# Patient Record
Sex: MALE | Race: WHITE
[De-identification: names, ages, dates, MRNs, and addresses within clinical notes are randomized per-mention and may not be internally consistent; named-entity substitution may affect disease eponyms.]

---

## 2020-09-02 ENCOUNTER — HOSPITAL ENCOUNTER (INPATIENT)
Dept: HOSPITAL 94 - ER | Age: 60
LOS: 19 days | Discharge: HOME | DRG: 230 | End: 2020-09-21
Attending: FAMILY MEDICINE | Admitting: FAMILY MEDICINE
Payer: MEDICAID

## 2020-09-02 VITALS — SYSTOLIC BLOOD PRESSURE: 143 MMHG | DIASTOLIC BLOOD PRESSURE: 75 MMHG

## 2020-09-02 VITALS — DIASTOLIC BLOOD PRESSURE: 62 MMHG | SYSTOLIC BLOOD PRESSURE: 97 MMHG

## 2020-09-02 VITALS — WEIGHT: 146.61 LBS | HEIGHT: 67 IN | BODY MASS INDEX: 23.01 KG/M2

## 2020-09-02 DIAGNOSIS — E87.5: ICD-10-CM

## 2020-09-02 DIAGNOSIS — E87.0: ICD-10-CM

## 2020-09-02 DIAGNOSIS — Z79.82: ICD-10-CM

## 2020-09-02 DIAGNOSIS — J44.9: ICD-10-CM

## 2020-09-02 DIAGNOSIS — Z95.5: ICD-10-CM

## 2020-09-02 DIAGNOSIS — Z80.8: ICD-10-CM

## 2020-09-02 DIAGNOSIS — K56.609: ICD-10-CM

## 2020-09-02 DIAGNOSIS — E87.2: ICD-10-CM

## 2020-09-02 DIAGNOSIS — K91.840: ICD-10-CM

## 2020-09-02 DIAGNOSIS — I25.2: ICD-10-CM

## 2020-09-02 DIAGNOSIS — K56.7: ICD-10-CM
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
## 2020-09-02 DIAGNOSIS — R57.8: ICD-10-CM

## 2020-09-02 DIAGNOSIS — N17.9: ICD-10-CM

## 2020-09-02 DIAGNOSIS — Z79.02: ICD-10-CM

## 2020-09-02 DIAGNOSIS — I82.623: ICD-10-CM

## 2020-09-02 DIAGNOSIS — I50.22: ICD-10-CM

## 2020-09-02 DIAGNOSIS — D63.8: ICD-10-CM

## 2020-09-02 DIAGNOSIS — E43: ICD-10-CM

## 2020-09-02 DIAGNOSIS — I13.0: ICD-10-CM

## 2020-09-02 DIAGNOSIS — J96.00: ICD-10-CM

## 2020-09-02 DIAGNOSIS — N18.9: ICD-10-CM

## 2020-09-02 DIAGNOSIS — K94.02: Primary | ICD-10-CM

## 2020-09-02 DIAGNOSIS — Z82.49: ICD-10-CM

## 2020-09-02 DIAGNOSIS — B37.9: ICD-10-CM

## 2020-09-02 DIAGNOSIS — E78.5: ICD-10-CM

## 2020-09-02 DIAGNOSIS — E83.52: ICD-10-CM

## 2020-09-02 DIAGNOSIS — Z79.899: ICD-10-CM

## 2020-09-02 DIAGNOSIS — E87.6: ICD-10-CM

## 2020-09-02 DIAGNOSIS — I25.10: ICD-10-CM
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
## 2020-09-02 DIAGNOSIS — E87.1: ICD-10-CM

## 2020-09-02 LAB
ALBUMIN SERPL BCP-MCNC: 4.2 G/DL (ref 3.4–5)
ALBUMIN/GLOB SERPL: 1 {RATIO} (ref 1.1–1.5)
ALP SERPL-CCNC: 121 IU/L (ref 46–116)
ALT SERPL W P-5'-P-CCNC: 40 U/L (ref 12–78)
ANION GAP SERPL CALCULATED.3IONS-SCNC: 9 MMOL/L (ref 8–16)
AST SERPL W P-5'-P-CCNC: 30 U/L (ref 10–37)
BASOPHILS # BLD AUTO: 0.1 X10'3 (ref 0–0.2)
BASOPHILS NFR BLD AUTO: 0.8 % (ref 0–1)
BILIRUB SERPL-MCNC: 0.3 MG/DL (ref 0.1–1)
BUN SERPL-MCNC: 60 MG/DL (ref 7–18)
BUN/CREAT SERPL: 30 (ref 5.4–32)
CALCIUM SERPL-MCNC: 10.8 MG/DL (ref 8.5–10.1)
CHLORIDE SERPL-SCNC: 91 MMOL/L (ref 99–107)
CO2 SERPL-SCNC: 23.1 MMOL/L (ref 24–32)
CREAT SERPL-MCNC: 2 MG/DL (ref 0.6–1.1)
EOSINOPHIL # BLD AUTO: 0.1 X10'3 (ref 0–0.9)
EOSINOPHIL NFR BLD AUTO: 1.5 % (ref 0–6)
ERYTHROCYTE [DISTWIDTH] IN BLOOD BY AUTOMATED COUNT: 17.1 % (ref 11.5–14.5)
GFR SERPL CREATININE-BSD FRML MDRD: 35 ML/MIN
GLUCOSE SERPL-MCNC: 113 MG/DL (ref 70–104)
HBA1C MFR BLD: 6.1 % (ref 4.5–6.2)
HCT VFR BLD AUTO: 33 % (ref 42–52)
HGB BLD-MCNC: 10.9 G/DL (ref 14–17.9)
LYMPHOCYTES # BLD AUTO: 2.1 X10'3 (ref 1.1–4.8)
LYMPHOCYTES NFR BLD AUTO: 22.4 % (ref 21–51)
MCH RBC QN AUTO: 29.2 PG (ref 27–31)
MCHC RBC AUTO-ENTMCNC: 33.1 G/DL (ref 33–36.5)
MCV RBC AUTO: 88.3 FL (ref 78–98)
MONOCYTES # BLD AUTO: 0.8 X10'3 (ref 0–0.9)
MONOCYTES NFR BLD AUTO: 8.5 % (ref 2–12)
NEUTROPHILS # BLD AUTO: 6.3 X10'3 (ref 1.8–7.7)
NEUTROPHILS NFR BLD AUTO: 66.8 % (ref 42–75)
PLATELET # BLD AUTO: 512 X10'3 (ref 140–440)
PMV BLD AUTO: 7 FL (ref 7.4–10.4)
POTASSIUM SERPL-SCNC: 5.2 MMOL/L (ref 3.5–5.1)
PROT SERPL-MCNC: 8.4 G/DL (ref 6.4–8.2)
RBC # BLD AUTO: 3.74 X10'6 (ref 4.7–6.1)
SODIUM SERPL-SCNC: 123 MMOL/L (ref 135–145)
WBC # BLD AUTO: 9.4 X10'3 (ref 4.5–11)

## 2020-09-02 PROCEDURE — 97110 THERAPEUTIC EXERCISES: CPT

## 2020-09-02 PROCEDURE — 87081 CULTURE SCREEN ONLY: CPT

## 2020-09-02 PROCEDURE — 94667 MNPJ CHEST WALL 1ST: CPT

## 2020-09-02 PROCEDURE — 97161 PT EVAL LOW COMPLEX 20 MIN: CPT

## 2020-09-02 PROCEDURE — 86900 BLOOD TYPING SEROLOGIC ABO: CPT

## 2020-09-02 PROCEDURE — 83036 HEMOGLOBIN GLYCOSYLATED A1C: CPT

## 2020-09-02 PROCEDURE — 97164 PT RE-EVAL EST PLAN CARE: CPT

## 2020-09-02 PROCEDURE — 36600 WITHDRAWAL OF ARTERIAL BLOOD: CPT

## 2020-09-02 PROCEDURE — 97116 GAIT TRAINING THERAPY: CPT

## 2020-09-02 PROCEDURE — 80061 LIPID PANEL: CPT

## 2020-09-02 PROCEDURE — 93971 EXTREMITY STUDY: CPT

## 2020-09-02 PROCEDURE — 86901 BLOOD TYPING SEROLOGIC RH(D): CPT

## 2020-09-02 PROCEDURE — 84100 ASSAY OF PHOSPHORUS: CPT

## 2020-09-02 PROCEDURE — 74270 X-RAY XM COLON 1CNTRST STD: CPT

## 2020-09-02 PROCEDURE — 86920 COMPATIBILITY TEST SPIN: CPT

## 2020-09-02 PROCEDURE — 36573 INSJ PICC RS&I 5 YR+: CPT

## 2020-09-02 PROCEDURE — 82948 REAGENT STRIP/BLOOD GLUCOSE: CPT

## 2020-09-02 PROCEDURE — 84478 ASSAY OF TRIGLYCERIDES: CPT

## 2020-09-02 PROCEDURE — 97530 THERAPEUTIC ACTIVITIES: CPT

## 2020-09-02 PROCEDURE — 85008 BL SMEAR W/O DIFF WBC COUNT: CPT

## 2020-09-02 PROCEDURE — 85018 HEMOGLOBIN: CPT

## 2020-09-02 PROCEDURE — 85027 COMPLETE CBC AUTOMATED: CPT

## 2020-09-02 PROCEDURE — 94002 VENT MGMT INPAT INIT DAY: CPT

## 2020-09-02 PROCEDURE — 86885 COOMBS TEST INDIRECT QUAL: CPT

## 2020-09-02 PROCEDURE — 82803 BLOOD GASES ANY COMBINATION: CPT

## 2020-09-02 PROCEDURE — 94003 VENT MGMT INPAT SUBQ DAY: CPT

## 2020-09-02 PROCEDURE — 76937 US GUIDE VASCULAR ACCESS: CPT

## 2020-09-02 PROCEDURE — 87040 BLOOD CULTURE FOR BACTERIA: CPT

## 2020-09-02 PROCEDURE — 94640 AIRWAY INHALATION TREATMENT: CPT

## 2020-09-02 PROCEDURE — 74248 X-RAY SM INT F-THRU STD: CPT

## 2020-09-02 PROCEDURE — 83735 ASSAY OF MAGNESIUM: CPT

## 2020-09-02 PROCEDURE — 80202 ASSAY OF VANCOMYCIN: CPT

## 2020-09-02 PROCEDURE — 84132 ASSAY OF SERUM POTASSIUM: CPT

## 2020-09-02 PROCEDURE — 85025 COMPLETE CBC W/AUTO DIFF WBC: CPT

## 2020-09-02 PROCEDURE — 99285 EMERGENCY DEPT VISIT HI MDM: CPT

## 2020-09-02 PROCEDURE — 36415 COLL VENOUS BLD VENIPUNCTURE: CPT

## 2020-09-02 PROCEDURE — 85610 PROTHROMBIN TIME: CPT

## 2020-09-02 PROCEDURE — 74176 CT ABD & PELVIS W/O CONTRAST: CPT

## 2020-09-02 PROCEDURE — 83605 ASSAY OF LACTIC ACID: CPT

## 2020-09-02 PROCEDURE — 84134 ASSAY OF PREALBUMIN: CPT

## 2020-09-02 PROCEDURE — 36430 TRANSFUSION BLD/BLD COMPNT: CPT

## 2020-09-02 PROCEDURE — 94760 N-INVAS EAR/PLS OXIMETRY 1: CPT

## 2020-09-02 PROCEDURE — 71045 X-RAY EXAM CHEST 1 VIEW: CPT

## 2020-09-02 PROCEDURE — 85730 THROMBOPLASTIN TIME PARTIAL: CPT

## 2020-09-02 PROCEDURE — 87070 CULTURE OTHR SPECIMN AEROBIC: CPT

## 2020-09-02 PROCEDURE — 74018 RADEX ABDOMEN 1 VIEW: CPT

## 2020-09-02 PROCEDURE — 97535 SELF CARE MNGMENT TRAINING: CPT

## 2020-09-02 PROCEDURE — 94668 MNPJ CHEST WALL SBSQ: CPT

## 2020-09-02 PROCEDURE — 80053 COMPREHEN METABOLIC PANEL: CPT

## 2020-09-02 PROCEDURE — 93005 ELECTROCARDIOGRAM TRACING: CPT

## 2020-09-02 RX ADMIN — SODIUM CHLORIDE SCH MLS/HR: 9 INJECTION INTRAMUSCULAR; INTRAVENOUS; SUBCUTANEOUS at 11:47

## 2020-09-02 RX ADMIN — TAZOBACTAM SODIUM AND PIPERACILLIN SODIUM SCH MLS/HR: 375; 3 INJECTION, SOLUTION INTRAVENOUS at 16:23

## 2020-09-02 RX ADMIN — NYSTATIN AND TRIAMCINOLONE ACETONIDE SCH APPLIC: 100000; 1 CREAM TOPICAL at 21:00

## 2020-09-02 RX ADMIN — NYSTATIN AND TRIAMCINOLONE ACETONIDE SCH APPLIC: 100000; 1 CREAM TOPICAL at 13:00

## 2020-09-02 RX ADMIN — TAZOBACTAM SODIUM AND PIPERACILLIN SODIUM SCH MLS/HR: 375; 3 INJECTION, SOLUTION INTRAVENOUS at 11:47

## 2020-09-02 RX ADMIN — VANCOMYCIN HYDROCHLORIDE SCH MLS/HR: 750 INJECTION, POWDER, LYOPHILIZED, FOR SOLUTION INTRAVENOUS at 16:23

## 2020-09-02 RX ADMIN — HEPARIN SODIUM SCH UNIT: 5000 INJECTION, SOLUTION INTRAVENOUS; SUBCUTANEOUS at 20:38

## 2020-09-02 RX ADMIN — NYSTATIN AND TRIAMCINOLONE ACETONIDE SCH APPLIC: 100000; 1 CREAM TOPICAL at 10:10

## 2020-09-02 RX ADMIN — SODIUM CHLORIDE SCH MLS/HR: 9 INJECTION INTRAMUSCULAR; INTRAVENOUS; SUBCUTANEOUS at 20:47

## 2020-09-02 NOTE — NUR
tried to call report to surgical, rn stated that room wasnt cleaned and they 
would call back, they said they could not take report until room was cleaned

## 2020-09-02 NOTE — NUR
PAGER ID:  [phone] 

MESSAGE:  Surgical Flr Ann RN ext [phone]. RE: Emmanuel Selby. Patient requesting Dilaudid 
IV for pain as Morphine don't work well for him. Wound care nurse at bedside asking if we 
can get Lidocaine topical for wound care

## 2020-09-02 NOTE — NUR
Patient in room HOOD 347. I have received report from  SUKI MUJICA  and had the opportunity to 
ask questions and assume patient care.

## 2020-09-03 VITALS — SYSTOLIC BLOOD PRESSURE: 105 MMHG | DIASTOLIC BLOOD PRESSURE: 65 MMHG

## 2020-09-03 VITALS — SYSTOLIC BLOOD PRESSURE: 111 MMHG | DIASTOLIC BLOOD PRESSURE: 72 MMHG

## 2020-09-03 VITALS — SYSTOLIC BLOOD PRESSURE: 118 MMHG | DIASTOLIC BLOOD PRESSURE: 65 MMHG

## 2020-09-03 VITALS — SYSTOLIC BLOOD PRESSURE: 110 MMHG | DIASTOLIC BLOOD PRESSURE: 74 MMHG

## 2020-09-03 VITALS — DIASTOLIC BLOOD PRESSURE: 62 MMHG | SYSTOLIC BLOOD PRESSURE: 105 MMHG

## 2020-09-03 LAB
ALBUMIN SERPL BCP-MCNC: 3.4 G/DL (ref 3.4–5)
ALBUMIN/GLOB SERPL: 0.9 {RATIO} (ref 1.1–1.5)
ALP SERPL-CCNC: 109 IU/L (ref 46–116)
ALT SERPL W P-5'-P-CCNC: 42 U/L (ref 12–78)
ANION GAP SERPL CALCULATED.3IONS-SCNC: 11 MMOL/L (ref 8–16)
ANISOCYTOSIS BLD QL SMEAR: (no result)
AST SERPL W P-5'-P-CCNC: 33 U/L (ref 10–37)
BASOPHILS # BLD AUTO: 0.1 X10'3 (ref 0–0.2)
BASOPHILS NFR BLD AUTO: 1 % (ref 0–1)
BILIRUB SERPL-MCNC: 0.4 MG/DL (ref 0.1–1)
BUN SERPL-MCNC: 46 MG/DL (ref 7–18)
BUN/CREAT SERPL: 28 (ref 5.4–32)
BURR CELLS BLD QL SMEAR: (no result)
CALCIUM SERPL-MCNC: 9.9 MG/DL (ref 8.5–10.1)
CHLORIDE SERPL-SCNC: 97 MMOL/L (ref 99–107)
CHOLEST SERPL-MCNC: 88 MG/DL (ref 0–200)
CHOLEST/HDLC SERPL: 2.5 {RATIO} (ref 0–4.99)
CO2 SERPL-SCNC: 21.1 MMOL/L (ref 24–32)
CREAT SERPL-MCNC: 1.64 MG/DL (ref 0.6–1.1)
EOSINOPHIL # BLD AUTO: 0.3 X10'3 (ref 0–0.9)
EOSINOPHIL NFR BLD AUTO: 4.4 % (ref 0–6)
ERYTHROCYTE [DISTWIDTH] IN BLOOD BY AUTOMATED COUNT: 17.7 % (ref 11.5–14.5)
GFR SERPL CREATININE-BSD FRML MDRD: 44 ML/MIN
GLUCOSE SERPL-MCNC: 92 MG/DL (ref 70–104)
HCT VFR BLD AUTO: 32.5 % (ref 42–52)
HDLC SERPL-MCNC: 35 MG/DL (ref 35–60)
HGB BLD-MCNC: 10.8 G/DL (ref 14–17.9)
LDLC SERPL DIRECT ASSAY-MCNC: 37 MG/DL (ref 50–100)
LYMPHOCYTES # BLD AUTO: 2.6 X10'3 (ref 1.1–4.8)
LYMPHOCYTES NFR BLD AUTO: 33.8 % (ref 21–51)
MAGNESIUM SERPL-MCNC: 1.6 MG/DL (ref 1.5–2.4)
MCH RBC QN AUTO: 30.2 PG (ref 27–31)
MCHC RBC AUTO-ENTMCNC: 33.4 G/DL (ref 33–36.5)
MCV RBC AUTO: 90.2 FL (ref 78–98)
MONOCYTES # BLD AUTO: 0.9 X10'3 (ref 0–0.9)
MONOCYTES NFR BLD AUTO: 11.4 % (ref 2–12)
NEUTROPHILS # BLD AUTO: 3.7 X10'3 (ref 1.8–7.7)
NEUTROPHILS NFR BLD AUTO: 49.4 % (ref 42–75)
OVALOCYTES BLD QL SMEAR: (no result)
PHOSPHATE SERPL-MCNC: 4.4 MG/DL (ref 2.3–4.5)
PLATELET # BLD AUTO: 420 X10'3 (ref 140–440)
PLATELET BLD QL SMEAR: NORMAL
PMV BLD AUTO: 7.1 FL (ref 7.4–10.4)
POIKILOCYTOSIS BLD QL SMEAR: (no result)
POTASSIUM SERPL-SCNC: 5 MMOL/L (ref 3.5–5.1)
PROT SERPL-MCNC: 7.2 G/DL (ref 6.4–8.2)
RBC # BLD AUTO: 3.6 X10'6 (ref 4.7–6.1)
RBC MORPH BLD: (no result)
SCHISTOCYTES BLD QL SMEAR: (no result)
SODIUM SERPL-SCNC: 129 MMOL/L (ref 135–145)
TRIGL SERPL-MCNC: 129 MG/DL (ref 20–135)
WBC # BLD AUTO: 7.6 X10'3 (ref 4.5–11)

## 2020-09-03 RX ADMIN — Medication SCH MMU: at 20:16

## 2020-09-03 RX ADMIN — SODIUM CHLORIDE SCH MLS/HR: 9 INJECTION INTRAMUSCULAR; INTRAVENOUS; SUBCUTANEOUS at 04:01

## 2020-09-03 RX ADMIN — Medication SCH CAN: at 18:00

## 2020-09-03 RX ADMIN — VANCOMYCIN HYDROCHLORIDE SCH MLS/HR: 750 INJECTION, POWDER, LYOPHILIZED, FOR SOLUTION INTRAVENOUS at 12:51

## 2020-09-03 RX ADMIN — CARVEDILOL SCH MG: 6.25 TABLET, FILM COATED ORAL at 20:00

## 2020-09-03 RX ADMIN — MIDODRINE HYDROCHLORIDE SCH MG: 5 TABLET ORAL at 16:43

## 2020-09-03 RX ADMIN — SODIUM CHLORIDE SCH MLS/HR: 9 INJECTION INTRAMUSCULAR; INTRAVENOUS; SUBCUTANEOUS at 16:38

## 2020-09-03 RX ADMIN — HEPARIN SODIUM SCH UNIT: 5000 INJECTION, SOLUTION INTRAVENOUS; SUBCUTANEOUS at 08:54

## 2020-09-03 RX ADMIN — TAZOBACTAM SODIUM AND PIPERACILLIN SODIUM SCH MLS/HR: 375; 3 INJECTION, SOLUTION INTRAVENOUS at 07:59

## 2020-09-03 RX ADMIN — TAZOBACTAM SODIUM AND PIPERACILLIN SODIUM SCH MLS/HR: 375; 3 INJECTION, SOLUTION INTRAVENOUS at 16:38

## 2020-09-03 RX ADMIN — TAZOBACTAM SODIUM AND PIPERACILLIN SODIUM SCH MLS/HR: 375; 3 INJECTION, SOLUTION INTRAVENOUS at 00:36

## 2020-09-03 RX ADMIN — NYSTATIN AND TRIAMCINOLONE ACETONIDE SCH APPLIC: 100000; 1 CREAM TOPICAL at 12:27

## 2020-09-03 RX ADMIN — NYSTATIN AND TRIAMCINOLONE ACETONIDE SCH APPLIC: 100000; 1 CREAM TOPICAL at 21:00

## 2020-09-03 RX ADMIN — HEPARIN SODIUM SCH UNIT: 5000 INJECTION, SOLUTION INTRAVENOUS; SUBCUTANEOUS at 20:13

## 2020-09-03 RX ADMIN — NYSTATIN AND TRIAMCINOLONE ACETONIDE SCH APPLIC: 100000; 1 CREAM TOPICAL at 08:00

## 2020-09-03 NOTE — NUR
I called Dr. Henry to ask if patient can be fed today. Dr. Henry ordered clear liquid 
diet and Gastrograffin enema to be done at Radiology department. Radiology staff notified 
about this, the xray tech and radiologist not be able to do it right away. Radiology staff 
also advised me to go ahead give patient clear liquid diet as the procedure will be delayed 
for a while until the tech and radiologist are able to do it. I told patient that the 
Gastrograffin will not get done immediately as we have to wait for the Xray tech and 
radiology doctor to be available to do it, I offered clear liquid diet for breakfast but 
patient declined it. Patient states "I would rather wait for the test to get done. Patient 
expressed frustration over being NPO after midnight and now only allowed to have clear 
liquid diet instead of solid food.

## 2020-09-03 NOTE — NUR
Problems reprioritized. Patient report given, questions answered & plan of care reviewed 
with SUKI MUJICA.

## 2020-09-03 NOTE — NUR
Patient in room HOOD 347. I have received report from   Vannessa MUJICA  and had the opportunity to 
ask questions and assume patient care.

## 2020-09-03 NOTE — NUR
Malnutrition consult: Pt seen at bedside reports  lbs with wt loss occurring once 
ostomy was placed in the beginning of June of this year d/t decreased appetite resulting in 
poor PO intake secondary to increased pain. Pt states he didn't have sufficient pain control 
post-op as his pain medications weren't being absorbed. Current documented scaled weight is 
113 lbs. This is severe wt loss of 13% in three months. Pt with mild visible bilat temporal 
wasting. Pt currently meets criteria for malnutrition. Pt provided with malnutrition 
education with ONS coupons. Pt states PTA he would consume yogurt, Ensure, and Boost. Pt 
admit for infection surrounding ostomy, pending possible reversal. Pt inquired about diet 
following possible reversal. RD provided verbal low fiber nutrition therapy education with a 
written list of fiber content in food. All of patient's questions were answered at this 
time. Pt requesting Ensure with meals, d/w RN. Pt expressed anger about meal trays received 
and having a heart healthy diet order. RD validated patient's concerns and informed pt that 
diet order is per physician. Patient's concerns were also d/w patient's RN. Pt provided with 
RD contact information. Will remain available.

Recommendations:

1) Continue regular diet

2) Diet change to low fiber post-op if to get ostomy reversal

3) Strawberry Ensure Enlive TID, dislikes chocolate

4) Honor food preferences: dislikes cream of wheat

5) Bowel care PRN

6) Scaled weights per rx

-------------------------------------------------------------------------------

Addendum: 09/03/20 at 1647 by Mecca Pavon RD

-------------------------------------------------------------------------------

Amended: Links added.

## 2020-09-03 NOTE — NUR
I have received report from GUANAKO Juarez  and had the opportunity to ask questions and assume 
patient care.

## 2020-09-03 NOTE — NUR
Problems reprioritized. Patient report given, questions answered & plan of care reviewed 
with Vick MUJICA.

## 2020-09-04 VITALS — DIASTOLIC BLOOD PRESSURE: 76 MMHG | SYSTOLIC BLOOD PRESSURE: 135 MMHG

## 2020-09-04 VITALS — DIASTOLIC BLOOD PRESSURE: 62 MMHG | SYSTOLIC BLOOD PRESSURE: 111 MMHG

## 2020-09-04 VITALS — SYSTOLIC BLOOD PRESSURE: 134 MMHG | DIASTOLIC BLOOD PRESSURE: 78 MMHG

## 2020-09-04 VITALS — DIASTOLIC BLOOD PRESSURE: 80 MMHG | SYSTOLIC BLOOD PRESSURE: 135 MMHG

## 2020-09-04 VITALS — DIASTOLIC BLOOD PRESSURE: 78 MMHG | SYSTOLIC BLOOD PRESSURE: 151 MMHG

## 2020-09-04 VITALS — SYSTOLIC BLOOD PRESSURE: 146 MMHG | DIASTOLIC BLOOD PRESSURE: 77 MMHG

## 2020-09-04 VITALS — SYSTOLIC BLOOD PRESSURE: 102 MMHG | DIASTOLIC BLOOD PRESSURE: 57 MMHG

## 2020-09-04 VITALS — SYSTOLIC BLOOD PRESSURE: 80 MMHG | DIASTOLIC BLOOD PRESSURE: 52 MMHG

## 2020-09-04 VITALS — DIASTOLIC BLOOD PRESSURE: 50 MMHG | SYSTOLIC BLOOD PRESSURE: 96 MMHG

## 2020-09-04 VITALS — DIASTOLIC BLOOD PRESSURE: 69 MMHG | SYSTOLIC BLOOD PRESSURE: 101 MMHG

## 2020-09-04 VITALS — DIASTOLIC BLOOD PRESSURE: 69 MMHG | SYSTOLIC BLOOD PRESSURE: 127 MMHG

## 2020-09-04 VITALS — DIASTOLIC BLOOD PRESSURE: 84 MMHG | SYSTOLIC BLOOD PRESSURE: 185 MMHG

## 2020-09-04 VITALS — SYSTOLIC BLOOD PRESSURE: 101 MMHG | DIASTOLIC BLOOD PRESSURE: 65 MMHG

## 2020-09-04 VITALS — DIASTOLIC BLOOD PRESSURE: 55 MMHG | SYSTOLIC BLOOD PRESSURE: 93 MMHG

## 2020-09-04 VITALS — SYSTOLIC BLOOD PRESSURE: 148 MMHG | DIASTOLIC BLOOD PRESSURE: 80 MMHG

## 2020-09-04 LAB
ALBUMIN SERPL BCP-MCNC: 2.3 G/DL (ref 3.4–5)
ALBUMIN SERPL BCP-MCNC: 3 G/DL (ref 3.4–5)
ALBUMIN/GLOB SERPL: 0.9 {RATIO} (ref 1.1–1.5)
ALBUMIN/GLOB SERPL: 1 {RATIO} (ref 1.1–1.5)
ALP SERPL-CCNC: 101 IU/L (ref 46–116)
ALP SERPL-CCNC: 58 IU/L (ref 46–116)
ALT SERPL W P-5'-P-CCNC: 26 U/L (ref 12–78)
ALT SERPL W P-5'-P-CCNC: 35 U/L (ref 12–78)
ANION GAP SERPL CALCULATED.3IONS-SCNC: 10 MMOL/L (ref 8–16)
ANION GAP SERPL CALCULATED.3IONS-SCNC: 9 MMOL/L (ref 8–16)
APTT PPP: 29 SECONDS (ref 22–32)
ARTERIAL PATENCY WRIST A: POSITIVE
AST SERPL W P-5'-P-CCNC: 26 U/L (ref 10–37)
AST SERPL W P-5'-P-CCNC: 31 U/L (ref 10–37)
BASE EXCESS BLDA CALC-SCNC: -10.2 MMOL/L (ref -2–2)
BASE EXCESS BLDA CALC-SCNC: -9.7 MMOL/L (ref -2–2)
BASOPHILS # BLD AUTO: 0 X10'3 (ref 0–0.2)
BASOPHILS # BLD AUTO: 0.1 X10'3 (ref 0–0.2)
BASOPHILS NFR BLD AUTO: 0.1 % (ref 0–1)
BASOPHILS NFR BLD AUTO: 1 % (ref 0–1)
BILIRUB SERPL-MCNC: 0.2 MG/DL (ref 0.1–1)
BILIRUB SERPL-MCNC: 0.5 MG/DL (ref 0.1–1)
BODY TEMPERATURE: 34.6
BODY TEMPERATURE: 37
BUN SERPL-MCNC: 19 MG/DL (ref 7–18)
BUN SERPL-MCNC: 27 MG/DL (ref 7–18)
BUN/CREAT SERPL: 16.2 (ref 5.4–32)
BUN/CREAT SERPL: 16.5 (ref 5.4–32)
CALCIUM SERPL-MCNC: 7.5 MG/DL (ref 8.5–10.1)
CALCIUM SERPL-MCNC: 9.3 MG/DL (ref 8.5–10.1)
CHLORIDE SERPL-SCNC: 104 MMOL/L (ref 99–107)
CHLORIDE SERPL-SCNC: 110 MMOL/L (ref 99–107)
CO2 SERPL-SCNC: 18.3 MMOL/L (ref 24–32)
CO2 SERPL-SCNC: 20.4 MMOL/L (ref 24–32)
COHGB MFR BLDA: 0.3 % (ref 0–3.9)
COHGB MFR BLDA: 0.3 % (ref 0–3.9)
CREAT SERPL-MCNC: 1.17 MG/DL (ref 0.6–1.1)
CREAT SERPL-MCNC: 1.64 MG/DL (ref 0.6–1.1)
EOSINOPHIL # BLD AUTO: 0 X10'3 (ref 0–0.9)
EOSINOPHIL # BLD AUTO: 0.3 X10'3 (ref 0–0.9)
EOSINOPHIL NFR BLD AUTO: 0.1 % (ref 0–6)
EOSINOPHIL NFR BLD AUTO: 4.8 % (ref 0–6)
ERYTHROCYTE [DISTWIDTH] IN BLOOD BY AUTOMATED COUNT: 16.3 % (ref 11.5–14.5)
ERYTHROCYTE [DISTWIDTH] IN BLOOD BY AUTOMATED COUNT: 16.8 % (ref 11.5–14.5)
ERYTHROCYTE [DISTWIDTH] IN BLOOD BY AUTOMATED COUNT: 17 % (ref 11.5–14.5)
GFR SERPL CREATININE-BSD FRML MDRD: 44 ML/MIN
GFR SERPL CREATININE-BSD FRML MDRD: 64 ML/MIN
GLUCOSE SERPL-MCNC: 121 MG/DL (ref 70–104)
GLUCOSE SERPL-MCNC: 198 MG/DL (ref 70–104)
HCO3 BLDA-SCNC: 15.9 MMOL/L (ref 22–26)
HCO3 BLDA-SCNC: 16.1 MMOL/L (ref 22–26)
HCT VFR BLD AUTO: 19.6 % (ref 42–52)
HCT VFR BLD AUTO: 28.5 % (ref 42–52)
HCT VFR BLD AUTO: 30.9 % (ref 42–52)
HGB BLD-MCNC: 10.3 G/DL (ref 14–17.9)
HGB BLD-MCNC: 6.2 G/DL (ref 14–17.9)
HGB BLD-MCNC: 9.5 G/DL (ref 14–17.9)
HGB BLDA-MCNC: 11.4 G/DL (ref 14–18)
HGB BLDA-MCNC: 6.1 G/DL (ref 14–18)
INHALED O2 FLOW RATE: 4 L/MIN
LYMPHOCYTES # BLD AUTO: 0.8 X10'3 (ref 1.1–4.8)
LYMPHOCYTES # BLD AUTO: 2.7 X10'3 (ref 1.1–4.8)
LYMPHOCYTES NFR BLD AUTO: 38.6 % (ref 21–51)
LYMPHOCYTES NFR BLD AUTO: 5.5 % (ref 21–51)
MAGNESIUM SERPL-MCNC: 0.9 MG/DL (ref 1.5–2.4)
MAGNESIUM SERPL-MCNC: 1.4 MG/DL (ref 1.5–2.4)
MCH RBC QN AUTO: 29.1 PG (ref 27–31)
MCH RBC QN AUTO: 30.1 PG (ref 27–31)
MCH RBC QN AUTO: 30.7 PG (ref 27–31)
MCHC RBC AUTO-ENTMCNC: 31.9 G/DL (ref 33–36.5)
MCHC RBC AUTO-ENTMCNC: 33.2 G/DL (ref 33–36.5)
MCHC RBC AUTO-ENTMCNC: 33.2 G/DL (ref 33–36.5)
MCV RBC AUTO: 90.6 FL (ref 78–98)
MCV RBC AUTO: 91.2 FL (ref 78–98)
MCV RBC AUTO: 92.4 FL (ref 78–98)
METHGB MFR BLDA: 0.1 % (ref 0–1.5)
METHGB MFR BLDA: 0.2 % (ref 0–1.5)
MONOCYTES # BLD AUTO: 0.9 X10'3 (ref 0–0.9)
MONOCYTES # BLD AUTO: 1.2 X10'3 (ref 0–0.9)
MONOCYTES NFR BLD AUTO: 13.2 % (ref 2–12)
MONOCYTES NFR BLD AUTO: 8.7 % (ref 2–12)
NEUTROPHILS # BLD AUTO: 11.9 X10'3 (ref 1.8–7.7)
NEUTROPHILS # BLD AUTO: 2.9 X10'3 (ref 1.8–7.7)
NEUTROPHILS NFR BLD AUTO: 42.4 % (ref 42–75)
NEUTROPHILS NFR BLD AUTO: 85.6 % (ref 42–75)
O2/TOTAL GAS SETTING VFR VENT: 100 MMHG/%
O2/TOTAL GAS SETTING VFR VENT: 36 MMHG/%
OXYHGB MFR BLDA: 98.6 % (ref 94–97)
OXYHGB MFR BLDA: 98.8 % (ref 94–97)
PCO2 TEMP ADJ BLDA: 32.2 MMHG (ref 35–48)
PCO2 TEMP ADJ BLDA: 34.7 MMHG (ref 35–48)
PEEP RESPIRATORY: 5 CM H2O
PHOSPHATE SERPL-MCNC: 3.3 MG/DL (ref 2.3–4.5)
PHOSPHATE SERPL-MCNC: 3.4 MG/DL (ref 2.3–4.5)
PLATELET # BLD AUTO: 238 X10'3 (ref 140–440)
PLATELET # BLD AUTO: 350 X10'3 (ref 140–440)
PLATELET # BLD AUTO: 397 X10'3 (ref 140–440)
PMV BLD AUTO: 6.8 FL (ref 7.4–10.4)
PMV BLD AUTO: 6.9 FL (ref 7.4–10.4)
PMV BLD AUTO: 7.2 FL (ref 7.4–10.4)
PO2 TEMP ADJ BLD: 165.7 MMHG (ref 75–100)
PO2 TEMP ADJ BLD: 361.6 MMHG (ref 75–100)
POTASSIUM SERPL-SCNC: 4.1 MMOL/L (ref 3.5–5.1)
POTASSIUM SERPL-SCNC: 4.4 MMOL/L (ref 3.5–5.1)
PROT SERPL-MCNC: 4.6 G/DL (ref 6.4–8.2)
PROT SERPL-MCNC: 6.5 G/DL (ref 6.4–8.2)
RBC # BLD AUTO: 2.14 X10'6 (ref 4.7–6.1)
RBC # BLD AUTO: 3.15 X10'6 (ref 4.7–6.1)
RBC # BLD AUTO: 3.34 X10'6 (ref 4.7–6.1)
RESP RATE 1H: 12 B/MIN
SAO2 % BLDA: 99 % (ref 94–97)
SAO2 % BLDA: 99.3 % (ref 94–97)
SODIUM SERPL-SCNC: 134 MMOL/L (ref 135–145)
SODIUM SERPL-SCNC: 137 MMOL/L (ref 135–145)
SPONT VT COMPRES GAS VOL SET UNCOR VENT: 500 ML
WBC # BLD AUTO: 13.8 X10'3 (ref 4.5–11)
WBC # BLD AUTO: 17.7 X10'3 (ref 4.5–11)
WBC # BLD AUTO: 6.9 X10'3 (ref 4.5–11)

## 2020-09-04 PROCEDURE — 30233K1 TRANSFUSION OF NONAUTOLOGOUS FROZEN PLASMA INTO PERIPHERAL VEIN, PERCUTANEOUS APPROACH: ICD-10-PCS | Performed by: SURGERY

## 2020-09-04 PROCEDURE — 30233R1 TRANSFUSION OF NONAUTOLOGOUS PLATELETS INTO PERIPHERAL VEIN, PERCUTANEOUS APPROACH: ICD-10-PCS | Performed by: SURGERY

## 2020-09-04 PROCEDURE — 0DBB0ZZ EXCISION OF ILEUM, OPEN APPROACH: ICD-10-PCS | Performed by: SURGERY

## 2020-09-04 PROCEDURE — 0W3G0ZZ CONTROL BLEEDING IN PERITONEAL CAVITY, OPEN APPROACH: ICD-10-PCS | Performed by: SURGERY

## 2020-09-04 PROCEDURE — 30233N1 TRANSFUSION OF NONAUTOLOGOUS RED BLOOD CELLS INTO PERIPHERAL VEIN, PERCUTANEOUS APPROACH: ICD-10-PCS | Performed by: SURGERY

## 2020-09-04 RX ADMIN — TAZOBACTAM SODIUM AND PIPERACILLIN SODIUM SCH MLS/HR: 375; 3 INJECTION, SOLUTION INTRAVENOUS at 11:14

## 2020-09-04 RX ADMIN — HYDROMORPHONE HYDROCHLORIDE SCH MLS/HR: 10 INJECTION, SOLUTION INTRAMUSCULAR; INTRAVENOUS; SUBCUTANEOUS at 17:00

## 2020-09-04 RX ADMIN — HYDROMORPHONE HYDROCHLORIDE SCH MLS/HR: 10 INJECTION, SOLUTION INTRAMUSCULAR; INTRAVENOUS; SUBCUTANEOUS at 15:17

## 2020-09-04 RX ADMIN — Medication SCH MMU: at 09:40

## 2020-09-04 RX ADMIN — NYSTATIN AND TRIAMCINOLONE ACETONIDE SCH APPLIC: 100000; 1 CREAM TOPICAL at 09:51

## 2020-09-04 RX ADMIN — Medication SCH MG: at 08:00

## 2020-09-04 RX ADMIN — SODIUM CHLORIDE SCH MLS/HR: 9 INJECTION INTRAMUSCULAR; INTRAVENOUS; SUBCUTANEOUS at 19:33

## 2020-09-04 RX ADMIN — TAZOBACTAM SODIUM AND PIPERACILLIN SODIUM SCH MLS/HR: 375; 3 INJECTION, SOLUTION INTRAVENOUS at 00:19

## 2020-09-04 RX ADMIN — Medication SCH CAN: at 09:58

## 2020-09-04 RX ADMIN — CARVEDILOL SCH MG: 6.25 TABLET, FILM COATED ORAL at 21:41

## 2020-09-04 RX ADMIN — NYSTATIN AND TRIAMCINOLONE ACETONIDE SCH APPLIC: 100000; 1 CREAM TOPICAL at 21:00

## 2020-09-04 RX ADMIN — HYDROMORPHONE HYDROCHLORIDE SCH MLS/HR: 10 INJECTION, SOLUTION INTRAMUSCULAR; INTRAVENOUS; SUBCUTANEOUS at 19:00

## 2020-09-04 RX ADMIN — CARVEDILOL SCH MG: 6.25 TABLET, FILM COATED ORAL at 08:00

## 2020-09-04 RX ADMIN — Medication SCH TAB: at 09:40

## 2020-09-04 RX ADMIN — TAZOBACTAM SODIUM AND PIPERACILLIN SODIUM SCH MLS/HR: 375; 3 INJECTION, SOLUTION INTRAVENOUS at 16:39

## 2020-09-04 RX ADMIN — MIDODRINE HYDROCHLORIDE SCH MG: 5 TABLET ORAL at 00:16

## 2020-09-04 RX ADMIN — HEPARIN SODIUM SCH UNIT: 5000 INJECTION, SOLUTION INTRAVENOUS; SUBCUTANEOUS at 08:00

## 2020-09-04 RX ADMIN — HYDROMORPHONE HYDROCHLORIDE SCH MLS/HR: 10 INJECTION, SOLUTION INTRAMUSCULAR; INTRAVENOUS; SUBCUTANEOUS at 23:00

## 2020-09-04 RX ADMIN — SODIUM CHLORIDE, SODIUM LACTATE, POTASSIUM CHLORIDE, AND CALCIUM CHLORIDE SCH MLS/HR: .6; .31; .03; .02 INJECTION, SOLUTION INTRAVENOUS at 16:44

## 2020-09-04 RX ADMIN — MIDODRINE HYDROCHLORIDE SCH MG: 5 TABLET ORAL at 16:00

## 2020-09-04 RX ADMIN — SODIUM CHLORIDE, SODIUM LACTATE, POTASSIUM CHLORIDE, AND CALCIUM CHLORIDE SCH MLS/HR: .6; .31; .03; .02 INJECTION, SOLUTION INTRAVENOUS at 12:34

## 2020-09-04 RX ADMIN — Medication SCH MMU: at 21:41

## 2020-09-04 RX ADMIN — Medication SCH CAN: at 19:45

## 2020-09-04 RX ADMIN — MEPERIDINE HYDROCHLORIDE PRN MG: 25 INJECTION, SOLUTION INTRAMUSCULAR; INTRAVENOUS; SUBCUTANEOUS at 15:04

## 2020-09-04 RX ADMIN — SODIUM CHLORIDE SCH MLS/HR: 9 INJECTION INTRAMUSCULAR; INTRAVENOUS; SUBCUTANEOUS at 02:25

## 2020-09-04 RX ADMIN — NYSTATIN AND TRIAMCINOLONE ACETONIDE SCH APPLIC: 100000; 1 CREAM TOPICAL at 13:00

## 2020-09-04 RX ADMIN — MEPERIDINE HYDROCHLORIDE PRN MG: 25 INJECTION, SOLUTION INTRAMUSCULAR; INTRAVENOUS; SUBCUTANEOUS at 14:45

## 2020-09-04 RX ADMIN — Medication SCH CAN: at 08:21

## 2020-09-04 RX ADMIN — MIDODRINE HYDROCHLORIDE SCH MG: 5 TABLET ORAL at 09:41

## 2020-09-04 RX ADMIN — Medication PRN MLS/HR: at 19:36

## 2020-09-04 RX ADMIN — HYDROMORPHONE HYDROCHLORIDE SCH MLS/HR: 10 INJECTION, SOLUTION INTRAMUSCULAR; INTRAVENOUS; SUBCUTANEOUS at 21:00

## 2020-09-04 RX ADMIN — HEPARIN SODIUM SCH UNIT: 5000 INJECTION, SOLUTION INTRAVENOUS; SUBCUTANEOUS at 21:42

## 2020-09-04 RX ADMIN — VANCOMYCIN HYDROCHLORIDE SCH MLS/HR: 750 INJECTION, POWDER, LYOPHILIZED, FOR SOLUTION INTRAVENOUS at 12:00

## 2020-09-04 RX ADMIN — MIDAZOLAM HYDROCHLORIDE SCH MLS/HR: 5 INJECTION, SOLUTION INTRAMUSCULAR; INTRAVENOUS at 17:36

## 2020-09-04 RX ADMIN — SODIUM CHLORIDE SCH MLS/HR: 9 INJECTION INTRAMUSCULAR; INTRAVENOUS; SUBCUTANEOUS at 12:30

## 2020-09-04 NOTE — NUR
Patient in room ICU 2038. I have received report from CHIDI MUJICA  and had the opportunity to 
ask questions and assume patient care.

## 2020-09-04 NOTE — NUR
Received pt from OR to room 2008. report received from Rahul SIMMONS and was able to ask 
questions.  Pt placed on CICU monitors, art line zeroed with good waveform, meds infusing 
through introducer line and PIV. Dressing to abdomen clean, dry, and intact.

## 2020-09-04 NOTE — NUR
Patient in room HOOD 344. I have received report from Vick MUJICA/Juan BARRIOS   and had the 
opportunity to ask questions and assume patient care.

## 2020-09-04 NOTE — NUR
Problems reprioritized. Patient report given, questions answered & plan of care reviewed 
with GUANAKO Payton.

## 2020-09-04 NOTE — NUR
Received from OR via SURGICAL BED , accompanied by Anesthesiologist LAYLA and report 
given by Anesthesiolgist. PATIENT WITH LARGE ABDOMINAL DRESSING THAT IS CDI. MEDICATED FOR 
PAIN UPON ARRIVAL. VSS. WILDER CATHETER PRESENT AND IS WITH CLEAR YELLOW URINE. SCDS DONNED. 
10L MASK ON WITH 100% SATURATIONS.


-------------------------------------------------------------------------------

Addendum: 09/04/20 at 1438 by Maik Kraft RN, RN

-------------------------------------------------------------------------------

Amended: Links added.

## 2020-09-04 NOTE — NUR
PATIENT RETURNED FROM FROM RECOVERY PATIENT HAD LOW BP BUT NOT OUT OF HIS NORM, BUT PATIENT 
WAS SLIGHTLY TACHY AN 'S.  PATIENT WAS PALE BUT STABLE.  AT ABOUT 1655 PHYSICIAN WAS 
NOTIFIED THAT PATIENT BP DROPPED TO 70'2/40'S AND TACHY 'S PATIENT WAS BOLUSED AND 
RAPID WAS CALLED.  PATIENT TRANSFER TO [facility] 2038 UNDER DR. SANDOVAL.  HOSPITALIST NOTIFIED, 
AND SURGEON WAS IN ICU UPON ARRIVAL.

## 2020-09-04 NOTE — NUR
ALL CRITERIA FOR TRANSFER TO THE FLOOR HAS BEEN ACHIEVED. REPORT GIVEN AND ALL QUESTIONS 
ANSWERED, VSS. BED LOW 2 RAILS UP, CALL LIGHT PRESENT AND PATIENT HOOKED UP TO ALL LINES AND 
VSS. PATIENTS RN PRESENT TO ACCEPT CARE. ABDOMINAL DRESSING IS CDI. RN JESICA PRESENT TO 
ACCEPT CARE. LABELED DENTURE CUP WITH SINGLE DENTURE PLACED AT BEDSIDE. PATIENT NGT PLACED 
ON LOW CONTINOUS SUCTION. BP IN 80'S SYSTOLICALLY. NOTIFIED MD RICH THAT PATIENT 
DRESSING IS CDI. NO S/S OF HEMORRHAGE OR LEAKAGE FROM DRESSING . MD AWARE THAT PATIENT HAS 
OCCASIONAL HYPOTENSIVE EPISODES AND IS AWARE OF CURRENT BP AND APPROVES TRANSFER TO THE 
SURGICAL FLOOR AT THIS TIME. RN JESICA TO ADMINISTER MEDICATION DUE AT 1600 FOR HYPOTENSION.


-------------------------------------------------------------------------------

Addendum: 09/04/20 at 1551 by Maik Keane - GUANAKO RN

-------------------------------------------------------------------------------

Amended: Links added.

## 2020-09-04 NOTE — NUR
I agree with Dilia Martinez [facility] HEENA student documentation, and assessment.

## 2020-09-05 VITALS — SYSTOLIC BLOOD PRESSURE: 133 MMHG | DIASTOLIC BLOOD PRESSURE: 75 MMHG

## 2020-09-05 VITALS — DIASTOLIC BLOOD PRESSURE: 78 MMHG | SYSTOLIC BLOOD PRESSURE: 140 MMHG

## 2020-09-05 VITALS — SYSTOLIC BLOOD PRESSURE: 131 MMHG | DIASTOLIC BLOOD PRESSURE: 69 MMHG

## 2020-09-05 VITALS — DIASTOLIC BLOOD PRESSURE: 53 MMHG | SYSTOLIC BLOOD PRESSURE: 104 MMHG

## 2020-09-05 VITALS — SYSTOLIC BLOOD PRESSURE: 119 MMHG | DIASTOLIC BLOOD PRESSURE: 74 MMHG

## 2020-09-05 VITALS — DIASTOLIC BLOOD PRESSURE: 76 MMHG | SYSTOLIC BLOOD PRESSURE: 119 MMHG

## 2020-09-05 VITALS — DIASTOLIC BLOOD PRESSURE: 65 MMHG | SYSTOLIC BLOOD PRESSURE: 143 MMHG

## 2020-09-05 VITALS — DIASTOLIC BLOOD PRESSURE: 59 MMHG | SYSTOLIC BLOOD PRESSURE: 107 MMHG

## 2020-09-05 VITALS — SYSTOLIC BLOOD PRESSURE: 142 MMHG | DIASTOLIC BLOOD PRESSURE: 78 MMHG

## 2020-09-05 VITALS — SYSTOLIC BLOOD PRESSURE: 132 MMHG | DIASTOLIC BLOOD PRESSURE: 77 MMHG

## 2020-09-05 VITALS — DIASTOLIC BLOOD PRESSURE: 68 MMHG | SYSTOLIC BLOOD PRESSURE: 105 MMHG

## 2020-09-05 VITALS — SYSTOLIC BLOOD PRESSURE: 92 MMHG | DIASTOLIC BLOOD PRESSURE: 65 MMHG

## 2020-09-05 VITALS — DIASTOLIC BLOOD PRESSURE: 42 MMHG | SYSTOLIC BLOOD PRESSURE: 125 MMHG

## 2020-09-05 VITALS — SYSTOLIC BLOOD PRESSURE: 104 MMHG | DIASTOLIC BLOOD PRESSURE: 66 MMHG

## 2020-09-05 VITALS — DIASTOLIC BLOOD PRESSURE: 72 MMHG | SYSTOLIC BLOOD PRESSURE: 180 MMHG

## 2020-09-05 VITALS — SYSTOLIC BLOOD PRESSURE: 122 MMHG | DIASTOLIC BLOOD PRESSURE: 60 MMHG

## 2020-09-05 VITALS — SYSTOLIC BLOOD PRESSURE: 109 MMHG | DIASTOLIC BLOOD PRESSURE: 52 MMHG

## 2020-09-05 VITALS — SYSTOLIC BLOOD PRESSURE: 170 MMHG | DIASTOLIC BLOOD PRESSURE: 68 MMHG

## 2020-09-05 VITALS — DIASTOLIC BLOOD PRESSURE: 45 MMHG | SYSTOLIC BLOOD PRESSURE: 131 MMHG

## 2020-09-05 VITALS — DIASTOLIC BLOOD PRESSURE: 53 MMHG | SYSTOLIC BLOOD PRESSURE: 96 MMHG

## 2020-09-05 VITALS — SYSTOLIC BLOOD PRESSURE: 96 MMHG | DIASTOLIC BLOOD PRESSURE: 64 MMHG

## 2020-09-05 VITALS — SYSTOLIC BLOOD PRESSURE: 135 MMHG | DIASTOLIC BLOOD PRESSURE: 78 MMHG

## 2020-09-05 VITALS — SYSTOLIC BLOOD PRESSURE: 140 MMHG | DIASTOLIC BLOOD PRESSURE: 75 MMHG

## 2020-09-05 VITALS — DIASTOLIC BLOOD PRESSURE: 77 MMHG | SYSTOLIC BLOOD PRESSURE: 115 MMHG

## 2020-09-05 LAB
ALBUMIN SERPL BCP-MCNC: 2.4 G/DL (ref 3.4–5)
ALBUMIN/GLOB SERPL: 1 {RATIO} (ref 1.1–1.5)
ALP SERPL-CCNC: 53 IU/L (ref 46–116)
ALT SERPL W P-5'-P-CCNC: 24 U/L (ref 12–78)
ANION GAP SERPL CALCULATED.3IONS-SCNC: 11 MMOL/L (ref 8–16)
AST SERPL W P-5'-P-CCNC: 24 U/L (ref 10–37)
BASE EXCESS BLDA CALC-SCNC: -8.6 MMOL/L (ref -2–2)
BASOPHILS # BLD AUTO: 0 X10'3 (ref 0–0.2)
BASOPHILS NFR BLD AUTO: 0.1 % (ref 0–1)
BILIRUB SERPL-MCNC: 1.9 MG/DL (ref 0.1–1)
BODY TEMPERATURE: 37.5
BUN SERPL-MCNC: 20 MG/DL (ref 7–18)
BUN/CREAT SERPL: 16.9 (ref 5.4–32)
CALCIUM SERPL-MCNC: 7.9 MG/DL (ref 8.5–10.1)
CHLORIDE SERPL-SCNC: 111 MMOL/L (ref 99–107)
CO2 SERPL-SCNC: 17.6 MMOL/L (ref 24–32)
COHGB MFR BLDA: 0.7 % (ref 0–3.9)
CREAT SERPL-MCNC: 1.18 MG/DL (ref 0.6–1.1)
EOSINOPHIL # BLD AUTO: 0 X10'3 (ref 0–0.9)
EOSINOPHIL NFR BLD AUTO: 0.1 % (ref 0–6)
ERYTHROCYTE [DISTWIDTH] IN BLOOD BY AUTOMATED COUNT: 16 % (ref 11.5–14.5)
GFR SERPL CREATININE-BSD FRML MDRD: 64 ML/MIN
GLUCOSE SERPL-MCNC: 125 MG/DL (ref 70–104)
HCO3 BLDA-SCNC: 16.3 MMOL/L (ref 22–26)
HCT VFR BLD AUTO: 29 % (ref 42–52)
HGB BLD-MCNC: 9.8 G/DL (ref 14–17.9)
HGB BLDA-MCNC: 10.1 G/DL (ref 14–18)
LYMPHOCYTES # BLD AUTO: 1.2 X10'3 (ref 1.1–4.8)
LYMPHOCYTES NFR BLD AUTO: 9.8 % (ref 21–51)
MAGNESIUM SERPL-MCNC: 3.1 MG/DL (ref 1.5–2.4)
MCH RBC QN AUTO: 30.3 PG (ref 27–31)
MCHC RBC AUTO-ENTMCNC: 33.9 G/DL (ref 33–36.5)
MCV RBC AUTO: 89.4 FL (ref 78–98)
METHGB MFR BLDA: 0.3 % (ref 0–1.5)
MONOCYTES # BLD AUTO: 1.5 X10'3 (ref 0–0.9)
MONOCYTES NFR BLD AUTO: 12.7 % (ref 2–12)
NEUTROPHILS # BLD AUTO: 9.3 X10'3 (ref 1.8–7.7)
NEUTROPHILS NFR BLD AUTO: 77.3 % (ref 42–75)
O2/TOTAL GAS SETTING VFR VENT: 35 MMHG/%
OXYHGB MFR BLDA: 95.1 % (ref 94–97)
PCO2 TEMP ADJ BLDA: 32.2 MMHG (ref 35–48)
PEEP RESPIRATORY: 5 CM H2O
PHOSPHATE SERPL-MCNC: 2.9 MG/DL (ref 2.3–4.5)
PLATELET # BLD AUTO: 239 X10'3 (ref 140–440)
PMV BLD AUTO: 7.6 FL (ref 7.4–10.4)
PO2 TEMP ADJ BLD: 90.5 MMHG (ref 75–100)
POTASSIUM SERPL-SCNC: 3.8 MMOL/L (ref 3.5–5.1)
PROT SERPL-MCNC: 4.9 G/DL (ref 6.4–8.2)
RBC # BLD AUTO: 3.24 X10'6 (ref 4.7–6.1)
RESP RATE 1H: 14 B/MIN
SAO2 % BLDA: 96.1 % (ref 94–97)
SODIUM SERPL-SCNC: 140 MMOL/L (ref 135–145)
SPONT VT COMPRES GAS VOL SET UNCOR VENT: 500 ML
WBC # BLD AUTO: 12 X10'3 (ref 4.5–11)

## 2020-09-05 RX ADMIN — Medication SCH MG: at 07:30

## 2020-09-05 RX ADMIN — TAZOBACTAM SODIUM AND PIPERACILLIN SODIUM SCH MLS/HR: 375; 3 INJECTION, SOLUTION INTRAVENOUS at 16:44

## 2020-09-05 RX ADMIN — HYDROMORPHONE HYDROCHLORIDE SCH MLS/HR: 10 INJECTION, SOLUTION INTRAMUSCULAR; INTRAVENOUS; SUBCUTANEOUS at 23:00

## 2020-09-05 RX ADMIN — IPRATROPIUM BROMIDE AND ALBUTEROL SULFATE SCH ML: .5; 3 SOLUTION RESPIRATORY (INHALATION) at 20:55

## 2020-09-05 RX ADMIN — CARVEDILOL SCH MG: 6.25 TABLET, FILM COATED ORAL at 07:29

## 2020-09-05 RX ADMIN — IPRATROPIUM BROMIDE AND ALBUTEROL SULFATE SCH ML: .5; 3 SOLUTION RESPIRATORY (INHALATION) at 15:22

## 2020-09-05 RX ADMIN — HEPARIN SODIUM SCH UNIT: 5000 INJECTION, SOLUTION INTRAVENOUS; SUBCUTANEOUS at 10:35

## 2020-09-05 RX ADMIN — HYDROMORPHONE HYDROCHLORIDE SCH MLS/HR: 10 INJECTION, SOLUTION INTRAMUSCULAR; INTRAVENOUS; SUBCUTANEOUS at 05:00

## 2020-09-05 RX ADMIN — Medication SCH CAN: at 18:00

## 2020-09-05 RX ADMIN — HYDROMORPHONE HYDROCHLORIDE SCH MLS/HR: 10 INJECTION, SOLUTION INTRAMUSCULAR; INTRAVENOUS; SUBCUTANEOUS at 21:00

## 2020-09-05 RX ADMIN — HYDROMORPHONE HYDROCHLORIDE SCH MLS/HR: 10 INJECTION, SOLUTION INTRAMUSCULAR; INTRAVENOUS; SUBCUTANEOUS at 13:28

## 2020-09-05 RX ADMIN — NYSTATIN AND TRIAMCINOLONE ACETONIDE SCH APPLIC: 100000; 1 CREAM TOPICAL at 21:00

## 2020-09-05 RX ADMIN — Medication SCH CAN: at 07:30

## 2020-09-05 RX ADMIN — Medication PRN MLS/HR: at 06:26

## 2020-09-05 RX ADMIN — TAZOBACTAM SODIUM AND PIPERACILLIN SODIUM SCH MLS/HR: 375; 3 INJECTION, SOLUTION INTRAVENOUS at 00:18

## 2020-09-05 RX ADMIN — HYDROMORPHONE HYDROCHLORIDE SCH MLS/HR: 10 INJECTION, SOLUTION INTRAMUSCULAR; INTRAVENOUS; SUBCUTANEOUS at 03:00

## 2020-09-05 RX ADMIN — Medication SCH CAN: at 13:00

## 2020-09-05 RX ADMIN — SODIUM CHLORIDE SCH MLS/HR: 9 INJECTION INTRAMUSCULAR; INTRAVENOUS; SUBCUTANEOUS at 13:47

## 2020-09-05 RX ADMIN — MIDODRINE HYDROCHLORIDE SCH MG: 5 TABLET ORAL at 16:00

## 2020-09-05 RX ADMIN — NYSTATIN AND TRIAMCINOLONE ACETONIDE SCH APPLIC: 100000; 1 CREAM TOPICAL at 07:31

## 2020-09-05 RX ADMIN — HYDROMORPHONE HYDROCHLORIDE SCH MLS/HR: 10 INJECTION, SOLUTION INTRAMUSCULAR; INTRAVENOUS; SUBCUTANEOUS at 07:00

## 2020-09-05 RX ADMIN — MIDODRINE HYDROCHLORIDE SCH MG: 5 TABLET ORAL at 07:29

## 2020-09-05 RX ADMIN — HYDROMORPHONE HYDROCHLORIDE SCH MLS/HR: 10 INJECTION, SOLUTION INTRAMUSCULAR; INTRAVENOUS; SUBCUTANEOUS at 01:00

## 2020-09-05 RX ADMIN — MIDODRINE HYDROCHLORIDE SCH MG: 5 TABLET ORAL at 00:17

## 2020-09-05 RX ADMIN — CARVEDILOL SCH MG: 6.25 TABLET, FILM COATED ORAL at 19:20

## 2020-09-05 RX ADMIN — Medication SCH TAB: at 07:30

## 2020-09-05 RX ADMIN — VANCOMYCIN HYDROCHLORIDE SCH MLS/HR: 750 INJECTION, POWDER, LYOPHILIZED, FOR SOLUTION INTRAVENOUS at 13:58

## 2020-09-05 RX ADMIN — HYDROMORPHONE HYDROCHLORIDE SCH MLS/HR: 10 INJECTION, SOLUTION INTRAMUSCULAR; INTRAVENOUS; SUBCUTANEOUS at 19:00

## 2020-09-05 RX ADMIN — NYSTATIN AND TRIAMCINOLONE ACETONIDE SCH APPLIC: 100000; 1 CREAM TOPICAL at 13:00

## 2020-09-05 RX ADMIN — HYDROMORPHONE HYDROCHLORIDE SCH MLS/HR: 10 INJECTION, SOLUTION INTRAMUSCULAR; INTRAVENOUS; SUBCUTANEOUS at 17:00

## 2020-09-05 RX ADMIN — Medication SCH MMU: at 07:29

## 2020-09-05 RX ADMIN — TAZOBACTAM SODIUM AND PIPERACILLIN SODIUM SCH MLS/HR: 375; 3 INJECTION, SOLUTION INTRAVENOUS at 07:30

## 2020-09-05 RX ADMIN — HEPARIN SODIUM SCH UNIT: 5000 INJECTION, SOLUTION INTRAVENOUS; SUBCUTANEOUS at 19:20

## 2020-09-05 RX ADMIN — Medication SCH MMU: at 19:20

## 2020-09-05 RX ADMIN — HYDROMORPHONE HYDROCHLORIDE SCH MLS/HR: 10 INJECTION, SOLUTION INTRAMUSCULAR; INTRAVENOUS; SUBCUTANEOUS at 15:00

## 2020-09-05 NOTE — NUR
RT AT BEDSIDE TO ASK RT INITIAL EVALUATION QUESTIONS, PT REFUSED TO ANSWER QUESTIONS AND 
DIRECTED ME TO ASK EVERYONE ELSE THAT HAS ASKED HIM ABOUT HIS SMOKING HISTORY. UNABLE TO 
COMPLETE RT INITIAL EVAL.

-------------------------------------------------------------------------------

Addendum: 09/05/20 at 1534 by Michelle Ordonez RT

-------------------------------------------------------------------------------

Amended: Links added.

## 2020-09-05 NOTE — NUR
Problems reprioritized. Patient report given, questions answered & plan of care reviewed 
with Gerald MUJICA.

## 2020-09-06 VITALS — SYSTOLIC BLOOD PRESSURE: 130 MMHG | DIASTOLIC BLOOD PRESSURE: 73 MMHG

## 2020-09-06 VITALS — SYSTOLIC BLOOD PRESSURE: 140 MMHG | DIASTOLIC BLOOD PRESSURE: 75 MMHG

## 2020-09-06 VITALS — SYSTOLIC BLOOD PRESSURE: 134 MMHG | DIASTOLIC BLOOD PRESSURE: 74 MMHG

## 2020-09-06 VITALS — DIASTOLIC BLOOD PRESSURE: 75 MMHG | SYSTOLIC BLOOD PRESSURE: 140 MMHG

## 2020-09-06 VITALS — DIASTOLIC BLOOD PRESSURE: 64 MMHG | SYSTOLIC BLOOD PRESSURE: 115 MMHG

## 2020-09-06 VITALS — DIASTOLIC BLOOD PRESSURE: 76 MMHG | SYSTOLIC BLOOD PRESSURE: 144 MMHG

## 2020-09-06 VITALS — DIASTOLIC BLOOD PRESSURE: 62 MMHG | SYSTOLIC BLOOD PRESSURE: 118 MMHG

## 2020-09-06 VITALS — DIASTOLIC BLOOD PRESSURE: 66 MMHG | SYSTOLIC BLOOD PRESSURE: 121 MMHG

## 2020-09-06 VITALS — DIASTOLIC BLOOD PRESSURE: 72 MMHG | SYSTOLIC BLOOD PRESSURE: 129 MMHG

## 2020-09-06 VITALS — SYSTOLIC BLOOD PRESSURE: 134 MMHG | DIASTOLIC BLOOD PRESSURE: 66 MMHG

## 2020-09-06 VITALS — SYSTOLIC BLOOD PRESSURE: 135 MMHG | DIASTOLIC BLOOD PRESSURE: 75 MMHG

## 2020-09-06 VITALS — DIASTOLIC BLOOD PRESSURE: 71 MMHG | SYSTOLIC BLOOD PRESSURE: 129 MMHG

## 2020-09-06 VITALS — SYSTOLIC BLOOD PRESSURE: 123 MMHG | DIASTOLIC BLOOD PRESSURE: 70 MMHG

## 2020-09-06 VITALS — SYSTOLIC BLOOD PRESSURE: 135 MMHG | DIASTOLIC BLOOD PRESSURE: 81 MMHG

## 2020-09-06 VITALS — DIASTOLIC BLOOD PRESSURE: 69 MMHG | SYSTOLIC BLOOD PRESSURE: 129 MMHG

## 2020-09-06 VITALS — DIASTOLIC BLOOD PRESSURE: 66 MMHG | SYSTOLIC BLOOD PRESSURE: 134 MMHG

## 2020-09-06 VITALS — SYSTOLIC BLOOD PRESSURE: 128 MMHG | DIASTOLIC BLOOD PRESSURE: 76 MMHG

## 2020-09-06 VITALS — DIASTOLIC BLOOD PRESSURE: 60 MMHG | SYSTOLIC BLOOD PRESSURE: 117 MMHG

## 2020-09-06 VITALS — SYSTOLIC BLOOD PRESSURE: 132 MMHG | DIASTOLIC BLOOD PRESSURE: 66 MMHG

## 2020-09-06 VITALS — DIASTOLIC BLOOD PRESSURE: 87 MMHG | SYSTOLIC BLOOD PRESSURE: 123 MMHG

## 2020-09-06 VITALS — SYSTOLIC BLOOD PRESSURE: 129 MMHG | DIASTOLIC BLOOD PRESSURE: 69 MMHG

## 2020-09-06 VITALS — DIASTOLIC BLOOD PRESSURE: 65 MMHG | SYSTOLIC BLOOD PRESSURE: 119 MMHG

## 2020-09-06 VITALS — DIASTOLIC BLOOD PRESSURE: 73 MMHG | SYSTOLIC BLOOD PRESSURE: 142 MMHG

## 2020-09-06 VITALS — SYSTOLIC BLOOD PRESSURE: 137 MMHG | DIASTOLIC BLOOD PRESSURE: 74 MMHG

## 2020-09-06 VITALS — DIASTOLIC BLOOD PRESSURE: 71 MMHG | SYSTOLIC BLOOD PRESSURE: 121 MMHG

## 2020-09-06 VITALS — DIASTOLIC BLOOD PRESSURE: 70 MMHG | SYSTOLIC BLOOD PRESSURE: 128 MMHG

## 2020-09-06 VITALS — SYSTOLIC BLOOD PRESSURE: 134 MMHG | DIASTOLIC BLOOD PRESSURE: 77 MMHG

## 2020-09-06 VITALS — DIASTOLIC BLOOD PRESSURE: 77 MMHG | SYSTOLIC BLOOD PRESSURE: 140 MMHG

## 2020-09-06 LAB
ALBUMIN SERPL BCP-MCNC: 2 G/DL (ref 3.4–5)
ALBUMIN/GLOB SERPL: 0.7 {RATIO} (ref 1.1–1.5)
ALP SERPL-CCNC: 48 IU/L (ref 46–116)
ALT SERPL W P-5'-P-CCNC: 17 U/L (ref 12–78)
ANION GAP SERPL CALCULATED.3IONS-SCNC: 9 MMOL/L (ref 8–16)
AST SERPL W P-5'-P-CCNC: 20 U/L (ref 10–37)
BASOPHILS # BLD AUTO: 0 X10'3 (ref 0–0.2)
BASOPHILS # BLD AUTO: 0 X10'3 (ref 0–0.2)
BASOPHILS NFR BLD AUTO: 0.2 % (ref 0–1)
BASOPHILS NFR BLD AUTO: 0.3 % (ref 0–1)
BILIRUB SERPL-MCNC: 0.7 MG/DL (ref 0.1–1)
BUN SERPL-MCNC: 14 MG/DL (ref 7–18)
BUN/CREAT SERPL: 11.3 (ref 5.4–32)
CALCIUM SERPL-MCNC: 8.1 MG/DL (ref 8.5–10.1)
CHLORIDE SERPL-SCNC: 116 MMOL/L (ref 99–107)
CO2 SERPL-SCNC: 18.8 MMOL/L (ref 24–32)
CREAT SERPL-MCNC: 1.24 MG/DL (ref 0.6–1.1)
EOSINOPHIL # BLD AUTO: 0.1 X10'3 (ref 0–0.9)
EOSINOPHIL # BLD AUTO: 0.1 X10'3 (ref 0–0.9)
EOSINOPHIL NFR BLD AUTO: 0.5 % (ref 0–6)
EOSINOPHIL NFR BLD AUTO: 0.6 % (ref 0–6)
ERYTHROCYTE [DISTWIDTH] IN BLOOD BY AUTOMATED COUNT: 16.3 % (ref 11.5–14.5)
ERYTHROCYTE [DISTWIDTH] IN BLOOD BY AUTOMATED COUNT: 17.2 % (ref 11.5–14.5)
ERYTHROCYTE [DISTWIDTH] IN BLOOD BY AUTOMATED COUNT: 17.3 % (ref 11.5–14.5)
ERYTHROCYTE [DISTWIDTH] IN BLOOD BY AUTOMATED COUNT: 17.4 % (ref 11.5–14.5)
GFR SERPL CREATININE-BSD FRML MDRD: 60 ML/MIN
GLUCOSE SERPL-MCNC: 108 MG/DL (ref 70–104)
HCT VFR BLD AUTO: 21 % (ref 42–52)
HCT VFR BLD AUTO: 21.8 % (ref 42–52)
HCT VFR BLD AUTO: 22.2 % (ref 42–52)
HCT VFR BLD AUTO: 27.8 % (ref 42–52)
HGB BLD-MCNC: 7 G/DL (ref 14–17.9)
HGB BLD-MCNC: 7.3 G/DL (ref 14–17.9)
HGB BLD-MCNC: 7.5 G/DL (ref 14–17.9)
HGB BLD-MCNC: 9.5 G/DL (ref 14–17.9)
LYMPHOCYTES # BLD AUTO: 1.4 X10'3 (ref 1.1–4.8)
LYMPHOCYTES # BLD AUTO: 1.6 X10'3 (ref 1.1–4.8)
LYMPHOCYTES NFR BLD AUTO: 12.2 % (ref 21–51)
LYMPHOCYTES NFR BLD AUTO: 12.9 % (ref 21–51)
MAGNESIUM SERPL-MCNC: 1.9 MG/DL (ref 1.5–2.4)
MCH RBC QN AUTO: 30 PG (ref 27–31)
MCH RBC QN AUTO: 30 PG (ref 27–31)
MCH RBC QN AUTO: 30.1 PG (ref 27–31)
MCH RBC QN AUTO: 30.7 PG (ref 27–31)
MCHC RBC AUTO-ENTMCNC: 33.4 G/DL (ref 33–36.5)
MCHC RBC AUTO-ENTMCNC: 33.7 G/DL (ref 33–36.5)
MCHC RBC AUTO-ENTMCNC: 33.7 G/DL (ref 33–36.5)
MCHC RBC AUTO-ENTMCNC: 34.2 G/DL (ref 33–36.5)
MCV RBC AUTO: 89.1 FL (ref 78–98)
MCV RBC AUTO: 89.4 FL (ref 78–98)
MCV RBC AUTO: 89.7 FL (ref 78–98)
MCV RBC AUTO: 89.9 FL (ref 78–98)
MONOCYTES # BLD AUTO: 1.9 X10'3 (ref 0–0.9)
MONOCYTES # BLD AUTO: 1.9 X10'3 (ref 0–0.9)
MONOCYTES NFR BLD AUTO: 15.5 % (ref 2–12)
MONOCYTES NFR BLD AUTO: 17.3 % (ref 2–12)
NEUTROPHILS # BLD AUTO: 7.8 X10'3 (ref 1.8–7.7)
NEUTROPHILS # BLD AUTO: 8.6 X10'3 (ref 1.8–7.7)
NEUTROPHILS NFR BLD AUTO: 69.7 % (ref 42–75)
NEUTROPHILS NFR BLD AUTO: 70.8 % (ref 42–75)
PHOSPHATE SERPL-MCNC: 3 MG/DL (ref 2.3–4.5)
PLATELET # BLD AUTO: 180 X10'3 (ref 140–440)
PLATELET # BLD AUTO: 201 X10'3 (ref 140–440)
PLATELET # BLD AUTO: 205 X10'3 (ref 140–440)
PLATELET # BLD AUTO: 211 X10'3 (ref 140–440)
PMV BLD AUTO: 7.3 FL (ref 7.4–10.4)
PMV BLD AUTO: 7.4 FL (ref 7.4–10.4)
PMV BLD AUTO: 7.4 FL (ref 7.4–10.4)
PMV BLD AUTO: 7.5 FL (ref 7.4–10.4)
POTASSIUM SERPL-SCNC: 3.4 MMOL/L (ref 3.5–5.1)
PROT SERPL-MCNC: 4.8 G/DL (ref 6.4–8.2)
RBC # BLD AUTO: 2.34 X10'6 (ref 4.7–6.1)
RBC # BLD AUTO: 2.45 X10'6 (ref 4.7–6.1)
RBC # BLD AUTO: 2.48 X10'6 (ref 4.7–6.1)
RBC # BLD AUTO: 3.09 X10'6 (ref 4.7–6.1)
SODIUM SERPL-SCNC: 144 MMOL/L (ref 135–145)
WBC # BLD AUTO: 11.2 X10'3 (ref 4.5–11)
WBC # BLD AUTO: 11.4 X10'3 (ref 4.5–11)
WBC # BLD AUTO: 12.1 X10'3 (ref 4.5–11)
WBC # BLD AUTO: 12.6 X10'3 (ref 4.5–11)

## 2020-09-06 RX ADMIN — MINERAL OIL, PETROLATUM SCH INCH: 425; 568 OINTMENT OPHTHALMIC at 14:00

## 2020-09-06 RX ADMIN — SODIUM CHLORIDE SCH MLS/HR: 9 INJECTION INTRAMUSCULAR; INTRAVENOUS; SUBCUTANEOUS at 03:00

## 2020-09-06 RX ADMIN — NYSTATIN AND TRIAMCINOLONE ACETONIDE SCH APPLIC: 100000; 1 CREAM TOPICAL at 21:00

## 2020-09-06 RX ADMIN — TAZOBACTAM SODIUM AND PIPERACILLIN SODIUM SCH MLS/HR: 375; 3 INJECTION, SOLUTION INTRAVENOUS at 16:52

## 2020-09-06 RX ADMIN — MIDODRINE HYDROCHLORIDE SCH MG: 5 TABLET ORAL at 00:00

## 2020-09-06 RX ADMIN — HYDROMORPHONE HYDROCHLORIDE SCH MLS/HR: 10 INJECTION, SOLUTION INTRAMUSCULAR; INTRAVENOUS; SUBCUTANEOUS at 11:00

## 2020-09-06 RX ADMIN — MIDODRINE HYDROCHLORIDE SCH MG: 5 TABLET ORAL at 08:38

## 2020-09-06 RX ADMIN — TAZOBACTAM SODIUM AND PIPERACILLIN SODIUM SCH MLS/HR: 375; 3 INJECTION, SOLUTION INTRAVENOUS at 08:38

## 2020-09-06 RX ADMIN — HEPARIN SODIUM SCH UNIT: 5000 INJECTION, SOLUTION INTRAVENOUS; SUBCUTANEOUS at 20:00

## 2020-09-06 RX ADMIN — HEPARIN SODIUM SCH UNIT: 5000 INJECTION, SOLUTION INTRAVENOUS; SUBCUTANEOUS at 08:40

## 2020-09-06 RX ADMIN — Medication SCH CAN: at 13:00

## 2020-09-06 RX ADMIN — HYDROMORPHONE HYDROCHLORIDE SCH MLS/HR: 10 INJECTION, SOLUTION INTRAMUSCULAR; INTRAVENOUS; SUBCUTANEOUS at 01:00

## 2020-09-06 RX ADMIN — SODIUM CHLORIDE SCH MLS/HR: 9 INJECTION INTRAMUSCULAR; INTRAVENOUS; SUBCUTANEOUS at 14:18

## 2020-09-06 RX ADMIN — IPRATROPIUM BROMIDE AND ALBUTEROL SULFATE SCH ML: .5; 3 SOLUTION RESPIRATORY (INHALATION) at 03:05

## 2020-09-06 RX ADMIN — MIDAZOLAM HYDROCHLORIDE SCH MLS/HR: 5 INJECTION, SOLUTION INTRAMUSCULAR; INTRAVENOUS at 17:20

## 2020-09-06 RX ADMIN — CARVEDILOL SCH MG: 6.25 TABLET, FILM COATED ORAL at 20:00

## 2020-09-06 RX ADMIN — Medication SCH MMU: at 20:00

## 2020-09-06 RX ADMIN — NYSTATIN AND TRIAMCINOLONE ACETONIDE SCH APPLIC: 100000; 1 CREAM TOPICAL at 13:00

## 2020-09-06 RX ADMIN — MINERAL OIL, PETROLATUM SCH INCH: 425; 568 OINTMENT OPHTHALMIC at 01:52

## 2020-09-06 RX ADMIN — HYDROMORPHONE HYDROCHLORIDE SCH MLS/HR: 10 INJECTION, SOLUTION INTRAMUSCULAR; INTRAVENOUS; SUBCUTANEOUS at 23:00

## 2020-09-06 RX ADMIN — IPRATROPIUM BROMIDE AND ALBUTEROL SULFATE SCH ML: .5; 3 SOLUTION RESPIRATORY (INHALATION) at 07:49

## 2020-09-06 RX ADMIN — HYDROMORPHONE HYDROCHLORIDE SCH MLS/HR: 10 INJECTION, SOLUTION INTRAMUSCULAR; INTRAVENOUS; SUBCUTANEOUS at 09:00

## 2020-09-06 RX ADMIN — IPRATROPIUM BROMIDE AND ALBUTEROL SULFATE SCH ML: .5; 3 SOLUTION RESPIRATORY (INHALATION) at 20:15

## 2020-09-06 RX ADMIN — HYDROMORPHONE HYDROCHLORIDE SCH MLS/HR: 10 INJECTION, SOLUTION INTRAMUSCULAR; INTRAVENOUS; SUBCUTANEOUS at 19:00

## 2020-09-06 RX ADMIN — HYDROMORPHONE HYDROCHLORIDE SCH MLS/HR: 10 INJECTION, SOLUTION INTRAMUSCULAR; INTRAVENOUS; SUBCUTANEOUS at 13:00

## 2020-09-06 RX ADMIN — Medication SCH CAN: at 08:00

## 2020-09-06 RX ADMIN — HYDROMORPHONE HYDROCHLORIDE SCH MLS/HR: 10 INJECTION, SOLUTION INTRAMUSCULAR; INTRAVENOUS; SUBCUTANEOUS at 12:42

## 2020-09-06 RX ADMIN — MIDODRINE HYDROCHLORIDE SCH MG: 5 TABLET ORAL at 15:46

## 2020-09-06 RX ADMIN — SODIUM CHLORIDE SCH MLS/HR: 9 INJECTION INTRAMUSCULAR; INTRAVENOUS; SUBCUTANEOUS at 05:11

## 2020-09-06 RX ADMIN — Medication SCH MG: at 08:39

## 2020-09-06 RX ADMIN — Medication SCH TAB: at 08:38

## 2020-09-06 RX ADMIN — HYDROMORPHONE HYDROCHLORIDE SCH MLS/HR: 10 INJECTION, SOLUTION INTRAMUSCULAR; INTRAVENOUS; SUBCUTANEOUS at 03:00

## 2020-09-06 RX ADMIN — HYDROMORPHONE HYDROCHLORIDE SCH MLS/HR: 10 INJECTION, SOLUTION INTRAMUSCULAR; INTRAVENOUS; SUBCUTANEOUS at 07:00

## 2020-09-06 RX ADMIN — HYDROMORPHONE HYDROCHLORIDE SCH MLS/HR: 10 INJECTION, SOLUTION INTRAMUSCULAR; INTRAVENOUS; SUBCUTANEOUS at 05:00

## 2020-09-06 RX ADMIN — MINERAL OIL, PETROLATUM SCH INCH: 425; 568 OINTMENT OPHTHALMIC at 08:39

## 2020-09-06 RX ADMIN — HYDROMORPHONE HYDROCHLORIDE SCH MLS/HR: 10 INJECTION, SOLUTION INTRAMUSCULAR; INTRAVENOUS; SUBCUTANEOUS at 21:00

## 2020-09-06 RX ADMIN — Medication SCH MMU: at 08:41

## 2020-09-06 RX ADMIN — VANCOMYCIN HYDROCHLORIDE SCH MLS/HR: 750 INJECTION, POWDER, LYOPHILIZED, FOR SOLUTION INTRAVENOUS at 15:46

## 2020-09-06 RX ADMIN — TAZOBACTAM SODIUM AND PIPERACILLIN SODIUM SCH MLS/HR: 375; 3 INJECTION, SOLUTION INTRAVENOUS at 00:34

## 2020-09-06 RX ADMIN — CARVEDILOL SCH MG: 6.25 TABLET, FILM COATED ORAL at 08:39

## 2020-09-06 RX ADMIN — HYDROMORPHONE HYDROCHLORIDE SCH MLS/HR: 10 INJECTION, SOLUTION INTRAMUSCULAR; INTRAVENOUS; SUBCUTANEOUS at 17:00

## 2020-09-06 RX ADMIN — NYSTATIN AND TRIAMCINOLONE ACETONIDE SCH APPLIC: 100000; 1 CREAM TOPICAL at 08:00

## 2020-09-06 RX ADMIN — Medication SCH CAN: at 17:21

## 2020-09-06 RX ADMIN — HYDROMORPHONE HYDROCHLORIDE SCH MLS/HR: 10 INJECTION, SOLUTION INTRAMUSCULAR; INTRAVENOUS; SUBCUTANEOUS at 15:00

## 2020-09-06 RX ADMIN — IPRATROPIUM BROMIDE AND ALBUTEROL SULFATE SCH ML: .5; 3 SOLUTION RESPIRATORY (INHALATION) at 14:39

## 2020-09-06 RX ADMIN — VANCOMYCIN HYDROCHLORIDE SCH MLS/HR: 750 INJECTION, POWDER, LYOPHILIZED, FOR SOLUTION INTRAVENOUS at 04:26

## 2020-09-06 NOTE — NUR
Contacted JOSE MANUEL Crane regarding decreased H & H. Patient condition stable VVS. patients abd 
dressing dry and intact. Will repeat labs in AM per PA.

## 2020-09-06 NOTE — NUR
Allen Consult: Allen 11 w/ abdomen surgical wound. Pt s/p ileostomy 

reversal and return to OR for post-op hemorrhage per MD. Remains NPO at 

this time w/ R NG to suction 150ml output noted. 350ml ostomy output 9/4 

pre-op. Hx heavy etoh at least a quart daily causing surgical 

complications on June admit per MS note; receiving B/C vitamin complex 

post-op. Will monitor for PO diet advancement and additional protein needs 

post-op.

Recommendations:

1) advance diet as medically indicated to low-residue post-op

2) once PO diet advancement; Strawberry Ensure Enlive TID, dislikes 

chocolate

3) Honor food preferences once PO: dislikes cream of wheat

4) Bowel care PRN

5) weekly wts

-------------------------------------------------------------------------------

Addendum: 09/06/20 at 1204 by Yemi Rowley RD

-------------------------------------------------------------------------------

Amended: Links added.

## 2020-09-06 NOTE — NUR
Critical Hgb 7.0 and Hct 21.0.  New orders from Dr. Henry to infuse 2 units of blood.  
Will continue to monitor.

## 2020-09-06 NOTE — NUR
Patient in room CICU 2008. I have received report from GUANAKO Dumas   and had the opportunity to 
ask questions and assume patient care.

## 2020-09-07 VITALS — DIASTOLIC BLOOD PRESSURE: 89 MMHG | SYSTOLIC BLOOD PRESSURE: 147 MMHG

## 2020-09-07 VITALS — SYSTOLIC BLOOD PRESSURE: 146 MMHG | DIASTOLIC BLOOD PRESSURE: 81 MMHG

## 2020-09-07 VITALS — DIASTOLIC BLOOD PRESSURE: 62 MMHG | SYSTOLIC BLOOD PRESSURE: 97 MMHG

## 2020-09-07 VITALS — SYSTOLIC BLOOD PRESSURE: 149 MMHG | DIASTOLIC BLOOD PRESSURE: 80 MMHG

## 2020-09-07 VITALS — SYSTOLIC BLOOD PRESSURE: 139 MMHG | DIASTOLIC BLOOD PRESSURE: 86 MMHG

## 2020-09-07 VITALS — DIASTOLIC BLOOD PRESSURE: 79 MMHG | SYSTOLIC BLOOD PRESSURE: 133 MMHG

## 2020-09-07 VITALS — SYSTOLIC BLOOD PRESSURE: 143 MMHG | DIASTOLIC BLOOD PRESSURE: 79 MMHG

## 2020-09-07 VITALS — SYSTOLIC BLOOD PRESSURE: 147 MMHG | DIASTOLIC BLOOD PRESSURE: 88 MMHG

## 2020-09-07 VITALS — SYSTOLIC BLOOD PRESSURE: 113 MMHG | DIASTOLIC BLOOD PRESSURE: 69 MMHG

## 2020-09-07 VITALS — SYSTOLIC BLOOD PRESSURE: 143 MMHG | DIASTOLIC BLOOD PRESSURE: 78 MMHG

## 2020-09-07 VITALS — SYSTOLIC BLOOD PRESSURE: 134 MMHG | DIASTOLIC BLOOD PRESSURE: 70 MMHG

## 2020-09-07 VITALS — DIASTOLIC BLOOD PRESSURE: 79 MMHG | SYSTOLIC BLOOD PRESSURE: 138 MMHG

## 2020-09-07 VITALS — SYSTOLIC BLOOD PRESSURE: 141 MMHG | DIASTOLIC BLOOD PRESSURE: 78 MMHG

## 2020-09-07 VITALS — DIASTOLIC BLOOD PRESSURE: 80 MMHG | SYSTOLIC BLOOD PRESSURE: 142 MMHG

## 2020-09-07 VITALS — DIASTOLIC BLOOD PRESSURE: 81 MMHG | SYSTOLIC BLOOD PRESSURE: 142 MMHG

## 2020-09-07 VITALS — SYSTOLIC BLOOD PRESSURE: 133 MMHG | DIASTOLIC BLOOD PRESSURE: 75 MMHG

## 2020-09-07 VITALS — SYSTOLIC BLOOD PRESSURE: 138 MMHG | DIASTOLIC BLOOD PRESSURE: 78 MMHG

## 2020-09-07 VITALS — SYSTOLIC BLOOD PRESSURE: 140 MMHG | DIASTOLIC BLOOD PRESSURE: 82 MMHG

## 2020-09-07 VITALS — SYSTOLIC BLOOD PRESSURE: 140 MMHG | DIASTOLIC BLOOD PRESSURE: 80 MMHG

## 2020-09-07 VITALS — DIASTOLIC BLOOD PRESSURE: 82 MMHG | SYSTOLIC BLOOD PRESSURE: 135 MMHG

## 2020-09-07 VITALS — DIASTOLIC BLOOD PRESSURE: 73 MMHG | SYSTOLIC BLOOD PRESSURE: 128 MMHG

## 2020-09-07 VITALS — DIASTOLIC BLOOD PRESSURE: 84 MMHG | SYSTOLIC BLOOD PRESSURE: 143 MMHG

## 2020-09-07 LAB
ALBUMIN SERPL BCP-MCNC: 2.1 G/DL (ref 3.4–5)
ALBUMIN/GLOB SERPL: 0.7 {RATIO} (ref 1.1–1.5)
ALP SERPL-CCNC: 72 IU/L (ref 46–116)
ALT SERPL W P-5'-P-CCNC: 18 U/L (ref 12–78)
ANION GAP SERPL CALCULATED.3IONS-SCNC: 13 MMOL/L (ref 8–16)
AST SERPL W P-5'-P-CCNC: 25 U/L (ref 10–37)
BASOPHILS # BLD AUTO: 0 X10'3 (ref 0–0.2)
BASOPHILS NFR BLD AUTO: 0.2 % (ref 0–1)
BILIRUB SERPL-MCNC: 1.3 MG/DL (ref 0.1–1)
BUN SERPL-MCNC: 12 MG/DL (ref 7–18)
BUN/CREAT SERPL: 10 (ref 5.4–32)
CALCIUM SERPL-MCNC: 9 MG/DL (ref 8.5–10.1)
CHLORIDE SERPL-SCNC: 116 MMOL/L (ref 99–107)
CO2 SERPL-SCNC: 18.6 MMOL/L (ref 24–32)
CREAT SERPL-MCNC: 1.2 MG/DL (ref 0.6–1.1)
EOSINOPHIL # BLD AUTO: 0 X10'3 (ref 0–0.9)
EOSINOPHIL NFR BLD AUTO: 0.1 % (ref 0–6)
ERYTHROCYTE [DISTWIDTH] IN BLOOD BY AUTOMATED COUNT: 16.5 % (ref 11.5–14.5)
GFR SERPL CREATININE-BSD FRML MDRD: 62 ML/MIN
GLUCOSE SERPL-MCNC: 93 MG/DL (ref 70–104)
HCT VFR BLD AUTO: 27.2 % (ref 42–52)
HGB BLD-MCNC: 9.3 G/DL (ref 14–17.9)
LYMPHOCYTES # BLD AUTO: 1.4 X10'3 (ref 1.1–4.8)
LYMPHOCYTES NFR BLD AUTO: 12.9 % (ref 21–51)
MAGNESIUM SERPL-MCNC: 1.7 MG/DL (ref 1.5–2.4)
MCH RBC QN AUTO: 30.5 PG (ref 27–31)
MCHC RBC AUTO-ENTMCNC: 34.1 G/DL (ref 33–36.5)
MCV RBC AUTO: 89.5 FL (ref 78–98)
MONOCYTES # BLD AUTO: 1.4 X10'3 (ref 0–0.9)
MONOCYTES NFR BLD AUTO: 12.9 % (ref 2–12)
NEUTROPHILS # BLD AUTO: 8.2 X10'3 (ref 1.8–7.7)
NEUTROPHILS NFR BLD AUTO: 73.9 % (ref 42–75)
PHOSPHATE SERPL-MCNC: 3 MG/DL (ref 2.3–4.5)
PLATELET # BLD AUTO: 185 X10'3 (ref 140–440)
PMV BLD AUTO: 7.8 FL (ref 7.4–10.4)
POTASSIUM SERPL-SCNC: 3.1 MMOL/L (ref 3.5–5.1)
PROT SERPL-MCNC: 5.3 G/DL (ref 6.4–8.2)
RBC # BLD AUTO: 3.04 X10'6 (ref 4.7–6.1)
SODIUM SERPL-SCNC: 148 MMOL/L (ref 135–145)
WBC # BLD AUTO: 11.1 X10'3 (ref 4.5–11)

## 2020-09-07 RX ADMIN — HYDROMORPHONE HYDROCHLORIDE SCH MLS/HR: 10 INJECTION, SOLUTION INTRAMUSCULAR; INTRAVENOUS; SUBCUTANEOUS at 15:00

## 2020-09-07 RX ADMIN — HYDROMORPHONE HYDROCHLORIDE SCH MLS/HR: 10 INJECTION, SOLUTION INTRAMUSCULAR; INTRAVENOUS; SUBCUTANEOUS at 07:00

## 2020-09-07 RX ADMIN — Medication SCH CAN: at 08:00

## 2020-09-07 RX ADMIN — HYDROMORPHONE HYDROCHLORIDE SCH MLS/HR: 10 INJECTION, SOLUTION INTRAMUSCULAR; INTRAVENOUS; SUBCUTANEOUS at 21:00

## 2020-09-07 RX ADMIN — DIATRIZOATE MEGLUMINE AND DIATRIZOATE SODIUM SCH ML: 660; 100 LIQUID ORAL; RECTAL at 21:57

## 2020-09-07 RX ADMIN — CARVEDILOL SCH MG: 6.25 TABLET, FILM COATED ORAL at 08:00

## 2020-09-07 RX ADMIN — Medication SCH TAB: at 08:00

## 2020-09-07 RX ADMIN — TAZOBACTAM SODIUM AND PIPERACILLIN SODIUM SCH MLS/HR: 375; 3 INJECTION, SOLUTION INTRAVENOUS at 00:03

## 2020-09-07 RX ADMIN — MIDODRINE HYDROCHLORIDE SCH MG: 5 TABLET ORAL at 08:00

## 2020-09-07 RX ADMIN — NYSTATIN AND TRIAMCINOLONE ACETONIDE SCH APPLIC: 100000; 1 CREAM TOPICAL at 08:00

## 2020-09-07 RX ADMIN — NYSTATIN AND TRIAMCINOLONE ACETONIDE SCH APPLIC: 100000; 1 CREAM TOPICAL at 13:43

## 2020-09-07 RX ADMIN — HEPARIN SODIUM SCH UNIT: 5000 INJECTION, SOLUTION INTRAVENOUS; SUBCUTANEOUS at 19:53

## 2020-09-07 RX ADMIN — HYDROMORPHONE HYDROCHLORIDE SCH MLS/HR: 10 INJECTION, SOLUTION INTRAMUSCULAR; INTRAVENOUS; SUBCUTANEOUS at 23:00

## 2020-09-07 RX ADMIN — HYDROMORPHONE HYDROCHLORIDE SCH MLS/HR: 10 INJECTION, SOLUTION INTRAMUSCULAR; INTRAVENOUS; SUBCUTANEOUS at 01:00

## 2020-09-07 RX ADMIN — VANCOMYCIN HYDROCHLORIDE SCH MLS/HR: 750 INJECTION, POWDER, LYOPHILIZED, FOR SOLUTION INTRAVENOUS at 16:25

## 2020-09-07 RX ADMIN — VANCOMYCIN HYDROCHLORIDE SCH MLS/HR: 750 INJECTION, POWDER, LYOPHILIZED, FOR SOLUTION INTRAVENOUS at 04:00

## 2020-09-07 RX ADMIN — Medication SCH MG: at 08:00

## 2020-09-07 RX ADMIN — IPRATROPIUM BROMIDE AND ALBUTEROL SULFATE SCH ML: .5; 3 SOLUTION RESPIRATORY (INHALATION) at 03:00

## 2020-09-07 RX ADMIN — IPRATROPIUM BROMIDE AND ALBUTEROL SULFATE SCH ML: .5; 3 SOLUTION RESPIRATORY (INHALATION) at 20:16

## 2020-09-07 RX ADMIN — IPRATROPIUM BROMIDE AND ALBUTEROL SULFATE SCH ML: .5; 3 SOLUTION RESPIRATORY (INHALATION) at 08:33

## 2020-09-07 RX ADMIN — NYSTATIN AND TRIAMCINOLONE ACETONIDE SCH APPLIC: 100000; 1 CREAM TOPICAL at 21:57

## 2020-09-07 RX ADMIN — HYDROMORPHONE HYDROCHLORIDE SCH MLS/HR: 10 INJECTION, SOLUTION INTRAMUSCULAR; INTRAVENOUS; SUBCUTANEOUS at 05:00

## 2020-09-07 RX ADMIN — HYDROMORPHONE HYDROCHLORIDE SCH MLS/HR: 10 INJECTION, SOLUTION INTRAMUSCULAR; INTRAVENOUS; SUBCUTANEOUS at 17:00

## 2020-09-07 RX ADMIN — SODIUM CHLORIDE SCH MLS/HR: 9 INJECTION INTRAMUSCULAR; INTRAVENOUS; SUBCUTANEOUS at 09:30

## 2020-09-07 RX ADMIN — SODIUM CHLORIDE SCH MLS/HR: 9 INJECTION INTRAMUSCULAR; INTRAVENOUS; SUBCUTANEOUS at 14:36

## 2020-09-07 RX ADMIN — SODIUM CHLORIDE SCH MLS/HR: 9 INJECTION INTRAMUSCULAR; INTRAVENOUS; SUBCUTANEOUS at 01:11

## 2020-09-07 RX ADMIN — TAZOBACTAM SODIUM AND PIPERACILLIN SODIUM SCH MLS/HR: 375; 3 INJECTION, SOLUTION INTRAVENOUS at 08:03

## 2020-09-07 RX ADMIN — HYDROMORPHONE HYDROCHLORIDE SCH MLS/HR: 10 INJECTION, SOLUTION INTRAMUSCULAR; INTRAVENOUS; SUBCUTANEOUS at 09:00

## 2020-09-07 RX ADMIN — IPRATROPIUM BROMIDE AND ALBUTEROL SULFATE SCH ML: .5; 3 SOLUTION RESPIRATORY (INHALATION) at 15:45

## 2020-09-07 RX ADMIN — HYDROMORPHONE HYDROCHLORIDE SCH MLS/HR: 10 INJECTION, SOLUTION INTRAMUSCULAR; INTRAVENOUS; SUBCUTANEOUS at 11:00

## 2020-09-07 RX ADMIN — CARVEDILOL SCH MG: 6.25 TABLET, FILM COATED ORAL at 19:54

## 2020-09-07 RX ADMIN — MIDODRINE HYDROCHLORIDE SCH MG: 5 TABLET ORAL at 00:03

## 2020-09-07 RX ADMIN — POTASSIUM CHLORIDE PRN MLS/HR: 14.9 INJECTION, SOLUTION INTRAVENOUS at 09:48

## 2020-09-07 RX ADMIN — HYDROMORPHONE HYDROCHLORIDE SCH MLS/HR: 10 INJECTION, SOLUTION INTRAMUSCULAR; INTRAVENOUS; SUBCUTANEOUS at 19:00

## 2020-09-07 RX ADMIN — Medication SCH MMU: at 08:00

## 2020-09-07 RX ADMIN — POTASSIUM CHLORIDE PRN MLS/HR: 14.9 INJECTION, SOLUTION INTRAVENOUS at 07:21

## 2020-09-07 RX ADMIN — HEPARIN SODIUM SCH UNIT: 5000 INJECTION, SOLUTION INTRAVENOUS; SUBCUTANEOUS at 08:01

## 2020-09-07 RX ADMIN — TAZOBACTAM SODIUM AND PIPERACILLIN SODIUM SCH MLS/HR: 375; 3 INJECTION, SOLUTION INTRAVENOUS at 16:25

## 2020-09-07 RX ADMIN — Medication SCH MMU: at 19:54

## 2020-09-07 RX ADMIN — HYDROMORPHONE HYDROCHLORIDE SCH MLS/HR: 10 INJECTION, SOLUTION INTRAMUSCULAR; INTRAVENOUS; SUBCUTANEOUS at 13:00

## 2020-09-07 RX ADMIN — HYDROMORPHONE HYDROCHLORIDE SCH MLS/HR: 10 INJECTION, SOLUTION INTRAMUSCULAR; INTRAVENOUS; SUBCUTANEOUS at 03:00

## 2020-09-08 VITALS — SYSTOLIC BLOOD PRESSURE: 145 MMHG | DIASTOLIC BLOOD PRESSURE: 89 MMHG

## 2020-09-08 VITALS — DIASTOLIC BLOOD PRESSURE: 73 MMHG | SYSTOLIC BLOOD PRESSURE: 135 MMHG

## 2020-09-08 VITALS — DIASTOLIC BLOOD PRESSURE: 86 MMHG | SYSTOLIC BLOOD PRESSURE: 133 MMHG

## 2020-09-08 VITALS — DIASTOLIC BLOOD PRESSURE: 79 MMHG | SYSTOLIC BLOOD PRESSURE: 133 MMHG

## 2020-09-08 VITALS — SYSTOLIC BLOOD PRESSURE: 134 MMHG | DIASTOLIC BLOOD PRESSURE: 96 MMHG

## 2020-09-08 VITALS — SYSTOLIC BLOOD PRESSURE: 119 MMHG | DIASTOLIC BLOOD PRESSURE: 69 MMHG

## 2020-09-08 VITALS — SYSTOLIC BLOOD PRESSURE: 136 MMHG | DIASTOLIC BLOOD PRESSURE: 84 MMHG

## 2020-09-08 VITALS — SYSTOLIC BLOOD PRESSURE: 136 MMHG | DIASTOLIC BLOOD PRESSURE: 80 MMHG

## 2020-09-08 VITALS — DIASTOLIC BLOOD PRESSURE: 81 MMHG | SYSTOLIC BLOOD PRESSURE: 145 MMHG

## 2020-09-08 VITALS — DIASTOLIC BLOOD PRESSURE: 72 MMHG | SYSTOLIC BLOOD PRESSURE: 124 MMHG

## 2020-09-08 VITALS — DIASTOLIC BLOOD PRESSURE: 82 MMHG | SYSTOLIC BLOOD PRESSURE: 133 MMHG

## 2020-09-08 VITALS — DIASTOLIC BLOOD PRESSURE: 82 MMHG | SYSTOLIC BLOOD PRESSURE: 132 MMHG

## 2020-09-08 VITALS — DIASTOLIC BLOOD PRESSURE: 84 MMHG | SYSTOLIC BLOOD PRESSURE: 145 MMHG

## 2020-09-08 VITALS — SYSTOLIC BLOOD PRESSURE: 144 MMHG | DIASTOLIC BLOOD PRESSURE: 82 MMHG

## 2020-09-08 VITALS — SYSTOLIC BLOOD PRESSURE: 148 MMHG | DIASTOLIC BLOOD PRESSURE: 85 MMHG

## 2020-09-08 VITALS — DIASTOLIC BLOOD PRESSURE: 78 MMHG | SYSTOLIC BLOOD PRESSURE: 130 MMHG

## 2020-09-08 VITALS — SYSTOLIC BLOOD PRESSURE: 148 MMHG | DIASTOLIC BLOOD PRESSURE: 82 MMHG

## 2020-09-08 VITALS — DIASTOLIC BLOOD PRESSURE: 82 MMHG | SYSTOLIC BLOOD PRESSURE: 142 MMHG

## 2020-09-08 VITALS — SYSTOLIC BLOOD PRESSURE: 135 MMHG | DIASTOLIC BLOOD PRESSURE: 82 MMHG

## 2020-09-08 VITALS — SYSTOLIC BLOOD PRESSURE: 144 MMHG | DIASTOLIC BLOOD PRESSURE: 80 MMHG

## 2020-09-08 VITALS — DIASTOLIC BLOOD PRESSURE: 80 MMHG | SYSTOLIC BLOOD PRESSURE: 135 MMHG

## 2020-09-08 VITALS — DIASTOLIC BLOOD PRESSURE: 80 MMHG | SYSTOLIC BLOOD PRESSURE: 144 MMHG

## 2020-09-08 VITALS — DIASTOLIC BLOOD PRESSURE: 89 MMHG | SYSTOLIC BLOOD PRESSURE: 149 MMHG

## 2020-09-08 LAB
ALBUMIN SERPL BCP-MCNC: 1.8 G/DL (ref 3.4–5)
ALBUMIN SERPL BCP-MCNC: 2 G/DL (ref 3.4–5)
ALBUMIN/GLOB SERPL: 0.6 {RATIO} (ref 1.1–1.5)
ALBUMIN/GLOB SERPL: 0.6 {RATIO} (ref 1.1–1.5)
ALP SERPL-CCNC: 79 IU/L (ref 46–116)
ALP SERPL-CCNC: 80 IU/L (ref 46–116)
ALT SERPL W P-5'-P-CCNC: 13 U/L (ref 12–78)
ALT SERPL W P-5'-P-CCNC: 15 U/L (ref 12–78)
ANION GAP SERPL CALCULATED.3IONS-SCNC: 11 MMOL/L (ref 8–16)
ANION GAP SERPL CALCULATED.3IONS-SCNC: 13 MMOL/L (ref 8–16)
AST SERPL W P-5'-P-CCNC: 14 U/L (ref 10–37)
AST SERPL W P-5'-P-CCNC: 18 U/L (ref 10–37)
BASOPHILS # BLD AUTO: 0 X10'3 (ref 0–0.2)
BASOPHILS NFR BLD AUTO: 0.2 % (ref 0–1)
BILIRUB SERPL-MCNC: 0.8 MG/DL (ref 0.1–1)
BILIRUB SERPL-MCNC: 1 MG/DL (ref 0.1–1)
BUN SERPL-MCNC: 13 MG/DL (ref 7–18)
BUN SERPL-MCNC: 14 MG/DL (ref 7–18)
BUN/CREAT SERPL: 11 (ref 5.4–32)
BUN/CREAT SERPL: 9.8 (ref 5.4–32)
CALCIUM SERPL-MCNC: 8.1 MG/DL (ref 8.5–10.1)
CALCIUM SERPL-MCNC: 8.8 MG/DL (ref 8.5–10.1)
CHLORIDE SERPL-SCNC: 111 MMOL/L (ref 99–107)
CHLORIDE SERPL-SCNC: 119 MMOL/L (ref 99–107)
CO2 SERPL-SCNC: 18.9 MMOL/L (ref 24–32)
CO2 SERPL-SCNC: 21.3 MMOL/L (ref 24–32)
CREAT SERPL-MCNC: 1.27 MG/DL (ref 0.6–1.1)
CREAT SERPL-MCNC: 1.33 MG/DL (ref 0.6–1.1)
EOSINOPHIL # BLD AUTO: 0 X10'3 (ref 0–0.9)
EOSINOPHIL NFR BLD AUTO: 0.2 % (ref 0–6)
ERYTHROCYTE [DISTWIDTH] IN BLOOD BY AUTOMATED COUNT: 17.1 % (ref 11.5–14.5)
GFR SERPL CREATININE-BSD FRML MDRD: 55 ML/MIN
GFR SERPL CREATININE-BSD FRML MDRD: 58 ML/MIN
GLUCOSE SERPL-MCNC: 416 MG/DL (ref 70–104)
GLUCOSE SERPL-MCNC: 93 MG/DL (ref 70–104)
HCT VFR BLD AUTO: 27.4 % (ref 42–52)
HGB BLD-MCNC: 9.2 G/DL (ref 14–17.9)
LYMPHOCYTES # BLD AUTO: 1.2 X10'3 (ref 1.1–4.8)
LYMPHOCYTES NFR BLD AUTO: 10.1 % (ref 21–51)
MAGNESIUM SERPL-MCNC: 1.4 MG/DL (ref 1.5–2.4)
MAGNESIUM SERPL-MCNC: 1.6 MG/DL (ref 1.5–2.4)
MCH RBC QN AUTO: 30.3 PG (ref 27–31)
MCHC RBC AUTO-ENTMCNC: 33.5 G/DL (ref 33–36.5)
MCV RBC AUTO: 90.6 FL (ref 78–98)
MONOCYTES # BLD AUTO: 1.3 X10'3 (ref 0–0.9)
MONOCYTES NFR BLD AUTO: 11.3 % (ref 2–12)
NEUTROPHILS # BLD AUTO: 9.2 X10'3 (ref 1.8–7.7)
NEUTROPHILS NFR BLD AUTO: 78.2 % (ref 42–75)
PHOSPHATE SERPL-MCNC: 2.1 MG/DL (ref 2.3–4.5)
PLATELET # BLD AUTO: 209 X10'3 (ref 140–440)
PMV BLD AUTO: 7.8 FL (ref 7.4–10.4)
POTASSIUM SERPL-SCNC: 3.2 MMOL/L (ref 3.5–5.1)
POTASSIUM SERPL-SCNC: 3.2 MMOL/L (ref 3.5–5.1)
PREALB SERPL-MCNC: 13.1 MG/DL (ref 19–36)
PROT SERPL-MCNC: 4.9 G/DL (ref 6.4–8.2)
PROT SERPL-MCNC: 5.4 G/DL (ref 6.4–8.2)
RBC # BLD AUTO: 3.03 X10'6 (ref 4.7–6.1)
SODIUM SERPL-SCNC: 143 MMOL/L (ref 135–145)
SODIUM SERPL-SCNC: 151 MMOL/L (ref 135–145)
TRIGL SERPL-MCNC: 85 MG/DL (ref 20–135)
WBC # BLD AUTO: 11.7 X10'3 (ref 4.5–11)

## 2020-09-08 PROCEDURE — B548ZZA ULTRASONOGRAPHY OF SUPERIOR VENA CAVA, GUIDANCE: ICD-10-PCS | Performed by: SURGERY

## 2020-09-08 PROCEDURE — 02HV33Z INSERTION OF INFUSION DEVICE INTO SUPERIOR VENA CAVA, PERCUTANEOUS APPROACH: ICD-10-PCS | Performed by: SURGERY

## 2020-09-08 RX ADMIN — DEXTROSE SCH MLS/HR: 5 SOLUTION INTRAVENOUS at 19:51

## 2020-09-08 RX ADMIN — NYSTATIN AND TRIAMCINOLONE ACETONIDE SCH APPLIC: 100000; 1 CREAM TOPICAL at 08:42

## 2020-09-08 RX ADMIN — POTASSIUM CHLORIDE PRN MLS/HR: 14.9 INJECTION, SOLUTION INTRAVENOUS at 21:20

## 2020-09-08 RX ADMIN — DEXTROSE SCH MLS/HR: 5 SOLUTION INTRAVENOUS at 09:15

## 2020-09-08 RX ADMIN — MAGNESIUM SULFATE IN WATER PRN MLS/HR: 40 INJECTION, SOLUTION INTRAVENOUS at 19:56

## 2020-09-08 RX ADMIN — CARVEDILOL SCH MG: 6.25 TABLET, FILM COATED ORAL at 19:53

## 2020-09-08 RX ADMIN — POTASSIUM CHLORIDE PRN MLS/HR: 14.9 INJECTION, SOLUTION INTRAVENOUS at 12:06

## 2020-09-08 RX ADMIN — CARVEDILOL SCH MG: 6.25 TABLET, FILM COATED ORAL at 08:00

## 2020-09-08 RX ADMIN — HYDROMORPHONE HYDROCHLORIDE SCH MLS/HR: 10 INJECTION, SOLUTION INTRAMUSCULAR; INTRAVENOUS; SUBCUTANEOUS at 03:00

## 2020-09-08 RX ADMIN — HYDROMORPHONE HYDROCHLORIDE SCH MLS/HR: 10 INJECTION, SOLUTION INTRAMUSCULAR; INTRAVENOUS; SUBCUTANEOUS at 07:00

## 2020-09-08 RX ADMIN — HYDROMORPHONE HYDROCHLORIDE SCH MLS/HR: 10 INJECTION, SOLUTION INTRAMUSCULAR; INTRAVENOUS; SUBCUTANEOUS at 09:00

## 2020-09-08 RX ADMIN — TAZOBACTAM SODIUM AND PIPERACILLIN SODIUM SCH MLS/HR: 375; 3 INJECTION, SOLUTION INTRAVENOUS at 08:44

## 2020-09-08 RX ADMIN — HYDROMORPHONE HYDROCHLORIDE SCH MLS/HR: 10 INJECTION, SOLUTION INTRAMUSCULAR; INTRAVENOUS; SUBCUTANEOUS at 15:00

## 2020-09-08 RX ADMIN — POTASSIUM CHLORIDE PRN MLS/HR: 14.9 INJECTION, SOLUTION INTRAVENOUS at 10:10

## 2020-09-08 RX ADMIN — VANCOMYCIN HYDROCHLORIDE SCH MLS/HR: 500 INJECTION, POWDER, LYOPHILIZED, FOR SOLUTION INTRAVENOUS at 16:29

## 2020-09-08 RX ADMIN — Medication SCH TAB: at 08:00

## 2020-09-08 RX ADMIN — IPRATROPIUM BROMIDE AND ALBUTEROL SULFATE SCH ML: .5; 3 SOLUTION RESPIRATORY (INHALATION) at 09:29

## 2020-09-08 RX ADMIN — HYDROMORPHONE HYDROCHLORIDE SCH MLS/HR: 10 INJECTION, SOLUTION INTRAMUSCULAR; INTRAVENOUS; SUBCUTANEOUS at 01:00

## 2020-09-08 RX ADMIN — Medication SCH MG: at 08:00

## 2020-09-08 RX ADMIN — Medication SCH MMU: at 08:00

## 2020-09-08 RX ADMIN — IPRATROPIUM BROMIDE AND ALBUTEROL SULFATE SCH ML: .5; 3 SOLUTION RESPIRATORY (INHALATION) at 14:45

## 2020-09-08 RX ADMIN — DIATRIZOATE MEGLUMINE AND DIATRIZOATE SODIUM SCH ML: 660; 100 LIQUID ORAL; RECTAL at 12:06

## 2020-09-08 RX ADMIN — TAZOBACTAM SODIUM AND PIPERACILLIN SODIUM SCH MLS/HR: 375; 3 INJECTION, SOLUTION INTRAVENOUS at 16:29

## 2020-09-08 RX ADMIN — HYDROMORPHONE HYDROCHLORIDE SCH MLS/HR: 10 INJECTION, SOLUTION INTRAMUSCULAR; INTRAVENOUS; SUBCUTANEOUS at 21:00

## 2020-09-08 RX ADMIN — DIATRIZOATE MEGLUMINE AND DIATRIZOATE SODIUM SCH ML: 660; 100 LIQUID ORAL; RECTAL at 08:42

## 2020-09-08 RX ADMIN — VANCOMYCIN HYDROCHLORIDE SCH MLS/HR: 500 INJECTION, POWDER, LYOPHILIZED, FOR SOLUTION INTRAVENOUS at 03:58

## 2020-09-08 RX ADMIN — IPRATROPIUM BROMIDE AND ALBUTEROL SULFATE SCH ML: .5; 3 SOLUTION RESPIRATORY (INHALATION) at 21:04

## 2020-09-08 RX ADMIN — TAZOBACTAM SODIUM AND PIPERACILLIN SODIUM SCH MLS/HR: 375; 3 INJECTION, SOLUTION INTRAVENOUS at 00:25

## 2020-09-08 RX ADMIN — NYSTATIN AND TRIAMCINOLONE ACETONIDE SCH APPLIC: 100000; 1 CREAM TOPICAL at 13:00

## 2020-09-08 RX ADMIN — Medication SCH MMU: at 19:53

## 2020-09-08 RX ADMIN — HEPARIN SODIUM SCH UNIT: 5000 INJECTION, SOLUTION INTRAVENOUS; SUBCUTANEOUS at 19:53

## 2020-09-08 RX ADMIN — TAZOBACTAM SODIUM AND PIPERACILLIN SODIUM SCH MLS/HR: 375; 3 INJECTION, SOLUTION INTRAVENOUS at 23:53

## 2020-09-08 RX ADMIN — HEPARIN SODIUM SCH UNIT: 5000 INJECTION, SOLUTION INTRAVENOUS; SUBCUTANEOUS at 08:43

## 2020-09-08 RX ADMIN — IPRATROPIUM BROMIDE AND ALBUTEROL SULFATE SCH ML: .5; 3 SOLUTION RESPIRATORY (INHALATION) at 03:00

## 2020-09-08 RX ADMIN — HYDROMORPHONE HYDROCHLORIDE SCH MLS/HR: 10 INJECTION, SOLUTION INTRAMUSCULAR; INTRAVENOUS; SUBCUTANEOUS at 19:00

## 2020-09-08 RX ADMIN — HYDROMORPHONE HYDROCHLORIDE SCH MLS/HR: 10 INJECTION, SOLUTION INTRAMUSCULAR; INTRAVENOUS; SUBCUTANEOUS at 23:00

## 2020-09-08 RX ADMIN — HYDROMORPHONE HYDROCHLORIDE SCH MLS/HR: 10 INJECTION, SOLUTION INTRAMUSCULAR; INTRAVENOUS; SUBCUTANEOUS at 17:00

## 2020-09-08 RX ADMIN — HYDROMORPHONE HYDROCHLORIDE SCH MLS/HR: 10 INJECTION, SOLUTION INTRAMUSCULAR; INTRAVENOUS; SUBCUTANEOUS at 05:00

## 2020-09-08 RX ADMIN — NYSTATIN AND TRIAMCINOLONE ACETONIDE SCH APPLIC: 100000; 1 CREAM TOPICAL at 19:53

## 2020-09-08 RX ADMIN — HYDROMORPHONE HYDROCHLORIDE SCH MLS/HR: 10 INJECTION, SOLUTION INTRAMUSCULAR; INTRAVENOUS; SUBCUTANEOUS at 11:00

## 2020-09-08 RX ADMIN — POTASSIUM CHLORIDE PRN MLS/HR: 14.9 INJECTION, SOLUTION INTRAVENOUS at 19:56

## 2020-09-08 NOTE — NUR
Received call from radiologist, stating that she needed to speak with MD regarding critical 
CT results.  This nurse gave Dr. Henry' and Dr. Gallagher's phone numbers.

## 2020-09-08 NOTE — NUR
TPN consult: Pt pending PICC placement at this time. TPN recommendations 

below have been d/w clinical pharmacist. Pt with ileus, pt pending f/u CT 

today per physician notes. Pt documented with 2L out from NG tube 9/7 per 

I&O. D/w MD recommendation for routine Thiamine, Folic acid, and MVI given 

recent EtOH hx however MD declines at this time. Will continue to follow 

closely.

Recommendations:

1) Continuous 3:1 Clinimix-E 5/20 2L bag with 100 mL 20% intralipids with 

goal rate of 75 mL/hr to provide: 1800 mL total volume/day, 1679 kcal, 86 

g AA, 343 g dextrose (dext load 4.64 mg/kg/min), and 17 g lipids (10% of 

kcal)

2) Prealbumin and TG q Monday/Thursday

3) Daily weights

4) Advance diet as medically indicated to low-residue

5) Once PO diet advancement; Strawberry Ensure Enlive TID, dislikes

chocolate

6) Honor food preferences once PO: dislikes cream of wheat

7) Bowel care PRN

-------------------------------------------------------------------------------

Addendum: 09/08/20 at 1240 by Mecca Pavon RD

-------------------------------------------------------------------------------

Amended: Links added.

## 2020-09-08 NOTE — NUR
Patient in room CICU 2008. I have received report from Petrona MUJICA and had the opportunity to 
ask questions and assume patient care.

## 2020-09-09 VITALS — SYSTOLIC BLOOD PRESSURE: 128 MMHG | DIASTOLIC BLOOD PRESSURE: 73 MMHG

## 2020-09-09 VITALS — DIASTOLIC BLOOD PRESSURE: 82 MMHG | SYSTOLIC BLOOD PRESSURE: 132 MMHG

## 2020-09-09 VITALS — DIASTOLIC BLOOD PRESSURE: 78 MMHG | SYSTOLIC BLOOD PRESSURE: 133 MMHG

## 2020-09-09 VITALS — SYSTOLIC BLOOD PRESSURE: 123 MMHG | DIASTOLIC BLOOD PRESSURE: 74 MMHG

## 2020-09-09 VITALS — DIASTOLIC BLOOD PRESSURE: 70 MMHG | SYSTOLIC BLOOD PRESSURE: 123 MMHG

## 2020-09-09 VITALS — SYSTOLIC BLOOD PRESSURE: 127 MMHG | DIASTOLIC BLOOD PRESSURE: 78 MMHG

## 2020-09-09 VITALS — SYSTOLIC BLOOD PRESSURE: 129 MMHG | DIASTOLIC BLOOD PRESSURE: 78 MMHG

## 2020-09-09 VITALS — SYSTOLIC BLOOD PRESSURE: 131 MMHG | DIASTOLIC BLOOD PRESSURE: 73 MMHG

## 2020-09-09 VITALS — DIASTOLIC BLOOD PRESSURE: 73 MMHG | SYSTOLIC BLOOD PRESSURE: 130 MMHG

## 2020-09-09 VITALS — DIASTOLIC BLOOD PRESSURE: 82 MMHG | SYSTOLIC BLOOD PRESSURE: 135 MMHG

## 2020-09-09 VITALS — SYSTOLIC BLOOD PRESSURE: 144 MMHG | DIASTOLIC BLOOD PRESSURE: 73 MMHG

## 2020-09-09 VITALS — SYSTOLIC BLOOD PRESSURE: 129 MMHG | DIASTOLIC BLOOD PRESSURE: 76 MMHG

## 2020-09-09 VITALS — DIASTOLIC BLOOD PRESSURE: 83 MMHG | SYSTOLIC BLOOD PRESSURE: 137 MMHG

## 2020-09-09 VITALS — SYSTOLIC BLOOD PRESSURE: 124 MMHG | DIASTOLIC BLOOD PRESSURE: 71 MMHG

## 2020-09-09 VITALS — SYSTOLIC BLOOD PRESSURE: 128 MMHG | DIASTOLIC BLOOD PRESSURE: 78 MMHG

## 2020-09-09 VITALS — SYSTOLIC BLOOD PRESSURE: 122 MMHG | DIASTOLIC BLOOD PRESSURE: 68 MMHG

## 2020-09-09 VITALS — DIASTOLIC BLOOD PRESSURE: 73 MMHG | SYSTOLIC BLOOD PRESSURE: 126 MMHG

## 2020-09-09 LAB
ALBUMIN SERPL BCP-MCNC: 1.9 G/DL (ref 3.4–5)
ALBUMIN/GLOB SERPL: 0.6 {RATIO} (ref 1.1–1.5)
ALP SERPL-CCNC: 73 IU/L (ref 46–116)
ALT SERPL W P-5'-P-CCNC: 14 U/L (ref 12–78)
ANION GAP SERPL CALCULATED.3IONS-SCNC: 9 MMOL/L (ref 8–16)
AST SERPL W P-5'-P-CCNC: 14 U/L (ref 10–37)
BASOPHILS # BLD AUTO: 0 X10'3 (ref 0–0.2)
BASOPHILS NFR BLD AUTO: 0.3 % (ref 0–1)
BILIRUB SERPL-MCNC: 0.7 MG/DL (ref 0.1–1)
BUN SERPL-MCNC: 15 MG/DL (ref 7–18)
BUN/CREAT SERPL: 13.3 (ref 5.4–32)
CALCIUM SERPL-MCNC: 8.4 MG/DL (ref 8.5–10.1)
CHLORIDE SERPL-SCNC: 116 MMOL/L (ref 99–107)
CO2 SERPL-SCNC: 23.5 MMOL/L (ref 24–32)
CREAT SERPL-MCNC: 1.13 MG/DL (ref 0.6–1.1)
EOSINOPHIL # BLD AUTO: 0.2 X10'3 (ref 0–0.9)
EOSINOPHIL NFR BLD AUTO: 2.5 % (ref 0–6)
ERYTHROCYTE [DISTWIDTH] IN BLOOD BY AUTOMATED COUNT: 16.9 % (ref 11.5–14.5)
GFR SERPL CREATININE-BSD FRML MDRD: 67 ML/MIN
GLUCOSE SERPL-MCNC: 186 MG/DL (ref 70–104)
HCT VFR BLD AUTO: 26.7 % (ref 42–52)
HGB BLD-MCNC: 9 G/DL (ref 14–17.9)
LYMPHOCYTES # BLD AUTO: 1.5 X10'3 (ref 1.1–4.8)
LYMPHOCYTES NFR BLD AUTO: 15.1 % (ref 21–51)
MAGNESIUM SERPL-MCNC: 1.9 MG/DL (ref 1.5–2.4)
MCH RBC QN AUTO: 30.7 PG (ref 27–31)
MCHC RBC AUTO-ENTMCNC: 33.7 G/DL (ref 33–36.5)
MCV RBC AUTO: 91 FL (ref 78–98)
MONOCYTES # BLD AUTO: 1.2 X10'3 (ref 0–0.9)
MONOCYTES NFR BLD AUTO: 12.1 % (ref 2–12)
NEUTROPHILS # BLD AUTO: 6.9 X10'3 (ref 1.8–7.7)
NEUTROPHILS NFR BLD AUTO: 70 % (ref 42–75)
PHOSPHATE SERPL-MCNC: 1.6 MG/DL (ref 2.3–4.5)
PLATELET # BLD AUTO: 215 X10'3 (ref 140–440)
PMV BLD AUTO: 7.9 FL (ref 7.4–10.4)
POTASSIUM SERPL-SCNC: 3.4 MMOL/L (ref 3.5–5.1)
PROT SERPL-MCNC: 5.2 G/DL (ref 6.4–8.2)
RBC # BLD AUTO: 2.94 X10'6 (ref 4.7–6.1)
SODIUM SERPL-SCNC: 148 MMOL/L (ref 135–145)
WBC # BLD AUTO: 9.8 X10'3 (ref 4.5–11)

## 2020-09-09 RX ADMIN — HEPARIN SODIUM SCH UNIT: 5000 INJECTION, SOLUTION INTRAVENOUS; SUBCUTANEOUS at 20:50

## 2020-09-09 RX ADMIN — DEXTROSE SCH MLS/HR: 5 SOLUTION INTRAVENOUS at 18:47

## 2020-09-09 RX ADMIN — VANCOMYCIN HYDROCHLORIDE SCH MLS/HR: 500 INJECTION, POWDER, LYOPHILIZED, FOR SOLUTION INTRAVENOUS at 03:48

## 2020-09-09 RX ADMIN — Medication SCH MMU: at 08:20

## 2020-09-09 RX ADMIN — IPRATROPIUM BROMIDE AND ALBUTEROL SULFATE SCH ML: .5; 3 SOLUTION RESPIRATORY (INHALATION) at 19:48

## 2020-09-09 RX ADMIN — Medication SCH MG: at 08:20

## 2020-09-09 RX ADMIN — SOYBEAN OIL SCH MLS/HR: 20 INJECTION, SOLUTION INTRAVENOUS at 23:56

## 2020-09-09 RX ADMIN — POTASSIUM CHLORIDE PRN MLS/HR: 14.9 INJECTION, SOLUTION INTRAVENOUS at 05:56

## 2020-09-09 RX ADMIN — Medication PRN MG: at 21:27

## 2020-09-09 RX ADMIN — HYDROMORPHONE HYDROCHLORIDE SCH MLS/HR: 10 INJECTION, SOLUTION INTRAMUSCULAR; INTRAVENOUS; SUBCUTANEOUS at 15:00

## 2020-09-09 RX ADMIN — HYDROMORPHONE HYDROCHLORIDE SCH MLS/HR: 10 INJECTION, SOLUTION INTRAMUSCULAR; INTRAVENOUS; SUBCUTANEOUS at 09:00

## 2020-09-09 RX ADMIN — HYDROMORPHONE HYDROCHLORIDE SCH MLS/HR: 10 INJECTION, SOLUTION INTRAMUSCULAR; INTRAVENOUS; SUBCUTANEOUS at 21:00

## 2020-09-09 RX ADMIN — POTASSIUM CHLORIDE PRN MLS/HR: 14.9 INJECTION, SOLUTION INTRAVENOUS at 04:59

## 2020-09-09 RX ADMIN — Medication SCH MMU: at 20:55

## 2020-09-09 RX ADMIN — VANCOMYCIN HYDROCHLORIDE SCH MLS/HR: 500 INJECTION, POWDER, LYOPHILIZED, FOR SOLUTION INTRAVENOUS at 17:24

## 2020-09-09 RX ADMIN — HYDROMORPHONE HYDROCHLORIDE SCH MLS/HR: 10 INJECTION, SOLUTION INTRAMUSCULAR; INTRAVENOUS; SUBCUTANEOUS at 05:00

## 2020-09-09 RX ADMIN — NYSTATIN AND TRIAMCINOLONE ACETONIDE SCH APPLIC: 100000; 1 CREAM TOPICAL at 08:00

## 2020-09-09 RX ADMIN — TAZOBACTAM SODIUM AND PIPERACILLIN SODIUM SCH MLS/HR: 375; 3 INJECTION, SOLUTION INTRAVENOUS at 23:56

## 2020-09-09 RX ADMIN — Medication SCH TAB: at 08:20

## 2020-09-09 RX ADMIN — NYSTATIN AND TRIAMCINOLONE ACETONIDE SCH APPLIC: 100000; 1 CREAM TOPICAL at 13:00

## 2020-09-09 RX ADMIN — HEPARIN SODIUM SCH UNIT: 5000 INJECTION, SOLUTION INTRAVENOUS; SUBCUTANEOUS at 08:21

## 2020-09-09 RX ADMIN — DEXTROSE SCH MLS/HR: 5 SOLUTION INTRAVENOUS at 08:19

## 2020-09-09 RX ADMIN — HYDROMORPHONE HYDROCHLORIDE SCH MLS/HR: 10 INJECTION, SOLUTION INTRAMUSCULAR; INTRAVENOUS; SUBCUTANEOUS at 11:00

## 2020-09-09 RX ADMIN — IPRATROPIUM BROMIDE AND ALBUTEROL SULFATE SCH ML: .5; 3 SOLUTION RESPIRATORY (INHALATION) at 03:00

## 2020-09-09 RX ADMIN — HYDROMORPHONE HYDROCHLORIDE SCH MLS/HR: 10 INJECTION, SOLUTION INTRAMUSCULAR; INTRAVENOUS; SUBCUTANEOUS at 23:00

## 2020-09-09 RX ADMIN — IPRATROPIUM BROMIDE AND ALBUTEROL SULFATE SCH ML: .5; 3 SOLUTION RESPIRATORY (INHALATION) at 07:52

## 2020-09-09 RX ADMIN — HYDROMORPHONE HYDROCHLORIDE SCH MLS/HR: 10 INJECTION, SOLUTION INTRAMUSCULAR; INTRAVENOUS; SUBCUTANEOUS at 21:25

## 2020-09-09 RX ADMIN — CARVEDILOL SCH MG: 6.25 TABLET, FILM COATED ORAL at 20:55

## 2020-09-09 RX ADMIN — TAZOBACTAM SODIUM AND PIPERACILLIN SODIUM SCH MLS/HR: 375; 3 INJECTION, SOLUTION INTRAVENOUS at 18:51

## 2020-09-09 RX ADMIN — HYDROMORPHONE HYDROCHLORIDE SCH MLS/HR: 10 INJECTION, SOLUTION INTRAMUSCULAR; INTRAVENOUS; SUBCUTANEOUS at 01:00

## 2020-09-09 RX ADMIN — NYSTATIN AND TRIAMCINOLONE ACETONIDE SCH APPLIC: 100000; 1 CREAM TOPICAL at 21:00

## 2020-09-09 RX ADMIN — HYDROMORPHONE HYDROCHLORIDE SCH MLS/HR: 10 INJECTION, SOLUTION INTRAMUSCULAR; INTRAVENOUS; SUBCUTANEOUS at 17:00

## 2020-09-09 RX ADMIN — HYDROMORPHONE HYDROCHLORIDE SCH MLS/HR: 10 INJECTION, SOLUTION INTRAMUSCULAR; INTRAVENOUS; SUBCUTANEOUS at 07:00

## 2020-09-09 RX ADMIN — CARVEDILOL SCH MG: 6.25 TABLET, FILM COATED ORAL at 08:20

## 2020-09-09 RX ADMIN — HYDROMORPHONE HYDROCHLORIDE SCH MLS/HR: 10 INJECTION, SOLUTION INTRAMUSCULAR; INTRAVENOUS; SUBCUTANEOUS at 13:00

## 2020-09-09 RX ADMIN — TAZOBACTAM SODIUM AND PIPERACILLIN SODIUM SCH MLS/HR: 375; 3 INJECTION, SOLUTION INTRAVENOUS at 08:19

## 2020-09-09 RX ADMIN — HYDROMORPHONE HYDROCHLORIDE SCH MLS/HR: 10 INJECTION, SOLUTION INTRAMUSCULAR; INTRAVENOUS; SUBCUTANEOUS at 03:00

## 2020-09-09 RX ADMIN — IPRATROPIUM BROMIDE AND ALBUTEROL SULFATE SCH ML: .5; 3 SOLUTION RESPIRATORY (INHALATION) at 14:12

## 2020-09-09 NOTE — NUR
Message from Pharmacy : Sodium phos is compatible with zosyn and the dilaudid. Please hang 
the vanco first since it is time sensitive. Then you can hang the sodium phos along with the 
zosyn. Thank you, pharmacy [phone]

## 2020-09-09 NOTE — NUR
Problems reprioritized. Patient report given, questions answered & plan of care reviewed 
with Petrona MUJICA.

## 2020-09-09 NOTE — NUR
Problems reprioritized. Patient report given, questions answered & plan of care reviewed 
with Aspen PHELPS RN.

## 2020-09-09 NOTE — NUR
Pt arrived from ICU via w/c, accompanied by Petrona MUJICA. Pt assessed. Agree with 0800 
assessment entry except no EKG findings on Med/Surg. BLL, SRupx2, call light in reach, pt 
oriented to room, frequent rounding, pt close to nurses station. PICC line to PAUL and PIV to 
RFA patent infusing fluids per MD orders. F/C patent draining clear yellow urine. 

-------------------------------------------------------------------------------

Addendum: 09/09/20 at 2018 by Lakeisha Keller RN

-------------------------------------------------------------------------------

Pt dressing to lower abdomen, midline incision, CDI with small amt of dried serosanguinous 
drainage to lower aspect of dressing.

## 2020-09-10 VITALS — SYSTOLIC BLOOD PRESSURE: 137 MMHG | DIASTOLIC BLOOD PRESSURE: 82 MMHG

## 2020-09-10 VITALS — DIASTOLIC BLOOD PRESSURE: 80 MMHG | SYSTOLIC BLOOD PRESSURE: 120 MMHG

## 2020-09-10 VITALS — DIASTOLIC BLOOD PRESSURE: 78 MMHG | SYSTOLIC BLOOD PRESSURE: 128 MMHG

## 2020-09-10 VITALS — DIASTOLIC BLOOD PRESSURE: 81 MMHG | SYSTOLIC BLOOD PRESSURE: 130 MMHG

## 2020-09-10 LAB
ALBUMIN SERPL BCP-MCNC: 1.8 G/DL (ref 3.4–5)
ALBUMIN/GLOB SERPL: 0.6 {RATIO} (ref 1.1–1.5)
ALP SERPL-CCNC: 76 IU/L (ref 46–116)
ALT SERPL W P-5'-P-CCNC: 15 U/L (ref 12–78)
ANION GAP SERPL CALCULATED.3IONS-SCNC: 7 MMOL/L (ref 8–16)
AST SERPL W P-5'-P-CCNC: 15 U/L (ref 10–37)
BASOPHILS # BLD AUTO: 0 X10'3 (ref 0–0.2)
BASOPHILS NFR BLD AUTO: 0.5 % (ref 0–1)
BILIRUB SERPL-MCNC: 0.6 MG/DL (ref 0.1–1)
BUN SERPL-MCNC: 18 MG/DL (ref 7–18)
BUN/CREAT SERPL: 18.4 (ref 5.4–32)
CALCIUM SERPL-MCNC: 8.1 MG/DL (ref 8.5–10.1)
CHLORIDE SERPL-SCNC: 108 MMOL/L (ref 99–107)
CO2 SERPL-SCNC: 27.7 MMOL/L (ref 24–32)
CREAT SERPL-MCNC: 0.98 MG/DL (ref 0.6–1.1)
EOSINOPHIL # BLD AUTO: 0.5 X10'3 (ref 0–0.9)
EOSINOPHIL NFR BLD AUTO: 5.2 % (ref 0–6)
ERYTHROCYTE [DISTWIDTH] IN BLOOD BY AUTOMATED COUNT: 16.3 % (ref 11.5–14.5)
GFR SERPL CREATININE-BSD FRML MDRD: 79 ML/MIN
GLUCOSE SERPL-MCNC: 142 MG/DL (ref 70–104)
HCT VFR BLD AUTO: 25 % (ref 42–52)
HGB BLD-MCNC: 8.4 G/DL (ref 14–17.9)
LYMPHOCYTES # BLD AUTO: 1.8 X10'3 (ref 1.1–4.8)
LYMPHOCYTES NFR BLD AUTO: 20.2 % (ref 21–51)
MAGNESIUM SERPL-MCNC: 1.4 MG/DL (ref 1.5–2.4)
MCH RBC QN AUTO: 30.8 PG (ref 27–31)
MCHC RBC AUTO-ENTMCNC: 33.7 G/DL (ref 33–36.5)
MCV RBC AUTO: 91.3 FL (ref 78–98)
MONOCYTES # BLD AUTO: 1.2 X10'3 (ref 0–0.9)
MONOCYTES NFR BLD AUTO: 13.2 % (ref 2–12)
NEUTROPHILS # BLD AUTO: 5.6 X10'3 (ref 1.8–7.7)
NEUTROPHILS NFR BLD AUTO: 60.9 % (ref 42–75)
PHOSPHATE SERPL-MCNC: 4.2 MG/DL (ref 2.3–4.5)
PLATELET # BLD AUTO: 199 X10'3 (ref 140–440)
PMV BLD AUTO: 8.5 FL (ref 7.4–10.4)
POTASSIUM SERPL-SCNC: 3 MMOL/L (ref 3.5–5.1)
PREALB SERPL-MCNC: 13.7 MG/DL (ref 19–36)
PROT SERPL-MCNC: 5 G/DL (ref 6.4–8.2)
RBC # BLD AUTO: 2.74 X10'6 (ref 4.7–6.1)
SODIUM SERPL-SCNC: 143 MMOL/L (ref 135–145)
VANCOMYCIN SERPL-MCNC: 17.9 UG/ML (ref 6–14)
WBC # BLD AUTO: 9.1 X10'3 (ref 4.5–11)

## 2020-09-10 RX ADMIN — HYDROMORPHONE HYDROCHLORIDE SCH MLS/HR: 10 INJECTION, SOLUTION INTRAMUSCULAR; INTRAVENOUS; SUBCUTANEOUS at 23:00

## 2020-09-10 RX ADMIN — NYSTATIN AND TRIAMCINOLONE ACETONIDE SCH APPLIC: 100000; 1 CREAM TOPICAL at 08:02

## 2020-09-10 RX ADMIN — HYDROMORPHONE HYDROCHLORIDE SCH MLS/HR: 10 INJECTION, SOLUTION INTRAMUSCULAR; INTRAVENOUS; SUBCUTANEOUS at 05:00

## 2020-09-10 RX ADMIN — CARVEDILOL SCH MG: 6.25 TABLET, FILM COATED ORAL at 07:49

## 2020-09-10 RX ADMIN — VANCOMYCIN HYDROCHLORIDE SCH MLS/HR: 500 INJECTION, POWDER, LYOPHILIZED, FOR SOLUTION INTRAVENOUS at 15:54

## 2020-09-10 RX ADMIN — NYSTATIN AND TRIAMCINOLONE ACETONIDE SCH APPLIC: 100000; 1 CREAM TOPICAL at 12:48

## 2020-09-10 RX ADMIN — IPRATROPIUM BROMIDE AND ALBUTEROL SULFATE SCH ML: .5; 3 SOLUTION RESPIRATORY (INHALATION) at 08:37

## 2020-09-10 RX ADMIN — IPRATROPIUM BROMIDE AND ALBUTEROL SULFATE SCH ML: .5; 3 SOLUTION RESPIRATORY (INHALATION) at 14:42

## 2020-09-10 RX ADMIN — Medication SCH TAB: at 07:50

## 2020-09-10 RX ADMIN — HYDROMORPHONE HYDROCHLORIDE SCH MLS/HR: 10 INJECTION, SOLUTION INTRAMUSCULAR; INTRAVENOUS; SUBCUTANEOUS at 21:00

## 2020-09-10 RX ADMIN — HYDROMORPHONE HYDROCHLORIDE SCH MLS/HR: 10 INJECTION, SOLUTION INTRAMUSCULAR; INTRAVENOUS; SUBCUTANEOUS at 03:00

## 2020-09-10 RX ADMIN — Medication SCH CAN: at 18:00

## 2020-09-10 RX ADMIN — SOYBEAN OIL SCH MLS/HR: 20 INJECTION, SOLUTION INTRAVENOUS at 21:38

## 2020-09-10 RX ADMIN — Medication SCH MMU: at 07:49

## 2020-09-10 RX ADMIN — HEPARIN SODIUM SCH UNIT: 5000 INJECTION, SOLUTION INTRAVENOUS; SUBCUTANEOUS at 07:50

## 2020-09-10 RX ADMIN — TAZOBACTAM SODIUM AND PIPERACILLIN SODIUM SCH MLS/HR: 375; 3 INJECTION, SOLUTION INTRAVENOUS at 07:43

## 2020-09-10 RX ADMIN — Medication SCH MG: at 07:49

## 2020-09-10 RX ADMIN — Medication SCH CAN: at 12:48

## 2020-09-10 RX ADMIN — Medication SCH CAN: at 08:00

## 2020-09-10 RX ADMIN — HYDROMORPHONE HYDROCHLORIDE SCH MLS/HR: 10 INJECTION, SOLUTION INTRAMUSCULAR; INTRAVENOUS; SUBCUTANEOUS at 11:00

## 2020-09-10 RX ADMIN — HYDROMORPHONE HYDROCHLORIDE SCH MLS/HR: 10 INJECTION, SOLUTION INTRAMUSCULAR; INTRAVENOUS; SUBCUTANEOUS at 17:00

## 2020-09-10 RX ADMIN — Medication PRN MG: at 19:44

## 2020-09-10 RX ADMIN — POTASSIUM CHLORIDE PRN MLS/HR: 7.46 INJECTION, SOLUTION INTRAVENOUS at 17:05

## 2020-09-10 RX ADMIN — HYDROMORPHONE HYDROCHLORIDE SCH MLS/HR: 10 INJECTION, SOLUTION INTRAMUSCULAR; INTRAVENOUS; SUBCUTANEOUS at 13:00

## 2020-09-10 RX ADMIN — IPRATROPIUM BROMIDE AND ALBUTEROL SULFATE SCH ML: .5; 3 SOLUTION RESPIRATORY (INHALATION) at 03:57

## 2020-09-10 RX ADMIN — HYDROMORPHONE HYDROCHLORIDE SCH MLS/HR: 10 INJECTION, SOLUTION INTRAMUSCULAR; INTRAVENOUS; SUBCUTANEOUS at 19:00

## 2020-09-10 RX ADMIN — CARVEDILOL SCH MG: 6.25 TABLET, FILM COATED ORAL at 19:44

## 2020-09-10 RX ADMIN — POTASSIUM CHLORIDE PRN MLS/HR: 7.46 INJECTION, SOLUTION INTRAVENOUS at 20:33

## 2020-09-10 RX ADMIN — DEXTROSE SCH MLS/HR: 5 SOLUTION INTRAVENOUS at 01:35

## 2020-09-10 RX ADMIN — HEPARIN SODIUM SCH UNIT: 5000 INJECTION, SOLUTION INTRAVENOUS; SUBCUTANEOUS at 19:45

## 2020-09-10 RX ADMIN — IPRATROPIUM BROMIDE AND ALBUTEROL SULFATE SCH ML: .5; 3 SOLUTION RESPIRATORY (INHALATION) at 20:26

## 2020-09-10 RX ADMIN — POTASSIUM CHLORIDE PRN MLS/HR: 7.46 INJECTION, SOLUTION INTRAVENOUS at 15:54

## 2020-09-10 RX ADMIN — Medication SCH MMU: at 19:44

## 2020-09-10 RX ADMIN — HYDROMORPHONE HYDROCHLORIDE SCH MLS/HR: 10 INJECTION, SOLUTION INTRAMUSCULAR; INTRAVENOUS; SUBCUTANEOUS at 09:00

## 2020-09-10 RX ADMIN — HYDROMORPHONE HYDROCHLORIDE SCH MLS/HR: 10 INJECTION, SOLUTION INTRAMUSCULAR; INTRAVENOUS; SUBCUTANEOUS at 15:00

## 2020-09-10 RX ADMIN — POTASSIUM CHLORIDE PRN MLS/HR: 7.46 INJECTION, SOLUTION INTRAVENOUS at 19:23

## 2020-09-10 RX ADMIN — DEXTROSE SCH MLS/HR: 5 SOLUTION INTRAVENOUS at 07:32

## 2020-09-10 RX ADMIN — TAZOBACTAM SODIUM AND PIPERACILLIN SODIUM SCH MLS/HR: 375; 3 INJECTION, SOLUTION INTRAVENOUS at 17:08

## 2020-09-10 RX ADMIN — NYSTATIN AND TRIAMCINOLONE ACETONIDE SCH APPLIC: 100000; 1 CREAM TOPICAL at 21:00

## 2020-09-10 RX ADMIN — HYDROMORPHONE HYDROCHLORIDE SCH MLS/HR: 10 INJECTION, SOLUTION INTRAMUSCULAR; INTRAVENOUS; SUBCUTANEOUS at 01:00

## 2020-09-10 RX ADMIN — Medication PRN MG: at 07:50

## 2020-09-10 RX ADMIN — HYDROMORPHONE HYDROCHLORIDE SCH MLS/HR: 10 INJECTION, SOLUTION INTRAMUSCULAR; INTRAVENOUS; SUBCUTANEOUS at 07:00

## 2020-09-10 RX ADMIN — VANCOMYCIN HYDROCHLORIDE SCH MLS/HR: 500 INJECTION, POWDER, LYOPHILIZED, FOR SOLUTION INTRAVENOUS at 03:44

## 2020-09-10 NOTE — NUR
Problems reprioritized. Patient report given, questions answered & plan of care reviewed 
with GUANAKO NAYLOR.

## 2020-09-10 NOTE — NUR
Problems reprioritized. Patient report given, questions answered & plan of care reviewed 
with GUANAKO Multani.

## 2020-09-10 NOTE — NUR
PATIENT IS AWARE OF HIS CRITICAL POTASSIUM LEVEL. HE REFUSES TO TAKE HIS 2ND DOSE OF 40MEQ 
K-DUR. SAYS HE WILL "TRY LATER". I WILL KEEP ATTEMPTING TO GIVE THIS MEDICATION TO PATIENT 
AND CONTINUE TEACHING HIM THE IMPORTANCE OF CORRECTING HIS ELECTROLYTE LEVEL.

## 2020-09-10 NOTE — NUR
ZELALEM SHORT 349B : DO YOU HAVE TIME TO PUT A PIV IN THIS PATIENT. VERY HARD STICK, I BELIEVE 
YOU'VE DONE ALL HIS IV'S

THANKS.

## 2020-09-11 VITALS — SYSTOLIC BLOOD PRESSURE: 146 MMHG | DIASTOLIC BLOOD PRESSURE: 86 MMHG

## 2020-09-11 VITALS — DIASTOLIC BLOOD PRESSURE: 86 MMHG | SYSTOLIC BLOOD PRESSURE: 139 MMHG

## 2020-09-11 VITALS — SYSTOLIC BLOOD PRESSURE: 143 MMHG | DIASTOLIC BLOOD PRESSURE: 77 MMHG

## 2020-09-11 LAB
MAGNESIUM SERPL-MCNC: 1.4 MG/DL (ref 1.5–2.4)
POTASSIUM SERPL-SCNC: 3.7 MMOL/L (ref 3.5–5.1)

## 2020-09-11 RX ADMIN — TAZOBACTAM SODIUM AND PIPERACILLIN SODIUM SCH MLS/HR: 375; 3 INJECTION, SOLUTION INTRAVENOUS at 16:51

## 2020-09-11 RX ADMIN — IPRATROPIUM BROMIDE AND ALBUTEROL SULFATE SCH ML: .5; 3 SOLUTION RESPIRATORY (INHALATION) at 08:08

## 2020-09-11 RX ADMIN — VANCOMYCIN HYDROCHLORIDE SCH MLS/HR: 500 INJECTION, POWDER, LYOPHILIZED, FOR SOLUTION INTRAVENOUS at 04:08

## 2020-09-11 RX ADMIN — HYDROMORPHONE HYDROCHLORIDE SCH MLS/HR: 10 INJECTION, SOLUTION INTRAMUSCULAR; INTRAVENOUS; SUBCUTANEOUS at 09:00

## 2020-09-11 RX ADMIN — IPRATROPIUM BROMIDE AND ALBUTEROL SULFATE SCH ML: .5; 3 SOLUTION RESPIRATORY (INHALATION) at 03:21

## 2020-09-11 RX ADMIN — HYDROMORPHONE HYDROCHLORIDE SCH MLS/HR: 10 INJECTION, SOLUTION INTRAMUSCULAR; INTRAVENOUS; SUBCUTANEOUS at 13:00

## 2020-09-11 RX ADMIN — Medication SCH MMU: at 07:16

## 2020-09-11 RX ADMIN — HYDROMORPHONE HYDROCHLORIDE SCH MLS/HR: 10 INJECTION, SOLUTION INTRAMUSCULAR; INTRAVENOUS; SUBCUTANEOUS at 15:00

## 2020-09-11 RX ADMIN — Medication PRN MG: at 21:56

## 2020-09-11 RX ADMIN — MAGNESIUM SULFATE IN WATER PRN MLS/HR: 40 INJECTION, SOLUTION INTRAVENOUS at 15:07

## 2020-09-11 RX ADMIN — Medication SCH CAN: at 18:00

## 2020-09-11 RX ADMIN — TAZOBACTAM SODIUM AND PIPERACILLIN SODIUM SCH MLS/HR: 375; 3 INJECTION, SOLUTION INTRAVENOUS at 07:28

## 2020-09-11 RX ADMIN — VANCOMYCIN HYDROCHLORIDE SCH MLS/HR: 500 INJECTION, POWDER, LYOPHILIZED, FOR SOLUTION INTRAVENOUS at 15:48

## 2020-09-11 RX ADMIN — NYSTATIN AND TRIAMCINOLONE ACETONIDE SCH APPLIC: 100000; 1 CREAM TOPICAL at 08:00

## 2020-09-11 RX ADMIN — IPRATROPIUM BROMIDE AND ALBUTEROL SULFATE SCH ML: .5; 3 SOLUTION RESPIRATORY (INHALATION) at 20:21

## 2020-09-11 RX ADMIN — CARVEDILOL SCH MG: 6.25 TABLET, FILM COATED ORAL at 19:58

## 2020-09-11 RX ADMIN — Medication SCH CAN: at 07:28

## 2020-09-11 RX ADMIN — Medication SCH MG: at 07:16

## 2020-09-11 RX ADMIN — HYDROMORPHONE HYDROCHLORIDE SCH MLS/HR: 10 INJECTION, SOLUTION INTRAMUSCULAR; INTRAVENOUS; SUBCUTANEOUS at 01:00

## 2020-09-11 RX ADMIN — HEPARIN SODIUM SCH UNIT: 5000 INJECTION, SOLUTION INTRAVENOUS; SUBCUTANEOUS at 19:58

## 2020-09-11 RX ADMIN — HYDROMORPHONE HYDROCHLORIDE SCH MLS/HR: 10 INJECTION, SOLUTION INTRAMUSCULAR; INTRAVENOUS; SUBCUTANEOUS at 05:00

## 2020-09-11 RX ADMIN — HEPARIN SODIUM SCH UNIT: 5000 INJECTION, SOLUTION INTRAVENOUS; SUBCUTANEOUS at 07:17

## 2020-09-11 RX ADMIN — HYDROMORPHONE HYDROCHLORIDE SCH MLS/HR: 10 INJECTION, SOLUTION INTRAMUSCULAR; INTRAVENOUS; SUBCUTANEOUS at 17:00

## 2020-09-11 RX ADMIN — NYSTATIN AND TRIAMCINOLONE ACETONIDE SCH APPLIC: 100000; 1 CREAM TOPICAL at 21:00

## 2020-09-11 RX ADMIN — IPRATROPIUM BROMIDE AND ALBUTEROL SULFATE SCH ML: .5; 3 SOLUTION RESPIRATORY (INHALATION) at 13:58

## 2020-09-11 RX ADMIN — Medication SCH MMU: at 19:58

## 2020-09-11 RX ADMIN — HYDROMORPHONE HYDROCHLORIDE SCH MLS/HR: 10 INJECTION, SOLUTION INTRAMUSCULAR; INTRAVENOUS; SUBCUTANEOUS at 03:00

## 2020-09-11 RX ADMIN — TAZOBACTAM SODIUM AND PIPERACILLIN SODIUM SCH MLS/HR: 375; 3 INJECTION, SOLUTION INTRAVENOUS at 00:09

## 2020-09-11 RX ADMIN — Medication SCH CAN: at 13:00

## 2020-09-11 RX ADMIN — HYDROMORPHONE HYDROCHLORIDE SCH MLS/HR: 10 INJECTION, SOLUTION INTRAMUSCULAR; INTRAVENOUS; SUBCUTANEOUS at 11:00

## 2020-09-11 RX ADMIN — NYSTATIN AND TRIAMCINOLONE ACETONIDE SCH APPLIC: 100000; 1 CREAM TOPICAL at 13:00

## 2020-09-11 RX ADMIN — SOYBEAN OIL SCH MLS/HR: 20 INJECTION, SOLUTION INTRAVENOUS at 22:17

## 2020-09-11 RX ADMIN — HYDROMORPHONE HYDROCHLORIDE SCH MLS/HR: 10 INJECTION, SOLUTION INTRAMUSCULAR; INTRAVENOUS; SUBCUTANEOUS at 07:00

## 2020-09-11 RX ADMIN — MAGNESIUM SULFATE IN WATER PRN MLS/HR: 40 INJECTION, SOLUTION INTRAVENOUS at 10:55

## 2020-09-11 RX ADMIN — HYDROMORPHONE HYDROCHLORIDE SCH MLS/HR: 10 INJECTION, SOLUTION INTRAMUSCULAR; INTRAVENOUS; SUBCUTANEOUS at 22:13

## 2020-09-11 RX ADMIN — HYDROMORPHONE HYDROCHLORIDE SCH MLS/HR: 10 INJECTION, SOLUTION INTRAMUSCULAR; INTRAVENOUS; SUBCUTANEOUS at 19:00

## 2020-09-11 RX ADMIN — Medication SCH TAB: at 07:16

## 2020-09-11 RX ADMIN — HYDROMORPHONE HYDROCHLORIDE SCH MLS/HR: 10 INJECTION, SOLUTION INTRAMUSCULAR; INTRAVENOUS; SUBCUTANEOUS at 21:00

## 2020-09-11 RX ADMIN — CARVEDILOL SCH MG: 6.25 TABLET, FILM COATED ORAL at 07:17

## 2020-09-11 NOTE — NUR
Patient in room HOOD 349. I have received report from  GUANAKO Christensen  and had the opportunity to 
ask questions and assume patient care.

-------------------------------------------------------------------------------

Addendum: 09/11/20 at 1845 by Michelle Rivera RN

-------------------------------------------------------------------------------

Amended: Links added.

## 2020-09-11 NOTE — NUR
PICC nurse came to see patient's swollen RUE and stated to monitor for the rest of the night 
for now

## 2020-09-11 NOTE — NUR
Problems reprioritized. Patient report given, questions answered & plan of care reviewed 
with Brittani MUJICA. 

-------------------------------------------------------------------------------

Addendum: 09/11/20 at 0631 by Lucía Lewis RN

-------------------------------------------------------------------------------

Amended: Links added.

## 2020-09-11 NOTE — NUR
Reassessment: Pt remains NPO with nutrient needs being met with TPN. NG tube in place 
documented with 350 mL output 9/10. LBM 9/11 documented as moderate and diarrhea following 
previously with no BM since 9/3 and s/p ileostomy reversal 9/4. No changes in nutrition 
intervention at this time. Will continue to follow closely.

Recommendations:

1) Continuous 3:1 Clinimix-E 5/20 2L bag with 100 mL 20% intralipids with

goal rate of 75 mL/hr to provide: 1800 mL total volume/day, 1679 kcal, 86

g AA, 343 g dextrose (dext load 4.64 mg/kg/min), and 17 g lipids (10% of

kcal)

2) Prealbumin and TG q Monday/Thursday

3) Daily weights

4) Advance diet as medically indicated to low-residue

5) Once PO diet advancement; Strawberry Ensure Enlive TID, dislikes

chocolate

6) Honor food preferences once PO: dislikes cream of wheat

7) Bowel care PRN

-------------------------------------------------------------------------------

Addendum: 09/11/20 at 1404 by Mecca Pavon RD

-------------------------------------------------------------------------------

Amended: Links added.

## 2020-09-11 NOTE — NUR
Patient in room HOOD 349. I have received report from GUANAKO Castrejon and had the opportunity to 
ask questions and assume patient care.

## 2020-09-11 NOTE — NUR
Problems reprioritized. Patient report given, questions answered & plan of care reviewed 
with JASMYNE ODONNELL RN.

## 2020-09-12 VITALS — DIASTOLIC BLOOD PRESSURE: 69 MMHG | SYSTOLIC BLOOD PRESSURE: 113 MMHG

## 2020-09-12 VITALS — DIASTOLIC BLOOD PRESSURE: 50 MMHG | SYSTOLIC BLOOD PRESSURE: 127 MMHG

## 2020-09-12 VITALS — DIASTOLIC BLOOD PRESSURE: 79 MMHG | SYSTOLIC BLOOD PRESSURE: 116 MMHG

## 2020-09-12 VITALS — SYSTOLIC BLOOD PRESSURE: 113 MMHG | DIASTOLIC BLOOD PRESSURE: 69 MMHG

## 2020-09-12 VITALS — DIASTOLIC BLOOD PRESSURE: 93 MMHG | SYSTOLIC BLOOD PRESSURE: 139 MMHG

## 2020-09-12 VITALS — DIASTOLIC BLOOD PRESSURE: 68 MMHG | SYSTOLIC BLOOD PRESSURE: 117 MMHG

## 2020-09-12 VITALS — DIASTOLIC BLOOD PRESSURE: 83 MMHG | SYSTOLIC BLOOD PRESSURE: 138 MMHG

## 2020-09-12 LAB
MAGNESIUM SERPL-MCNC: 2.1 MG/DL (ref 1.5–2.4)
POTASSIUM SERPL-SCNC: 3.4 MMOL/L (ref 3.5–5.1)

## 2020-09-12 RX ADMIN — HYDROMORPHONE HYDROCHLORIDE SCH MLS/HR: 10 INJECTION, SOLUTION INTRAMUSCULAR; INTRAVENOUS; SUBCUTANEOUS at 03:00

## 2020-09-12 RX ADMIN — POTASSIUM CHLORIDE PRN MEQ: 1500 TABLET, EXTENDED RELEASE ORAL at 17:22

## 2020-09-12 RX ADMIN — IPRATROPIUM BROMIDE AND ALBUTEROL SULFATE SCH ML: .5; 3 SOLUTION RESPIRATORY (INHALATION) at 02:55

## 2020-09-12 RX ADMIN — IPRATROPIUM BROMIDE AND ALBUTEROL SULFATE SCH ML: .5; 3 SOLUTION RESPIRATORY (INHALATION) at 08:22

## 2020-09-12 RX ADMIN — Medication PRN MG: at 08:51

## 2020-09-12 RX ADMIN — CARVEDILOL SCH MG: 6.25 TABLET, FILM COATED ORAL at 21:07

## 2020-09-12 RX ADMIN — HEPARIN SODIUM SCH UNIT: 5000 INJECTION, SOLUTION INTRAVENOUS; SUBCUTANEOUS at 08:28

## 2020-09-12 RX ADMIN — TAZOBACTAM SODIUM AND PIPERACILLIN SODIUM SCH MLS/HR: 375; 3 INJECTION, SOLUTION INTRAVENOUS at 08:27

## 2020-09-12 RX ADMIN — HYDROMORPHONE HYDROCHLORIDE SCH MLS/HR: 10 INJECTION, SOLUTION INTRAMUSCULAR; INTRAVENOUS; SUBCUTANEOUS at 01:00

## 2020-09-12 RX ADMIN — POTASSIUM CHLORIDE PRN MEQ: 1500 TABLET, EXTENDED RELEASE ORAL at 13:05

## 2020-09-12 RX ADMIN — Medication SCH MG: at 08:27

## 2020-09-12 RX ADMIN — Medication SCH CAN: at 18:00

## 2020-09-12 RX ADMIN — Medication SCH MMU: at 21:07

## 2020-09-12 RX ADMIN — VANCOMYCIN HYDROCHLORIDE SCH MLS/HR: 500 INJECTION, POWDER, LYOPHILIZED, FOR SOLUTION INTRAVENOUS at 04:23

## 2020-09-12 RX ADMIN — Medication SCH MMU: at 08:27

## 2020-09-12 RX ADMIN — Medication SCH TAB: at 08:27

## 2020-09-12 RX ADMIN — Medication SCH CAN: at 08:00

## 2020-09-12 RX ADMIN — CARVEDILOL SCH MG: 6.25 TABLET, FILM COATED ORAL at 08:27

## 2020-09-12 RX ADMIN — HYDROMORPHONE HYDROCHLORIDE SCH MLS/HR: 10 INJECTION, SOLUTION INTRAMUSCULAR; INTRAVENOUS; SUBCUTANEOUS at 07:00

## 2020-09-12 RX ADMIN — TAZOBACTAM SODIUM AND PIPERACILLIN SODIUM SCH MLS/HR: 375; 3 INJECTION, SOLUTION INTRAVENOUS at 00:13

## 2020-09-12 RX ADMIN — IPRATROPIUM BROMIDE AND ALBUTEROL SULFATE SCH ML: .5; 3 SOLUTION RESPIRATORY (INHALATION) at 15:05

## 2020-09-12 RX ADMIN — HYDROMORPHONE HYDROCHLORIDE SCH MLS/HR: 10 INJECTION, SOLUTION INTRAMUSCULAR; INTRAVENOUS; SUBCUTANEOUS at 05:00

## 2020-09-12 RX ADMIN — NYSTATIN AND TRIAMCINOLONE ACETONIDE SCH APPLIC: 100000; 1 CREAM TOPICAL at 08:00

## 2020-09-12 RX ADMIN — IPRATROPIUM BROMIDE AND ALBUTEROL SULFATE SCH ML: .5; 3 SOLUTION RESPIRATORY (INHALATION) at 19:55

## 2020-09-12 RX ADMIN — Medication SCH CAN: at 13:00

## 2020-09-12 NOTE — NUR
Patient in room HOOD 349. I have received report from TAMRA MUJICA  and had the opportunity to 
ask questions and assume patient care.

## 2020-09-12 NOTE — NUR
Problems reprioritized. Patient report given, questions answered & plan of care reviewed 
with GUANAKO Aguila.

## 2020-09-12 NOTE — NUR
Problems reprioritized. Patient report given, questions answered & plan of care reviewed 
with GUANAKO Jimenez.

## 2020-09-12 NOTE — NUR
noted with some dark brown blood on patient while trying to have a BM, already had an 
episode during the day and was seen by the hospitalist

## 2020-09-12 NOTE — NUR
Patient in room HOOD 349. I have received report from Aspen BACA RN and had the opportunity to 
ask questions and assume patient care.

## 2020-09-12 NOTE — NUR
Patient in room PCU 3023. I have received report from  GUANAKO Aguila  and had the opportunity 
to ask questions and assume patient care.

## 2020-09-13 VITALS — SYSTOLIC BLOOD PRESSURE: 106 MMHG | DIASTOLIC BLOOD PRESSURE: 54 MMHG

## 2020-09-13 VITALS — DIASTOLIC BLOOD PRESSURE: 59 MMHG | SYSTOLIC BLOOD PRESSURE: 92 MMHG

## 2020-09-13 VITALS — DIASTOLIC BLOOD PRESSURE: 35 MMHG | SYSTOLIC BLOOD PRESSURE: 90 MMHG

## 2020-09-13 VITALS — SYSTOLIC BLOOD PRESSURE: 100 MMHG | DIASTOLIC BLOOD PRESSURE: 30 MMHG

## 2020-09-13 VITALS — DIASTOLIC BLOOD PRESSURE: 62 MMHG | SYSTOLIC BLOOD PRESSURE: 114 MMHG

## 2020-09-13 VITALS — SYSTOLIC BLOOD PRESSURE: 144 MMHG | DIASTOLIC BLOOD PRESSURE: 68 MMHG

## 2020-09-13 VITALS — SYSTOLIC BLOOD PRESSURE: 111 MMHG | DIASTOLIC BLOOD PRESSURE: 39 MMHG

## 2020-09-13 LAB
ALBUMIN SERPL BCP-MCNC: 2 G/DL (ref 3.4–5)
ALBUMIN/GLOB SERPL: 0.5 {RATIO} (ref 1.1–1.5)
ALP SERPL-CCNC: 282 IU/L (ref 46–116)
ALT SERPL W P-5'-P-CCNC: 31 U/L (ref 12–78)
ANION GAP SERPL CALCULATED.3IONS-SCNC: 8 MMOL/L (ref 8–16)
AST SERPL W P-5'-P-CCNC: 44 U/L (ref 10–37)
BASE EXCESS BLDA CALC-SCNC: -10.9 MMOL/L (ref -2–2)
BASOPHILS # BLD AUTO: 0.1 X10'3 (ref 0–0.2)
BASOPHILS NFR BLD AUTO: 0.5 % (ref 0–1)
BILIRUB SERPL-MCNC: 0.8 MG/DL (ref 0.1–1)
BODY TEMPERATURE: 37
BUN SERPL-MCNC: 19 MG/DL (ref 7–18)
BUN/CREAT SERPL: 14.1 (ref 5.4–32)
CALCIUM SERPL-MCNC: 9.1 MG/DL (ref 8.5–10.1)
CHLORIDE SERPL-SCNC: 106 MMOL/L (ref 99–107)
CO2 SERPL-SCNC: 28.2 MMOL/L (ref 24–32)
COHGB MFR BLDA: 0.6 % (ref 0–3.9)
CREAT SERPL-MCNC: 1.35 MG/DL (ref 0.6–1.1)
EOSINOPHIL # BLD AUTO: 0.1 X10'3 (ref 0–0.9)
EOSINOPHIL NFR BLD AUTO: 1 % (ref 0–6)
ERYTHROCYTE [DISTWIDTH] IN BLOOD BY AUTOMATED COUNT: 16.9 % (ref 11.5–14.5)
GFR SERPL CREATININE-BSD FRML MDRD: 54 ML/MIN
GLUCOSE SERPL-MCNC: 96 MG/DL (ref 70–104)
HCO3 BLDA-SCNC: 14.1 MMOL/L (ref 22–26)
HCT VFR BLD AUTO: 30.4 % (ref 42–52)
HGB BLD-MCNC: 10.3 G/DL (ref 14–17.9)
HGB BLDA-MCNC: 8.7 G/DL (ref 14–18)
LYMPHOCYTES # BLD AUTO: 1.7 X10'3 (ref 1.1–4.8)
LYMPHOCYTES NFR BLD AUTO: 15.6 % (ref 21–51)
MCH RBC QN AUTO: 31.2 PG (ref 27–31)
MCHC RBC AUTO-ENTMCNC: 33.9 G/DL (ref 33–36.5)
MCV RBC AUTO: 92.2 FL (ref 78–98)
METHGB MFR BLDA: 0.2 % (ref 0–1.5)
MONOCYTES # BLD AUTO: 1.6 X10'3 (ref 0–0.9)
MONOCYTES NFR BLD AUTO: 14.8 % (ref 2–12)
NEUTROPHILS # BLD AUTO: 7.2 X10'3 (ref 1.8–7.7)
NEUTROPHILS NFR BLD AUTO: 68.1 % (ref 42–75)
O2/TOTAL GAS SETTING VFR VENT: 35 MMHG/%
OXYHGB MFR BLDA: 91.4 % (ref 94–97)
PCO2 TEMP ADJ BLDA: 28.2 MMHG (ref 35–48)
PLATELET # BLD AUTO: 376 X10'3 (ref 140–440)
PMV BLD AUTO: 8.7 FL (ref 7.4–10.4)
PO2 TEMP ADJ BLD: 71.8 MMHG (ref 75–100)
POTASSIUM SERPL-SCNC: 3.4 MMOL/L (ref 3.5–5.1)
PROT SERPL-MCNC: 5.9 G/DL (ref 6.4–8.2)
RBC # BLD AUTO: 3.3 X10'6 (ref 4.7–6.1)
RESP RATE 1H: 121 B/MIN
SAO2 % BLDA: 92.1 % (ref 94–97)
SODIUM SERPL-SCNC: 142 MMOL/L (ref 135–145)
WBC # BLD AUTO: 10.6 X10'3 (ref 4.5–11)

## 2020-09-13 RX ADMIN — POTASSIUM CHLORIDE PRN MEQ: 1500 TABLET, EXTENDED RELEASE ORAL at 13:36

## 2020-09-13 RX ADMIN — AMOXICILLIN AND CLAVULANATE POTASSIUM SCH TAB: 875; 125 TABLET, FILM COATED ORAL at 17:37

## 2020-09-13 RX ADMIN — Medication SCH MMU: at 21:01

## 2020-09-13 RX ADMIN — POTASSIUM CHLORIDE PRN MEQ: 1500 TABLET, EXTENDED RELEASE ORAL at 17:37

## 2020-09-13 RX ADMIN — IPRATROPIUM BROMIDE AND ALBUTEROL SULFATE SCH ML: .5; 3 SOLUTION RESPIRATORY (INHALATION) at 20:34

## 2020-09-13 RX ADMIN — IPRATROPIUM BROMIDE AND ALBUTEROL SULFATE SCH ML: .5; 3 SOLUTION RESPIRATORY (INHALATION) at 14:57

## 2020-09-13 RX ADMIN — Medication SCH MMU: at 08:21

## 2020-09-13 RX ADMIN — CARVEDILOL SCH MG: 6.25 TABLET, FILM COATED ORAL at 20:00

## 2020-09-13 RX ADMIN — AMOXICILLIN AND CLAVULANATE POTASSIUM SCH TAB: 875; 125 TABLET, FILM COATED ORAL at 08:40

## 2020-09-13 RX ADMIN — Medication SCH CAN: at 08:00

## 2020-09-13 RX ADMIN — PANTOPRAZOLE SODIUM SCH MG: 40 TABLET, DELAYED RELEASE ORAL at 10:01

## 2020-09-13 RX ADMIN — Medication SCH TAB: at 08:19

## 2020-09-13 RX ADMIN — PANTOPRAZOLE SODIUM SCH MG: 40 TABLET, DELAYED RELEASE ORAL at 08:41

## 2020-09-13 RX ADMIN — Medication SCH MG: at 08:21

## 2020-09-13 RX ADMIN — CARVEDILOL SCH MG: 6.25 TABLET, FILM COATED ORAL at 08:21

## 2020-09-13 RX ADMIN — IPRATROPIUM BROMIDE AND ALBUTEROL SULFATE SCH ML: .5; 3 SOLUTION RESPIRATORY (INHALATION) at 03:56

## 2020-09-13 RX ADMIN — AMOXICILLIN AND CLAVULANATE POTASSIUM SCH TAB: 875; 125 TABLET, FILM COATED ORAL at 10:01

## 2020-09-13 RX ADMIN — Medication SCH CAN: at 13:00

## 2020-09-13 RX ADMIN — ONDANSETRON PRN MG: 4 TABLET, ORALLY DISINTEGRATING ORAL at 10:01

## 2020-09-13 RX ADMIN — Medication SCH CAN: at 18:24

## 2020-09-13 RX ADMIN — IPRATROPIUM BROMIDE AND ALBUTEROL SULFATE SCH ML: .5; 3 SOLUTION RESPIRATORY (INHALATION) at 07:48

## 2020-09-13 RX ADMIN — ONDANSETRON PRN MG: 4 TABLET, ORALLY DISINTEGRATING ORAL at 08:40

## 2020-09-13 RX ADMIN — POTASSIUM CHLORIDE PRN MEQ: 1500 TABLET, EXTENDED RELEASE ORAL at 08:40

## 2020-09-13 NOTE — NUR
Report received from Ciera MUJICA.  Introduced self to patient. Patient alert, oriented x 4, not 
in distress and comfortable at this time.

## 2020-09-13 NOTE — NUR
F/u (9/13): Pt advanced to regular diet and TPN now off. PO ~25% first PO 

meals since TPN started though is requesting strawberry ensure enlives w/ 

meals since PO once more. Dietary notified. LBM 9/13 copious per EMR 

though small per MD note. Will continue to monitor for additional 

protein/kcal needs post-op.

Recommendations:

1) Advance diet as medically indicated to low-residue

2) Strawberry Ensure Enlive TID, dislikes chocolate

3) Honor food preferences once PO: dislikes cream of wheat

4) Bowel care per rx; if copious diarrhea consider anti-diarrheal post-op

5) weekly wts

-------------------------------------------------------------------------------

Addendum: 09/13/20 at 1452 by Yemi Rowley RD

-------------------------------------------------------------------------------

Amended: Links added.

## 2020-09-13 NOTE — NUR
Patient in room PCU 3021. I have received report from  GUANAKO Alberto  and had the opportunity to 
ask questions and assume patient care.

## 2020-09-13 NOTE — NUR
Problems reprioritized. Patient report given, questions answered & plan of care reviewed 
with Aspen ZAMORANO RN.

## 2020-09-13 NOTE — NUR
PAGER ID:  [phone] 

MESSAGE:  Patient Bal Dunne1. Pt has no IV, can I switch to PO for his Protonix? Thanks, 
Ciera VIDA [phone]

## 2020-09-13 NOTE — NUR
Patient in room PCU 3021. I have received report from  GUANAKO Vang  and had the opportunity to 
ask questions and assume patient care.

## 2020-09-13 NOTE — NUR
Patient in room PCU 3021. I have received report from GUANAKO Jimenez   and had the opportunity to 
ask questions and assume patient care.

## 2020-09-13 NOTE — NUR
Sent to [employer] Mobile

MESSAGE:  room 3020 Emmanuel Selby: Can you please call me regarding this patient. 
Extensive hx, Thank you, Barbara [phone]

## 2020-09-14 VITALS — DIASTOLIC BLOOD PRESSURE: 57 MMHG | SYSTOLIC BLOOD PRESSURE: 159 MMHG

## 2020-09-14 VITALS — SYSTOLIC BLOOD PRESSURE: 120 MMHG | DIASTOLIC BLOOD PRESSURE: 69 MMHG

## 2020-09-14 VITALS — DIASTOLIC BLOOD PRESSURE: 77 MMHG | SYSTOLIC BLOOD PRESSURE: 131 MMHG

## 2020-09-14 VITALS — DIASTOLIC BLOOD PRESSURE: 41 MMHG | SYSTOLIC BLOOD PRESSURE: 153 MMHG

## 2020-09-14 VITALS — SYSTOLIC BLOOD PRESSURE: 121 MMHG | DIASTOLIC BLOOD PRESSURE: 73 MMHG

## 2020-09-14 LAB
ALBUMIN SERPL BCP-MCNC: 1.9 G/DL (ref 3.4–5)
ALBUMIN/GLOB SERPL: 0.5 {RATIO} (ref 1.1–1.5)
ALP SERPL-CCNC: 228 IU/L (ref 46–116)
ALT SERPL W P-5'-P-CCNC: 39 U/L (ref 12–78)
ANION GAP SERPL CALCULATED.3IONS-SCNC: 7 MMOL/L (ref 8–16)
AST SERPL W P-5'-P-CCNC: 46 U/L (ref 10–37)
BASOPHILS # BLD AUTO: 0 X10'3 (ref 0–0.2)
BASOPHILS NFR BLD AUTO: 0.3 % (ref 0–1)
BILIRUB SERPL-MCNC: 0.4 MG/DL (ref 0.1–1)
BUN SERPL-MCNC: 15 MG/DL (ref 7–18)
BUN/CREAT SERPL: 11.1 (ref 5.4–32)
CALCIUM SERPL-MCNC: 8.4 MG/DL (ref 8.5–10.1)
CHLORIDE SERPL-SCNC: 107 MMOL/L (ref 99–107)
CO2 SERPL-SCNC: 25.8 MMOL/L (ref 24–32)
CREAT SERPL-MCNC: 1.35 MG/DL (ref 0.6–1.1)
EOSINOPHIL # BLD AUTO: 0.2 X10'3 (ref 0–0.9)
EOSINOPHIL NFR BLD AUTO: 2 % (ref 0–6)
ERYTHROCYTE [DISTWIDTH] IN BLOOD BY AUTOMATED COUNT: 16.7 % (ref 11.5–14.5)
GFR SERPL CREATININE-BSD FRML MDRD: 54 ML/MIN
GLUCOSE SERPL-MCNC: 113 MG/DL (ref 70–104)
HCT VFR BLD AUTO: 29.3 % (ref 42–52)
HGB BLD-MCNC: 9.6 G/DL (ref 14–17.9)
LYMPHOCYTES # BLD AUTO: 1.3 X10'3 (ref 1.1–4.8)
LYMPHOCYTES NFR BLD AUTO: 15.3 % (ref 21–51)
MCH RBC QN AUTO: 29.9 PG (ref 27–31)
MCHC RBC AUTO-ENTMCNC: 32.8 G/DL (ref 33–36.5)
MCV RBC AUTO: 91.2 FL (ref 78–98)
MONOCYTES # BLD AUTO: 1.2 X10'3 (ref 0–0.9)
MONOCYTES NFR BLD AUTO: 13.5 % (ref 2–12)
NEUTROPHILS # BLD AUTO: 6 X10'3 (ref 1.8–7.7)
NEUTROPHILS NFR BLD AUTO: 68.9 % (ref 42–75)
PLATELET # BLD AUTO: 482 X10'3 (ref 140–440)
PMV BLD AUTO: 8.7 FL (ref 7.4–10.4)
POTASSIUM SERPL-SCNC: 3.4 MMOL/L (ref 3.5–5.1)
PROT SERPL-MCNC: 5.5 G/DL (ref 6.4–8.2)
RBC # BLD AUTO: 3.22 X10'6 (ref 4.7–6.1)
SODIUM SERPL-SCNC: 140 MMOL/L (ref 135–145)
WBC # BLD AUTO: 8.8 X10'3 (ref 4.5–11)

## 2020-09-14 RX ADMIN — Medication SCH CAN: at 18:00

## 2020-09-14 RX ADMIN — AMOXICILLIN AND CLAVULANATE POTASSIUM SCH TAB: 875; 125 TABLET, FILM COATED ORAL at 16:53

## 2020-09-14 RX ADMIN — IPRATROPIUM BROMIDE AND ALBUTEROL SULFATE SCH ML: .5; 3 SOLUTION RESPIRATORY (INHALATION) at 20:22

## 2020-09-14 RX ADMIN — Medication SCH MMU: at 08:34

## 2020-09-14 RX ADMIN — CARVEDILOL SCH MG: 6.25 TABLET, FILM COATED ORAL at 21:02

## 2020-09-14 RX ADMIN — AMOXICILLIN AND CLAVULANATE POTASSIUM SCH TAB: 875; 125 TABLET, FILM COATED ORAL at 08:33

## 2020-09-14 RX ADMIN — Medication SCH MMU: at 21:02

## 2020-09-14 RX ADMIN — IPRATROPIUM BROMIDE AND ALBUTEROL SULFATE SCH ML: .5; 3 SOLUTION RESPIRATORY (INHALATION) at 09:00

## 2020-09-14 RX ADMIN — POTASSIUM CHLORIDE PRN MEQ: 1500 TABLET, EXTENDED RELEASE ORAL at 16:53

## 2020-09-14 RX ADMIN — Medication SCH TAB: at 08:34

## 2020-09-14 RX ADMIN — Medication SCH CAN: at 13:37

## 2020-09-14 RX ADMIN — SODIUM CHLORIDE SCH MLS/HR: 9 INJECTION INTRAMUSCULAR; INTRAVENOUS; SUBCUTANEOUS at 16:53

## 2020-09-14 RX ADMIN — IPRATROPIUM BROMIDE AND ALBUTEROL SULFATE SCH ML: .5; 3 SOLUTION RESPIRATORY (INHALATION) at 16:01

## 2020-09-14 RX ADMIN — IPRATROPIUM BROMIDE AND ALBUTEROL SULFATE SCH ML: .5; 3 SOLUTION RESPIRATORY (INHALATION) at 03:12

## 2020-09-14 RX ADMIN — Medication SCH MG: at 08:33

## 2020-09-14 RX ADMIN — Medication SCH CAN: at 08:35

## 2020-09-14 RX ADMIN — POTASSIUM CHLORIDE PRN MEQ: 1500 TABLET, EXTENDED RELEASE ORAL at 08:34

## 2020-09-14 RX ADMIN — POTASSIUM CHLORIDE PRN MEQ: 1500 TABLET, EXTENDED RELEASE ORAL at 21:02

## 2020-09-14 RX ADMIN — PANTOPRAZOLE SODIUM SCH MG: 40 TABLET, DELAYED RELEASE ORAL at 08:33

## 2020-09-14 RX ADMIN — CARVEDILOL SCH MG: 6.25 TABLET, FILM COATED ORAL at 08:34

## 2020-09-14 NOTE — NUR
PAGER ID:  [phone] 

MESSAGE:  Called x ray for Bal Emmanuel- they said short staffed, long procedure, long 
line ahead of pt. cant be done today. Will be done first thing in AM. Pt. only needs to be 
NPO from 2400 on. Clear liquid diet? Chiqui [phone]



If MD has anymore questions xray stated to have him called MD Neville @ [phone]

## 2020-09-14 NOTE — NUR
c/o severe pain; declining Norco, wants something quicker acting, MD paged, order received 
for one time Morphine 2mg IM.

## 2020-09-14 NOTE — NUR
Problems reprioritized. Patient report given, questions answered & plan of care reviewed 
with GUANAKO Florian.

## 2020-09-14 NOTE — NUR
pt denies DVT tenderness; no redness or swelling noted

-------------------------------------------------------------------------------

Addendum: 09/15/20 at 0338 by Ginger Arguello RN

-------------------------------------------------------------------------------

Amended: Links added.

## 2020-09-14 NOTE — NUR
PAGER ID:  [phone] 

MESSAGE:  Emmanuel Antunez [id number] liquid stool incontinent, board like stomach, distended, 
bladder scan =17ml in bladder. Pt. needs more pain medication for breakthrough pain. NO IV. 
Wheezes bilat. anterior lung lobes. [phone]

## 2020-09-15 VITALS — SYSTOLIC BLOOD PRESSURE: 128 MMHG | DIASTOLIC BLOOD PRESSURE: 80 MMHG

## 2020-09-15 VITALS — DIASTOLIC BLOOD PRESSURE: 72 MMHG | SYSTOLIC BLOOD PRESSURE: 150 MMHG

## 2020-09-15 VITALS — DIASTOLIC BLOOD PRESSURE: 70 MMHG | SYSTOLIC BLOOD PRESSURE: 147 MMHG

## 2020-09-15 VITALS — SYSTOLIC BLOOD PRESSURE: 129 MMHG | DIASTOLIC BLOOD PRESSURE: 74 MMHG

## 2020-09-15 LAB
ALBUMIN SERPL BCP-MCNC: 1.8 G/DL (ref 3.4–5)
ALBUMIN/GLOB SERPL: 0.5 {RATIO} (ref 1.1–1.5)
ALP SERPL-CCNC: 204 IU/L (ref 46–116)
ALT SERPL W P-5'-P-CCNC: 64 U/L (ref 12–78)
ANION GAP SERPL CALCULATED.3IONS-SCNC: 8 MMOL/L (ref 8–16)
AST SERPL W P-5'-P-CCNC: 59 U/L (ref 10–37)
BASOPHILS # BLD AUTO: 0 X10'3 (ref 0–0.2)
BASOPHILS NFR BLD AUTO: 0.4 % (ref 0–1)
BILIRUB SERPL-MCNC: 0.4 MG/DL (ref 0.1–1)
BUN SERPL-MCNC: 14 MG/DL (ref 7–18)
BUN/CREAT SERPL: 12.1 (ref 5.4–32)
CALCIUM SERPL-MCNC: 7.4 MG/DL (ref 8.5–10.1)
CHLORIDE SERPL-SCNC: 109 MMOL/L (ref 99–107)
CO2 SERPL-SCNC: 24.4 MMOL/L (ref 24–32)
CREAT SERPL-MCNC: 1.16 MG/DL (ref 0.6–1.1)
EOSINOPHIL # BLD AUTO: 0.1 X10'3 (ref 0–0.9)
EOSINOPHIL NFR BLD AUTO: 1.5 % (ref 0–6)
ERYTHROCYTE [DISTWIDTH] IN BLOOD BY AUTOMATED COUNT: 16.3 % (ref 11.5–14.5)
GFR SERPL CREATININE-BSD FRML MDRD: 65 ML/MIN
GLUCOSE SERPL-MCNC: 97 MG/DL (ref 70–104)
HCT VFR BLD AUTO: 28.7 % (ref 42–52)
HGB BLD-MCNC: 9.6 G/DL (ref 14–17.9)
LYMPHOCYTES # BLD AUTO: 2.1 X10'3 (ref 1.1–4.8)
LYMPHOCYTES NFR BLD AUTO: 21.2 % (ref 21–51)
MAGNESIUM SERPL-MCNC: 1.4 MG/DL (ref 1.5–2.4)
MCH RBC QN AUTO: 30.7 PG (ref 27–31)
MCHC RBC AUTO-ENTMCNC: 33.4 G/DL (ref 33–36.5)
MCV RBC AUTO: 92 FL (ref 78–98)
MONOCYTES # BLD AUTO: 1.4 X10'3 (ref 0–0.9)
MONOCYTES NFR BLD AUTO: 13.9 % (ref 2–12)
NEUTROPHILS # BLD AUTO: 6.2 X10'3 (ref 1.8–7.7)
NEUTROPHILS NFR BLD AUTO: 63 % (ref 42–75)
PLATELET # BLD AUTO: 569 X10'3 (ref 140–440)
PMV BLD AUTO: 8.5 FL (ref 7.4–10.4)
POTASSIUM SERPL-SCNC: 3.4 MMOL/L (ref 3.5–5.1)
PROT SERPL-MCNC: 5.2 G/DL (ref 6.4–8.2)
RBC # BLD AUTO: 3.12 X10'6 (ref 4.7–6.1)
SODIUM SERPL-SCNC: 141 MMOL/L (ref 135–145)
WBC # BLD AUTO: 9.9 X10'3 (ref 4.5–11)

## 2020-09-15 RX ADMIN — Medication SCH MMU: at 20:00

## 2020-09-15 RX ADMIN — IPRATROPIUM BROMIDE AND ALBUTEROL SULFATE SCH ML: .5; 3 SOLUTION RESPIRATORY (INHALATION) at 20:55

## 2020-09-15 RX ADMIN — Medication SCH MMU: at 08:00

## 2020-09-15 RX ADMIN — IPRATROPIUM BROMIDE AND ALBUTEROL SULFATE SCH ML: .5; 3 SOLUTION RESPIRATORY (INHALATION) at 13:35

## 2020-09-15 RX ADMIN — POTASSIUM CHLORIDE PRN MLS/HR: 7.46 INJECTION, SOLUTION INTRAVENOUS at 15:18

## 2020-09-15 RX ADMIN — CARVEDILOL SCH MG: 6.25 TABLET, FILM COATED ORAL at 22:23

## 2020-09-15 RX ADMIN — IPRATROPIUM BROMIDE AND ALBUTEROL SULFATE SCH ML: .5; 3 SOLUTION RESPIRATORY (INHALATION) at 08:54

## 2020-09-15 RX ADMIN — Medication SCH CAN: at 08:00

## 2020-09-15 RX ADMIN — Medication SCH CAN: at 17:20

## 2020-09-15 RX ADMIN — SODIUM CHLORIDE SCH MLS/HR: 9 INJECTION INTRAMUSCULAR; INTRAVENOUS; SUBCUTANEOUS at 02:26

## 2020-09-15 RX ADMIN — POTASSIUM CHLORIDE PRN MLS/HR: 7.46 INJECTION, SOLUTION INTRAVENOUS at 22:13

## 2020-09-15 RX ADMIN — Medication SCH MG: at 08:00

## 2020-09-15 RX ADMIN — SODIUM CHLORIDE SCH MLS/HR: 9 INJECTION INTRAMUSCULAR; INTRAVENOUS; SUBCUTANEOUS at 12:41

## 2020-09-15 RX ADMIN — PANTOPRAZOLE SODIUM SCH MG: 40 TABLET, DELAYED RELEASE ORAL at 07:30

## 2020-09-15 RX ADMIN — SODIUM CHLORIDE SCH MLS/HR: 9 INJECTION INTRAMUSCULAR; INTRAVENOUS; SUBCUTANEOUS at 16:04

## 2020-09-15 RX ADMIN — AMOXICILLIN AND CLAVULANATE POTASSIUM SCH TAB: 875; 125 TABLET, FILM COATED ORAL at 16:12

## 2020-09-15 RX ADMIN — IPRATROPIUM BROMIDE AND ALBUTEROL SULFATE SCH ML: .5; 3 SOLUTION RESPIRATORY (INHALATION) at 02:55

## 2020-09-15 RX ADMIN — Medication SCH CAN: at 13:00

## 2020-09-15 RX ADMIN — CARVEDILOL SCH MG: 6.25 TABLET, FILM COATED ORAL at 08:00

## 2020-09-15 RX ADMIN — AMOXICILLIN AND CLAVULANATE POTASSIUM SCH TAB: 875; 125 TABLET, FILM COATED ORAL at 08:27

## 2020-09-15 RX ADMIN — Medication SCH TAB: at 08:00

## 2020-09-15 NOTE — NUR
Student documentation:

I have reviewed and agree with all interventions, assessments performed and documented by 
GUANAKO Hylton.



Student Medication Administration:

For this medication-pass time frame, all medication were reviewed, dispensed, administered 
and documented per hospital policy by SN Concetta.

## 2020-09-15 NOTE — NUR
Patient in room HOOD 360. I have received report from Petrona Armstrong  and had the opportunity to 
ask questions and assume patient care.

## 2020-09-15 NOTE — NUR
Problems reprioritized. Patient report given, questions answered & plan of care reviewed 
with Marium Armstrong.

## 2020-09-15 NOTE — NUR
Patient in room HOOD 360. I have received report from Cecilia Armstrong   and had the opportunity to 
ask questions and assume patient care.

## 2020-09-15 NOTE — NUR
patient taken to CT scan. all belongings transferred to 360B. CT tech will take patient to 
surgical room.

## 2020-09-15 NOTE — NUR
PAGER ID:  [phone] 

MESSAGE:  ZELALEM SHORT 6474 : GI IS NOT AWARE OF THIS PATIENTS UPPER GI F/T ORDER. SAID 
HOSPITALIST NEEDS TO CALL GI  THANK YOU, MARGARITO [phone]

## 2020-09-15 NOTE — NUR
Patient in room HOOD 360. I have received report from Cecilia MUJICA   and had the opportunity to 
ask questions and assume patient care.

## 2020-09-15 NOTE — NUR
received pt to surgical, connected his NG tube to low intermittent  suction and put his IV 
fluids on. Pt  A & O X 4 in no apparent distress.

## 2020-09-15 NOTE — NUR
Patient in room PCU 3021. I have received report from  Kyra pretty and had the 
opportunity to ask questions and assume patient care.

## 2020-09-15 NOTE — NUR
promotional table spacer 



PAGER ID:  [phone] 

MESSAGE:  ZELALEM Selby [phone]: radiology just called patient is positive for obstruction and 
follow through study is complete. please call radiologist with any questions. thanks! alfred 
[phone]

## 2020-09-16 VITALS — SYSTOLIC BLOOD PRESSURE: 124 MMHG | DIASTOLIC BLOOD PRESSURE: 68 MMHG

## 2020-09-16 VITALS — SYSTOLIC BLOOD PRESSURE: 121 MMHG | DIASTOLIC BLOOD PRESSURE: 71 MMHG

## 2020-09-16 VITALS — SYSTOLIC BLOOD PRESSURE: 134 MMHG | DIASTOLIC BLOOD PRESSURE: 72 MMHG

## 2020-09-16 VITALS — SYSTOLIC BLOOD PRESSURE: 156 MMHG | DIASTOLIC BLOOD PRESSURE: 77 MMHG

## 2020-09-16 LAB
ALBUMIN SERPL BCP-MCNC: 2 G/DL (ref 3.4–5)
ALBUMIN/GLOB SERPL: 0.6 {RATIO} (ref 1.1–1.5)
ALP SERPL-CCNC: 197 IU/L (ref 46–116)
ALT SERPL W P-5'-P-CCNC: 59 U/L (ref 12–78)
ANION GAP SERPL CALCULATED.3IONS-SCNC: 12 MMOL/L (ref 8–16)
ANISOCYTOSIS BLD QL SMEAR: (no result)
AST SERPL W P-5'-P-CCNC: 50 U/L (ref 10–37)
BASOPHILS # BLD AUTO: 0 X10'3 (ref 0–0.2)
BASOPHILS NFR BLD AUTO: 0.5 % (ref 0–1)
BILIRUB SERPL-MCNC: 0.4 MG/DL (ref 0.1–1)
BUN SERPL-MCNC: 13 MG/DL (ref 7–18)
BUN/CREAT SERPL: 11.7 (ref 5.4–32)
BURR CELLS BLD QL SMEAR: (no result)
CALCIUM SERPL-MCNC: 7.9 MG/DL (ref 8.5–10.1)
CHLORIDE SERPL-SCNC: 106 MMOL/L (ref 99–107)
CO2 SERPL-SCNC: 21.3 MMOL/L (ref 24–32)
CREAT SERPL-MCNC: 1.11 MG/DL (ref 0.6–1.1)
EOSINOPHIL # BLD AUTO: 0.2 X10'3 (ref 0–0.9)
EOSINOPHIL NFR BLD AUTO: 2.3 % (ref 0–6)
ERYTHROCYTE [DISTWIDTH] IN BLOOD BY AUTOMATED COUNT: 16.8 % (ref 11.5–14.5)
GFR SERPL CREATININE-BSD FRML MDRD: 68 ML/MIN
GIANT PLATELETS BLD QL SMEAR: (no result)
GLUCOSE SERPL-MCNC: 67 MG/DL (ref 70–104)
HCT VFR BLD AUTO: 29.2 % (ref 42–52)
HGB BLD-MCNC: 9.9 G/DL (ref 14–17.9)
LG PLATELETS BLD QL SMEAR: (no result)
LYMPHOCYTES # BLD AUTO: 2.4 X10'3 (ref 1.1–4.8)
LYMPHOCYTES NFR BLD AUTO: 23.4 % (ref 21–51)
MAGNESIUM SERPL-MCNC: 1.3 MG/DL (ref 1.5–2.4)
MCH RBC QN AUTO: 31.2 PG (ref 27–31)
MCHC RBC AUTO-ENTMCNC: 33.8 G/DL (ref 33–36.5)
MCV RBC AUTO: 92.4 FL (ref 78–98)
MONOCYTES # BLD AUTO: 1.2 X10'3 (ref 0–0.9)
MONOCYTES NFR BLD AUTO: 12.2 % (ref 2–12)
NEUTROPHILS # BLD AUTO: 6.3 X10'3 (ref 1.8–7.7)
NEUTROPHILS NFR BLD AUTO: 61.6 % (ref 42–75)
PLATELET # BLD AUTO: 636 X10'3 (ref 140–440)
PLATELET BLD QL SMEAR: (no result)
PMV BLD AUTO: 8.3 FL (ref 7.4–10.4)
POIKILOCYTOSIS BLD QL SMEAR: (no result)
POTASSIUM SERPL-SCNC: 4.3 MMOL/L (ref 3.5–5.1)
PROT SERPL-MCNC: 5.5 G/DL (ref 6.4–8.2)
RBC # BLD AUTO: 3.16 X10'6 (ref 4.7–6.1)
RBC MORPH BLD: (no result)
SCHISTOCYTES BLD QL SMEAR: (no result)
SODIUM SERPL-SCNC: 139 MMOL/L (ref 135–145)
WBC # BLD AUTO: 10.2 X10'3 (ref 4.5–11)

## 2020-09-16 RX ADMIN — POTASSIUM CHLORIDE PRN MLS/HR: 7.46 INJECTION, SOLUTION INTRAVENOUS at 01:18

## 2020-09-16 RX ADMIN — Medication SCH CAN: at 12:57

## 2020-09-16 RX ADMIN — AMOXICILLIN AND CLAVULANATE POTASSIUM SCH TAB: 875; 125 TABLET, FILM COATED ORAL at 09:31

## 2020-09-16 RX ADMIN — SODIUM CHLORIDE SCH MLS/HR: 9 INJECTION INTRAMUSCULAR; INTRAVENOUS; SUBCUTANEOUS at 05:33

## 2020-09-16 RX ADMIN — PANTOPRAZOLE SODIUM SCH MG: 40 TABLET, DELAYED RELEASE ORAL at 09:43

## 2020-09-16 RX ADMIN — IPRATROPIUM BROMIDE AND ALBUTEROL SULFATE SCH ML: .5; 3 SOLUTION RESPIRATORY (INHALATION) at 20:11

## 2020-09-16 RX ADMIN — Medication SCH MMU: at 19:43

## 2020-09-16 RX ADMIN — SODIUM CHLORIDE SCH MLS/HR: 9 INJECTION INTRAMUSCULAR; INTRAVENOUS; SUBCUTANEOUS at 16:46

## 2020-09-16 RX ADMIN — POTASSIUM CHLORIDE PRN MLS/HR: 7.46 INJECTION, SOLUTION INTRAVENOUS at 00:20

## 2020-09-16 RX ADMIN — Medication PRN MG: at 09:31

## 2020-09-16 RX ADMIN — IPRATROPIUM BROMIDE AND ALBUTEROL SULFATE SCH ML: .5; 3 SOLUTION RESPIRATORY (INHALATION) at 02:45

## 2020-09-16 RX ADMIN — CARVEDILOL SCH MG: 6.25 TABLET, FILM COATED ORAL at 19:43

## 2020-09-16 RX ADMIN — Medication SCH MMU: at 09:31

## 2020-09-16 RX ADMIN — AMOXICILLIN AND CLAVULANATE POTASSIUM SCH TAB: 875; 125 TABLET, FILM COATED ORAL at 16:45

## 2020-09-16 RX ADMIN — Medication SCH CAN: at 08:00

## 2020-09-16 RX ADMIN — Medication SCH CAN: at 18:00

## 2020-09-16 RX ADMIN — CARVEDILOL SCH MG: 6.25 TABLET, FILM COATED ORAL at 09:32

## 2020-09-16 RX ADMIN — IPRATROPIUM BROMIDE AND ALBUTEROL SULFATE SCH ML: .5; 3 SOLUTION RESPIRATORY (INHALATION) at 09:39

## 2020-09-16 RX ADMIN — Medication SCH TAB: at 09:43

## 2020-09-16 RX ADMIN — IPRATROPIUM BROMIDE AND ALBUTEROL SULFATE SCH ML: .5; 3 SOLUTION RESPIRATORY (INHALATION) at 15:31

## 2020-09-16 NOTE — NUR
Reassessment: Pt previously with average 50-75% PO intake up to 100% PO 

intake since dinner 9/13 with 100% PO intake of ONS. Patient's diet was 

changed to NPO following KUB which showed dilated bowel loops concerning 

for obstruction per physician notes. Pt with surgery tentatively scheduled 

for tomorrow d/t persistent stricture of the anastomosis noted on f/u CT 

scan per physician notes. PO diet has been advanced to clear liquid, 

pending documentation of PO intake. LBM 9/15, documented with moderate 

stool. Will continue to follow closely.

Recommendations:

1) Advance diet as medically indicated to low-residue

2) Once diet is advanced resume strawberry Ensure Enlive TID, dislikes 

chocolate

3) Honor food preferences once PO: dislikes cream of wheat

4) Bowel care per rx; if copious diarrhea consider anti-diarrheal post-op

5) weekly scaled weights

-------------------------------------------------------------------------------

Addendum: 09/16/20 at 1450 by Mecca Pavon RD

-------------------------------------------------------------------------------

Amended: Links added.

## 2020-09-16 NOTE — NUR
Problems reprioritized. Patient report given, questions answered & plan of care reviewed 
with Kyra MUJICA.

## 2020-09-16 NOTE — NUR
I have received report from GUANAKO Rae   and had the opportunity to ask questions and 
assume patient care.

## 2020-09-16 NOTE — NUR
PAGER ID:  [phone] 

MESSAGE:  160D ZELALEM Selby: patients Eliquis is on hold. would you like me to hold Plavix and 
aspirin as well? alfred [phone]

## 2020-09-16 NOTE — NUR
Problems reprioritized. Patient report given, questions answered & plan of care reviewed 
with GUANAKO ORTIZ.

## 2020-09-17 VITALS — SYSTOLIC BLOOD PRESSURE: 130 MMHG | DIASTOLIC BLOOD PRESSURE: 72 MMHG

## 2020-09-17 VITALS — DIASTOLIC BLOOD PRESSURE: 68 MMHG | SYSTOLIC BLOOD PRESSURE: 128 MMHG

## 2020-09-17 VITALS — SYSTOLIC BLOOD PRESSURE: 125 MMHG | DIASTOLIC BLOOD PRESSURE: 68 MMHG

## 2020-09-17 LAB
ALBUMIN SERPL BCP-MCNC: 1.8 G/DL (ref 3.4–5)
ALBUMIN/GLOB SERPL: 0.5 {RATIO} (ref 1.1–1.5)
ALP SERPL-CCNC: 158 IU/L (ref 46–116)
ALT SERPL W P-5'-P-CCNC: 41 U/L (ref 12–78)
ANION GAP SERPL CALCULATED.3IONS-SCNC: 17 MMOL/L (ref 8–16)
ANISOCYTOSIS BLD QL SMEAR: (no result)
APTT PPP: 40 SECONDS (ref 22–32)
AST SERPL W P-5'-P-CCNC: 33 U/L (ref 10–37)
BASOPHILS # BLD AUTO: 0.1 X10'3 (ref 0–0.2)
BASOPHILS NFR BLD AUTO: 0.8 % (ref 0–1)
BILIRUB SERPL-MCNC: 0.4 MG/DL (ref 0.1–1)
BUN SERPL-MCNC: 12 MG/DL (ref 7–18)
BUN/CREAT SERPL: 10 (ref 5.4–32)
BURR CELLS BLD QL SMEAR: (no result)
CALCIUM SERPL-MCNC: 7.7 MG/DL (ref 8.5–10.1)
CHLORIDE SERPL-SCNC: 106 MMOL/L (ref 99–107)
CO2 SERPL-SCNC: 16.7 MMOL/L (ref 24–32)
CREAT SERPL-MCNC: 1.2 MG/DL (ref 0.6–1.1)
EOSINOPHIL # BLD AUTO: 0.2 X10'3 (ref 0–0.9)
EOSINOPHIL NFR BLD AUTO: 1.7 % (ref 0–6)
ERYTHROCYTE [DISTWIDTH] IN BLOOD BY AUTOMATED COUNT: 16.3 % (ref 11.5–14.5)
GFR SERPL CREATININE-BSD FRML MDRD: 62 ML/MIN
GLUCOSE SERPL-MCNC: 66 MG/DL (ref 70–104)
HCT VFR BLD AUTO: 27.6 % (ref 42–52)
HGB BLD-MCNC: 8.9 G/DL (ref 14–17.9)
LG PLATELETS BLD QL SMEAR: (no result)
LYMPHOCYTES # BLD AUTO: 1.9 X10'3 (ref 1.1–4.8)
LYMPHOCYTES NFR BLD AUTO: 15.8 % (ref 21–51)
MAGNESIUM SERPL-MCNC: 1.2 MG/DL (ref 1.5–2.4)
MCH RBC QN AUTO: 29.5 PG (ref 27–31)
MCHC RBC AUTO-ENTMCNC: 32.2 G/DL (ref 33–36.5)
MCV RBC AUTO: 91.7 FL (ref 78–98)
MONOCYTES # BLD AUTO: 1.2 X10'3 (ref 0–0.9)
MONOCYTES NFR BLD AUTO: 10.3 % (ref 2–12)
NEUTROPHILS # BLD AUTO: 8.4 X10'3 (ref 1.8–7.7)
NEUTROPHILS NFR BLD AUTO: 71.4 % (ref 42–75)
OVALOCYTES BLD QL SMEAR: (no result)
PLATELET # BLD AUTO: 721 X10'3 (ref 140–440)
PLATELET BLD QL SMEAR: (no result)
PMV BLD AUTO: 8.3 FL (ref 7.4–10.4)
POIKILOCYTOSIS BLD QL SMEAR: (no result)
POTASSIUM SERPL-SCNC: 3.4 MMOL/L (ref 3.5–5.1)
PROT SERPL-MCNC: 5.2 G/DL (ref 6.4–8.2)
RBC # BLD AUTO: 3.01 X10'6 (ref 4.7–6.1)
RBC MORPH BLD: (no result)
SCHISTOCYTES BLD QL SMEAR: (no result)
SODIUM SERPL-SCNC: 140 MMOL/L (ref 135–145)
WBC # BLD AUTO: 11.7 X10'3 (ref 4.5–11)

## 2020-09-17 RX ADMIN — MAGNESIUM SULFATE IN WATER PRN MLS/HR: 40 INJECTION, SOLUTION INTRAVENOUS at 08:30

## 2020-09-17 RX ADMIN — POTASSIUM CHLORIDE PRN MLS/HR: 7.46 INJECTION, SOLUTION INTRAVENOUS at 23:35

## 2020-09-17 RX ADMIN — PANTOPRAZOLE SODIUM SCH MG: 40 TABLET, DELAYED RELEASE ORAL at 07:30

## 2020-09-17 RX ADMIN — POTASSIUM CHLORIDE PRN MLS/HR: 7.46 INJECTION, SOLUTION INTRAVENOUS at 20:34

## 2020-09-17 RX ADMIN — POTASSIUM CHLORIDE PRN MLS/HR: 7.46 INJECTION, SOLUTION INTRAVENOUS at 18:58

## 2020-09-17 RX ADMIN — MAGNESIUM SULFATE IN WATER PRN MLS/HR: 40 INJECTION, SOLUTION INTRAVENOUS at 16:43

## 2020-09-17 RX ADMIN — SODIUM CHLORIDE SCH MLS/HR: 9 INJECTION INTRAMUSCULAR; INTRAVENOUS; SUBCUTANEOUS at 02:42

## 2020-09-17 RX ADMIN — Medication SCH CAN: at 16:48

## 2020-09-17 RX ADMIN — Medication SCH MMU: at 20:34

## 2020-09-17 RX ADMIN — SODIUM CHLORIDE SCH MLS/HR: 9 INJECTION INTRAMUSCULAR; INTRAVENOUS; SUBCUTANEOUS at 14:41

## 2020-09-17 RX ADMIN — CARVEDILOL SCH MG: 6.25 TABLET, FILM COATED ORAL at 08:29

## 2020-09-17 RX ADMIN — Medication SCH CAN: at 08:00

## 2020-09-17 RX ADMIN — Medication SCH MMU: at 08:00

## 2020-09-17 RX ADMIN — IPRATROPIUM BROMIDE AND ALBUTEROL SULFATE SCH ML: .5; 3 SOLUTION RESPIRATORY (INHALATION) at 03:00

## 2020-09-17 RX ADMIN — AMOXICILLIN AND CLAVULANATE POTASSIUM SCH TAB: 875; 125 TABLET, FILM COATED ORAL at 16:42

## 2020-09-17 RX ADMIN — IPRATROPIUM BROMIDE AND ALBUTEROL SULFATE SCH ML: .5; 3 SOLUTION RESPIRATORY (INHALATION) at 21:30

## 2020-09-17 RX ADMIN — IPRATROPIUM BROMIDE AND ALBUTEROL SULFATE SCH ML: .5; 3 SOLUTION RESPIRATORY (INHALATION) at 08:55

## 2020-09-17 RX ADMIN — POTASSIUM CHLORIDE PRN MLS/HR: 7.46 INJECTION, SOLUTION INTRAVENOUS at 22:06

## 2020-09-17 RX ADMIN — Medication SCH TAB: at 08:00

## 2020-09-17 RX ADMIN — CARVEDILOL SCH MG: 6.25 TABLET, FILM COATED ORAL at 20:35

## 2020-09-17 RX ADMIN — Medication SCH CAN: at 12:07

## 2020-09-17 RX ADMIN — IPRATROPIUM BROMIDE AND ALBUTEROL SULFATE SCH ML: .5; 3 SOLUTION RESPIRATORY (INHALATION) at 15:20

## 2020-09-17 RX ADMIN — AMOXICILLIN AND CLAVULANATE POTASSIUM SCH TAB: 875; 125 TABLET, FILM COATED ORAL at 07:31

## 2020-09-17 NOTE — NUR
PAGER ID:  [phone] 

MESSAGE:  ZELALEM SHORT 360B : PT REQUESTING THAT HIS RESTORIL AT NIGHT HAVE A MAY REPEAT X1 
ORDER.. ARE YOU OK WITH THAT? THANKS, MARGARITO [phone]

## 2020-09-17 NOTE — NUR
PAGER ID:  [phone] 

MESSAGE:  ZELALEM SelbyB: pt's AM glucose 59, non diabetic. he is NPO. would you like me to 
give IV d50? or switch to dextrose in fluids? alfred [phone]

## 2020-09-17 NOTE — NUR
Patient in room HOOD 360. I have received report from  Kyra MUJICA  and had the opportunity to 
ask questions and assume patient care.

## 2020-09-18 VITALS — DIASTOLIC BLOOD PRESSURE: 58 MMHG | SYSTOLIC BLOOD PRESSURE: 103 MMHG

## 2020-09-18 VITALS — DIASTOLIC BLOOD PRESSURE: 70 MMHG | SYSTOLIC BLOOD PRESSURE: 133 MMHG

## 2020-09-18 VITALS — DIASTOLIC BLOOD PRESSURE: 70 MMHG | SYSTOLIC BLOOD PRESSURE: 120 MMHG

## 2020-09-18 VITALS — SYSTOLIC BLOOD PRESSURE: 140 MMHG | DIASTOLIC BLOOD PRESSURE: 85 MMHG

## 2020-09-18 VITALS — SYSTOLIC BLOOD PRESSURE: 120 MMHG | DIASTOLIC BLOOD PRESSURE: 70 MMHG

## 2020-09-18 LAB
ALBUMIN SERPL BCP-MCNC: 1.7 G/DL (ref 3.4–5)
ALBUMIN/GLOB SERPL: 0.5 {RATIO} (ref 1.1–1.5)
ALP SERPL-CCNC: 135 IU/L (ref 46–116)
ALT SERPL W P-5'-P-CCNC: 33 U/L (ref 12–78)
ANION GAP SERPL CALCULATED.3IONS-SCNC: 8 MMOL/L (ref 8–16)
APTT PPP: 39 SECONDS (ref 22–32)
AST SERPL W P-5'-P-CCNC: 25 U/L (ref 10–37)
BASOPHILS # BLD AUTO: 0.1 X10'3 (ref 0–0.2)
BASOPHILS NFR BLD AUTO: 0.7 % (ref 0–1)
BILIRUB SERPL-MCNC: 0.3 MG/DL (ref 0.1–1)
BUN SERPL-MCNC: 5 MG/DL (ref 7–18)
BUN/CREAT SERPL: 4.9 (ref 5.4–32)
CALCIUM SERPL-MCNC: 7.3 MG/DL (ref 8.5–10.1)
CHLORIDE SERPL-SCNC: 106 MMOL/L (ref 99–107)
CO2 SERPL-SCNC: 20.7 MMOL/L (ref 24–32)
CREAT SERPL-MCNC: 1.02 MG/DL (ref 0.6–1.1)
EOSINOPHIL # BLD AUTO: 0.2 X10'3 (ref 0–0.9)
EOSINOPHIL NFR BLD AUTO: 2.2 % (ref 0–6)
ERYTHROCYTE [DISTWIDTH] IN BLOOD BY AUTOMATED COUNT: 16.4 % (ref 11.5–14.5)
GFR SERPL CREATININE-BSD FRML MDRD: 75 ML/MIN
GLUCOSE SERPL-MCNC: 110 MG/DL (ref 70–104)
HCT VFR BLD AUTO: 25.5 % (ref 42–52)
HGB BLD-MCNC: 8.8 G/DL (ref 14–17.9)
LYMPHOCYTES # BLD AUTO: 1.7 X10'3 (ref 1.1–4.8)
LYMPHOCYTES NFR BLD AUTO: 16.7 % (ref 21–51)
MAGNESIUM SERPL-MCNC: 1.9 MG/DL (ref 1.5–2.4)
MCH RBC QN AUTO: 31.2 PG (ref 27–31)
MCHC RBC AUTO-ENTMCNC: 34.4 G/DL (ref 33–36.5)
MCV RBC AUTO: 90.7 FL (ref 78–98)
MONOCYTES # BLD AUTO: 1.1 X10'3 (ref 0–0.9)
MONOCYTES NFR BLD AUTO: 11 % (ref 2–12)
NEUTROPHILS # BLD AUTO: 7.1 X10'3 (ref 1.8–7.7)
NEUTROPHILS NFR BLD AUTO: 69.4 % (ref 42–75)
PLATELET # BLD AUTO: 764 X10'3 (ref 140–440)
PMV BLD AUTO: 7.9 FL (ref 7.4–10.4)
POTASSIUM SERPL-SCNC: 3.2 MMOL/L (ref 3.5–5.1)
PROT SERPL-MCNC: 5 G/DL (ref 6.4–8.2)
RBC # BLD AUTO: 2.82 X10'6 (ref 4.7–6.1)
SODIUM SERPL-SCNC: 135 MMOL/L (ref 135–145)
WBC # BLD AUTO: 10.2 X10'3 (ref 4.5–11)

## 2020-09-18 RX ADMIN — POTASSIUM CHLORIDE PRN MLS/HR: 7.46 INJECTION, SOLUTION INTRAVENOUS at 16:16

## 2020-09-18 RX ADMIN — Medication SCH CAN: at 13:00

## 2020-09-18 RX ADMIN — AMOXICILLIN AND CLAVULANATE POTASSIUM SCH TAB: 875; 125 TABLET, FILM COATED ORAL at 16:23

## 2020-09-18 RX ADMIN — Medication SCH MMU: at 08:02

## 2020-09-18 RX ADMIN — IPRATROPIUM BROMIDE AND ALBUTEROL SULFATE SCH ML: .5; 3 SOLUTION RESPIRATORY (INHALATION) at 14:10

## 2020-09-18 RX ADMIN — PANTOPRAZOLE SODIUM SCH MG: 40 TABLET, DELAYED RELEASE ORAL at 08:02

## 2020-09-18 RX ADMIN — IPRATROPIUM BROMIDE AND ALBUTEROL SULFATE SCH ML: .5; 3 SOLUTION RESPIRATORY (INHALATION) at 03:00

## 2020-09-18 RX ADMIN — SODIUM CHLORIDE SCH MLS/HR: 9 INJECTION INTRAMUSCULAR; INTRAVENOUS; SUBCUTANEOUS at 20:41

## 2020-09-18 RX ADMIN — SODIUM CHLORIDE SCH MLS/HR: 9 INJECTION INTRAMUSCULAR; INTRAVENOUS; SUBCUTANEOUS at 00:41

## 2020-09-18 RX ADMIN — AMOXICILLIN AND CLAVULANATE POTASSIUM SCH TAB: 875; 125 TABLET, FILM COATED ORAL at 08:02

## 2020-09-18 RX ADMIN — POTASSIUM CHLORIDE PRN MLS/HR: 7.46 INJECTION, SOLUTION INTRAVENOUS at 10:32

## 2020-09-18 RX ADMIN — IPRATROPIUM BROMIDE AND ALBUTEROL SULFATE SCH ML: .5; 3 SOLUTION RESPIRATORY (INHALATION) at 20:39

## 2020-09-18 RX ADMIN — Medication SCH CAN: at 08:00

## 2020-09-18 RX ADMIN — SODIUM CHLORIDE SCH MLS/HR: 9 INJECTION INTRAMUSCULAR; INTRAVENOUS; SUBCUTANEOUS at 10:41

## 2020-09-18 RX ADMIN — Medication SCH MMU: at 21:04

## 2020-09-18 RX ADMIN — POTASSIUM CHLORIDE PRN MLS/HR: 7.46 INJECTION, SOLUTION INTRAVENOUS at 13:03

## 2020-09-18 RX ADMIN — CARVEDILOL SCH MG: 6.25 TABLET, FILM COATED ORAL at 08:03

## 2020-09-18 RX ADMIN — IPRATROPIUM BROMIDE AND ALBUTEROL SULFATE SCH ML: .5; 3 SOLUTION RESPIRATORY (INHALATION) at 08:07

## 2020-09-18 RX ADMIN — POTASSIUM CHLORIDE PRN MLS/HR: 7.46 INJECTION, SOLUTION INTRAVENOUS at 08:01

## 2020-09-18 RX ADMIN — Medication SCH CAN: at 18:00

## 2020-09-18 RX ADMIN — Medication SCH TAB: at 08:03

## 2020-09-18 RX ADMIN — CARVEDILOL SCH MG: 6.25 TABLET, FILM COATED ORAL at 21:04

## 2020-09-18 NOTE — NUR
Problems reprioritized. Patient report given, questions answered & plan of care reviewed 
with Chiqui MUJICA.

## 2020-09-18 NOTE — NUR
I have received report from GUANAKO Florian  and had the opportunity to ask questions and 
assume patient care.

## 2020-09-18 NOTE — NUR
Patient in room HOOD 360. I have received report from Chiqui MUJICA   and had the opportunity 
to ask questions and assume patient care.

## 2020-09-18 NOTE — NUR
PAGER ID:  [phone] 

MESSAGE:  Emmanuel Selby 360P Having many liquid stools for at least a week. Requiring 
almost daily potassium replacement. Since pt. is eating and not NPO anymore can we have 
order for continues fluids with K in it instead of dextrose? Chiqui [phone]

## 2020-09-19 VITALS — DIASTOLIC BLOOD PRESSURE: 66 MMHG | SYSTOLIC BLOOD PRESSURE: 111 MMHG

## 2020-09-19 VITALS — SYSTOLIC BLOOD PRESSURE: 103 MMHG | DIASTOLIC BLOOD PRESSURE: 62 MMHG

## 2020-09-19 VITALS — DIASTOLIC BLOOD PRESSURE: 62 MMHG | SYSTOLIC BLOOD PRESSURE: 103 MMHG

## 2020-09-19 VITALS — SYSTOLIC BLOOD PRESSURE: 99 MMHG | DIASTOLIC BLOOD PRESSURE: 61 MMHG

## 2020-09-19 LAB
MAGNESIUM SERPL-MCNC: 1.3 MG/DL (ref 1.5–2.4)
POTASSIUM SERPL-SCNC: 3.2 MMOL/L (ref 3.5–5.1)

## 2020-09-19 RX ADMIN — PANTOPRAZOLE SODIUM SCH MG: 40 TABLET, DELAYED RELEASE ORAL at 08:36

## 2020-09-19 RX ADMIN — IPRATROPIUM BROMIDE AND ALBUTEROL SULFATE SCH ML: .5; 3 SOLUTION RESPIRATORY (INHALATION) at 20:27

## 2020-09-19 RX ADMIN — Medication SCH MMU: at 08:33

## 2020-09-19 RX ADMIN — POTASSIUM BICARBONATE PRN MEQ: 782 TABLET, EFFERVESCENT ORAL at 17:05

## 2020-09-19 RX ADMIN — Medication PRN MG: at 17:05

## 2020-09-19 RX ADMIN — CARVEDILOL SCH MG: 6.25 TABLET, FILM COATED ORAL at 08:38

## 2020-09-19 RX ADMIN — IPRATROPIUM BROMIDE AND ALBUTEROL SULFATE SCH ML: .5; 3 SOLUTION RESPIRATORY (INHALATION) at 03:26

## 2020-09-19 RX ADMIN — AMOXICILLIN AND CLAVULANATE POTASSIUM SCH TAB: 875; 125 TABLET, FILM COATED ORAL at 08:35

## 2020-09-19 RX ADMIN — CARVEDILOL SCH MG: 6.25 TABLET, FILM COATED ORAL at 20:52

## 2020-09-19 RX ADMIN — Medication SCH CAN: at 18:55

## 2020-09-19 RX ADMIN — POTASSIUM BICARBONATE PRN MEQ: 782 TABLET, EFFERVESCENT ORAL at 12:50

## 2020-09-19 RX ADMIN — IPRATROPIUM BROMIDE AND ALBUTEROL SULFATE SCH ML: .5; 3 SOLUTION RESPIRATORY (INHALATION) at 08:51

## 2020-09-19 RX ADMIN — Medication SCH MMU: at 20:51

## 2020-09-19 RX ADMIN — SODIUM CHLORIDE SCH MLS/HR: 9 INJECTION INTRAMUSCULAR; INTRAVENOUS; SUBCUTANEOUS at 06:41

## 2020-09-19 RX ADMIN — Medication SCH CAN: at 13:55

## 2020-09-19 RX ADMIN — Medication PRN MG: at 08:34

## 2020-09-19 RX ADMIN — IPRATROPIUM BROMIDE AND ALBUTEROL SULFATE SCH ML: .5; 3 SOLUTION RESPIRATORY (INHALATION) at 13:56

## 2020-09-19 RX ADMIN — POTASSIUM BICARBONATE PRN MEQ: 782 TABLET, EFFERVESCENT ORAL at 08:33

## 2020-09-19 RX ADMIN — Medication SCH CAN: at 08:40

## 2020-09-19 RX ADMIN — Medication SCH TAB: at 08:36

## 2020-09-19 NOTE — NUR
Reassessment: Pt declining to have procedure done at this time per physician notes. 
Patient's diet was advanced to clear liquids and pt documented with 0% PO intake first meal, 
up to 100% at following meal. Diet was further advanced to regular and pt documented with 
25% PO intake at dinner last night, pending documentation of PO intake for today. Ensure 
Enlive TID has been resumed with diet advancement, pt documented with 100% PO intake. LBM 
9/19, documented with diarrhea. Per physician notes pt reports having a large BM which has 
been verified by nursing staff. Pt would benefit from diet change to low fiber given recent 
GI surgery and liquid stools. Pt receiving PRN electrolyte replacement. Will continue to 
follow closely and monitor need for further nutrition intervention.

Recommendations:

1) Diet change to low-residue given recent GI surgery and liquid stools

2) Strawberry Ensure Enlive TID, dislikes chocolate

3) Honor food preferences: dislikes cream of wheat

4) Bowel care per rx; if copious diarrhea consider anti-diarrheal post-op

5) weekly scaled weights

-------------------------------------------------------------------------------

Addendum: 09/19/20 at 1114 by Mecca Pavon RD

-------------------------------------------------------------------------------

Amended: Links added.

## 2020-09-19 NOTE — NUR
Received report from primary care nurse Chiqui MUJICA. Patient is awake and alert on room air. 
Call light and items of frequent use within reach.

## 2020-09-19 NOTE — NUR
Patient declined second dressing change at this time, "she just did it." Will offer again in 
am during lab rounds.

-------------------------------------------------------------------------------

Addendum: 09/20/20 at 0012 by Lindsey Robledo RN

-------------------------------------------------------------------------------

Amended: Links added.

## 2020-09-20 VITALS — SYSTOLIC BLOOD PRESSURE: 95 MMHG | DIASTOLIC BLOOD PRESSURE: 53 MMHG

## 2020-09-20 VITALS — SYSTOLIC BLOOD PRESSURE: 112 MMHG | DIASTOLIC BLOOD PRESSURE: 68 MMHG

## 2020-09-20 VITALS — DIASTOLIC BLOOD PRESSURE: 62 MMHG | SYSTOLIC BLOOD PRESSURE: 104 MMHG

## 2020-09-20 VITALS — SYSTOLIC BLOOD PRESSURE: 116 MMHG

## 2020-09-20 VITALS — DIASTOLIC BLOOD PRESSURE: 68 MMHG | SYSTOLIC BLOOD PRESSURE: 104 MMHG

## 2020-09-20 LAB
ALBUMIN SERPL BCP-MCNC: 1.8 G/DL (ref 3.4–5)
ALBUMIN/GLOB SERPL: 0.5 {RATIO} (ref 1.1–1.5)
ALP SERPL-CCNC: 134 IU/L (ref 46–116)
ALT SERPL W P-5'-P-CCNC: 25 U/L (ref 12–78)
ANION GAP SERPL CALCULATED.3IONS-SCNC: 6 MMOL/L (ref 8–16)
AST SERPL W P-5'-P-CCNC: 23 U/L (ref 10–37)
BASOPHILS # BLD AUTO: 0.1 X10'3 (ref 0–0.2)
BASOPHILS NFR BLD AUTO: 0.7 % (ref 0–1)
BILIRUB SERPL-MCNC: 0.2 MG/DL (ref 0.1–1)
BUN SERPL-MCNC: 2 MG/DL (ref 7–18)
BUN/CREAT SERPL: 1.9 (ref 5.4–32)
CALCIUM SERPL-MCNC: 7.8 MG/DL (ref 8.5–10.1)
CHLORIDE SERPL-SCNC: 107 MMOL/L (ref 99–107)
CO2 SERPL-SCNC: 24.1 MMOL/L (ref 24–32)
CREAT SERPL-MCNC: 1.07 MG/DL (ref 0.6–1.1)
EOSINOPHIL # BLD AUTO: 0.2 X10'3 (ref 0–0.9)
EOSINOPHIL NFR BLD AUTO: 2.6 % (ref 0–6)
ERYTHROCYTE [DISTWIDTH] IN BLOOD BY AUTOMATED COUNT: 16.2 % (ref 11.5–14.5)
GFR SERPL CREATININE-BSD FRML MDRD: 71 ML/MIN
GLUCOSE SERPL-MCNC: 104 MG/DL (ref 70–104)
HCT VFR BLD AUTO: 25 % (ref 42–52)
HGB BLD-MCNC: 8.5 G/DL (ref 14–17.9)
LYMPHOCYTES # BLD AUTO: 1.9 X10'3 (ref 1.1–4.8)
LYMPHOCYTES NFR BLD AUTO: 22.9 % (ref 21–51)
MAGNESIUM SERPL-MCNC: 1.3 MG/DL (ref 1.5–2.4)
MCH RBC QN AUTO: 30.5 PG (ref 27–31)
MCHC RBC AUTO-ENTMCNC: 34.2 G/DL (ref 33–36.5)
MCV RBC AUTO: 89.3 FL (ref 78–98)
MONOCYTES # BLD AUTO: 0.8 X10'3 (ref 0–0.9)
MONOCYTES NFR BLD AUTO: 9.7 % (ref 2–12)
NEUTROPHILS # BLD AUTO: 5.3 X10'3 (ref 1.8–7.7)
NEUTROPHILS NFR BLD AUTO: 64.1 % (ref 42–75)
PLATELET # BLD AUTO: 787 X10'3 (ref 140–440)
PMV BLD AUTO: 7.8 FL (ref 7.4–10.4)
POTASSIUM SERPL-SCNC: 3.6 MMOL/L (ref 3.5–5.1)
PROT SERPL-MCNC: 5.2 G/DL (ref 6.4–8.2)
RBC # BLD AUTO: 2.8 X10'6 (ref 4.7–6.1)
SODIUM SERPL-SCNC: 137 MMOL/L (ref 135–145)
WBC # BLD AUTO: 8.3 X10'3 (ref 4.5–11)

## 2020-09-20 RX ADMIN — Medication PRN MG: at 09:20

## 2020-09-20 RX ADMIN — Medication SCH TAB: at 09:17

## 2020-09-20 RX ADMIN — IPRATROPIUM BROMIDE AND ALBUTEROL SULFATE SCH ML: .5; 3 SOLUTION RESPIRATORY (INHALATION) at 08:17

## 2020-09-20 RX ADMIN — IPRATROPIUM BROMIDE AND ALBUTEROL SULFATE SCH ML: .5; 3 SOLUTION RESPIRATORY (INHALATION) at 20:52

## 2020-09-20 RX ADMIN — PANTOPRAZOLE SODIUM SCH MG: 40 TABLET, DELAYED RELEASE ORAL at 09:13

## 2020-09-20 RX ADMIN — Medication SCH MMU: at 09:15

## 2020-09-20 RX ADMIN — CARVEDILOL SCH MG: 6.25 TABLET, FILM COATED ORAL at 21:48

## 2020-09-20 RX ADMIN — CARVEDILOL SCH MG: 6.25 TABLET, FILM COATED ORAL at 09:14

## 2020-09-20 RX ADMIN — Medication SCH CAN: at 13:19

## 2020-09-20 RX ADMIN — Medication SCH CAN: at 08:00

## 2020-09-20 RX ADMIN — Medication PRN MG: at 21:48

## 2020-09-20 RX ADMIN — Medication SCH CAN: at 18:36

## 2020-09-20 RX ADMIN — IPRATROPIUM BROMIDE AND ALBUTEROL SULFATE SCH ML: .5; 3 SOLUTION RESPIRATORY (INHALATION) at 03:51

## 2020-09-20 RX ADMIN — IPRATROPIUM BROMIDE AND ALBUTEROL SULFATE SCH ML: .5; 3 SOLUTION RESPIRATORY (INHALATION) at 13:57

## 2020-09-20 RX ADMIN — Medication SCH MMU: at 21:48

## 2020-09-20 NOTE — NUR
Patient in room HOOD 360. I have received report from GUANAKO Ellis   and had the opportunity to 
ask questions and assume patient care.

## 2020-09-20 NOTE — NUR
Reported off to Rhonda MUJICA. Patient is resting with relaxed and unlabored respirations on room 
air. Call light and items of frequent use within reach.

## 2020-09-20 NOTE — NUR
Patient in room HOOD 360. I have received report from Lindsey MUJICA   and had the opportunity to 
ask questions and assume patient care.

## 2020-09-20 NOTE — NUR
patient appears stable this shift. percocet for pain x2 with relief. patient stated he has 
been up to BR x6 times for BM and Voiding. No evidence observed of this. patient counselled 
to leave BM and void in urinal so staff can observe. dressing changed to midline, clean, no 
sign of infection observed. Redressed. patient seen by Dr swan. will continue to 
monitor.

## 2020-09-20 NOTE — NUR
patient stated he had six bm and six voids no evidence of this seen. patient counselled to 
leave urine and stool in bathroom so staff can observe

-------------------------------------------------------------------------------

Addendum: 09/20/20 at 1704 by Rhonda White RN

-------------------------------------------------------------------------------

Amended: Links added.

## 2020-09-21 VITALS — SYSTOLIC BLOOD PRESSURE: 110 MMHG | DIASTOLIC BLOOD PRESSURE: 60 MMHG

## 2020-09-21 VITALS — SYSTOLIC BLOOD PRESSURE: 110 MMHG | DIASTOLIC BLOOD PRESSURE: 64 MMHG

## 2020-09-21 VITALS — SYSTOLIC BLOOD PRESSURE: 162 MMHG | DIASTOLIC BLOOD PRESSURE: 67 MMHG

## 2020-09-21 VITALS — SYSTOLIC BLOOD PRESSURE: 124 MMHG | DIASTOLIC BLOOD PRESSURE: 74 MMHG

## 2020-09-21 LAB
ALBUMIN SERPL BCP-MCNC: 1.9 G/DL (ref 3.4–5)
ALBUMIN/GLOB SERPL: 0.5 {RATIO} (ref 1.1–1.5)
ALP SERPL-CCNC: 146 IU/L (ref 46–116)
ALT SERPL W P-5'-P-CCNC: 29 U/L (ref 12–78)
ANION GAP SERPL CALCULATED.3IONS-SCNC: 10 MMOL/L (ref 8–16)
AST SERPL W P-5'-P-CCNC: 23 U/L (ref 10–37)
BASOPHILS # BLD AUTO: 0.1 X10'3 (ref 0–0.2)
BASOPHILS NFR BLD AUTO: 0.9 % (ref 0–1)
BILIRUB SERPL-MCNC: 0.3 MG/DL (ref 0.1–1)
BUN SERPL-MCNC: 5 MG/DL (ref 7–18)
BUN/CREAT SERPL: 5.1 (ref 5.4–32)
CALCIUM SERPL-MCNC: 7.3 MG/DL (ref 8.5–10.1)
CHLORIDE SERPL-SCNC: 105 MMOL/L (ref 99–107)
CO2 SERPL-SCNC: 24.5 MMOL/L (ref 24–32)
CREAT SERPL-MCNC: 0.98 MG/DL (ref 0.6–1.1)
EOSINOPHIL # BLD AUTO: 0.3 X10'3 (ref 0–0.9)
EOSINOPHIL NFR BLD AUTO: 4 % (ref 0–6)
ERYTHROCYTE [DISTWIDTH] IN BLOOD BY AUTOMATED COUNT: 16 % (ref 11.5–14.5)
GFR SERPL CREATININE-BSD FRML MDRD: 79 ML/MIN
GLUCOSE SERPL-MCNC: 99 MG/DL (ref 70–104)
HCT VFR BLD AUTO: 24.7 % (ref 42–52)
HGB BLD-MCNC: 8.3 G/DL (ref 14–17.9)
LYMPHOCYTES # BLD AUTO: 2.3 X10'3 (ref 1.1–4.8)
LYMPHOCYTES NFR BLD AUTO: 34.9 % (ref 21–51)
MAGNESIUM SERPL-MCNC: 1 MG/DL (ref 1.5–2.4)
MCH RBC QN AUTO: 30.4 PG (ref 27–31)
MCHC RBC AUTO-ENTMCNC: 33.7 G/DL (ref 33–36.5)
MCV RBC AUTO: 90.3 FL (ref 78–98)
MONOCYTES # BLD AUTO: 0.6 X10'3 (ref 0–0.9)
MONOCYTES NFR BLD AUTO: 9.5 % (ref 2–12)
NEUTROPHILS # BLD AUTO: 3.4 X10'3 (ref 1.8–7.7)
NEUTROPHILS NFR BLD AUTO: 50.7 % (ref 42–75)
PLATELET # BLD AUTO: 803 X10'3 (ref 140–440)
PMV BLD AUTO: 8 FL (ref 7.4–10.4)
POTASSIUM SERPL-SCNC: 3.4 MMOL/L (ref 3.5–5.1)
PROT SERPL-MCNC: 5.4 G/DL (ref 6.4–8.2)
RBC # BLD AUTO: 2.74 X10'6 (ref 4.7–6.1)
SODIUM SERPL-SCNC: 139 MMOL/L (ref 135–145)
WBC # BLD AUTO: 6.7 X10'3 (ref 4.5–11)

## 2020-09-21 RX ADMIN — Medication SCH CAN: at 13:25

## 2020-09-21 RX ADMIN — IPRATROPIUM BROMIDE AND ALBUTEROL SULFATE SCH ML: .5; 3 SOLUTION RESPIRATORY (INHALATION) at 02:54

## 2020-09-21 RX ADMIN — Medication SCH MMU: at 07:55

## 2020-09-21 RX ADMIN — POTASSIUM BICARBONATE PRN MEQ: 782 TABLET, EFFERVESCENT ORAL at 13:29

## 2020-09-21 RX ADMIN — IPRATROPIUM BROMIDE AND ALBUTEROL SULFATE SCH ML: .5; 3 SOLUTION RESPIRATORY (INHALATION) at 09:45

## 2020-09-21 RX ADMIN — Medication PRN MG: at 07:57

## 2020-09-21 RX ADMIN — PANTOPRAZOLE SODIUM SCH MG: 40 TABLET, DELAYED RELEASE ORAL at 07:51

## 2020-09-21 RX ADMIN — POTASSIUM BICARBONATE PRN MEQ: 782 TABLET, EFFERVESCENT ORAL at 07:56

## 2020-09-21 RX ADMIN — Medication SCH CAN: at 07:52

## 2020-09-21 RX ADMIN — CARVEDILOL SCH MG: 6.25 TABLET, FILM COATED ORAL at 07:56

## 2020-09-21 RX ADMIN — Medication SCH TAB: at 07:56

## 2020-09-21 NOTE — NUR
Patient in room HOOD 360. I have received report from Carisa PHELPS RN   and had the opportunity to 
ask questions and assume patient care.

## 2020-09-21 NOTE — NUR
Problems reprioritized. Patient report given, questions answered & plan of care reviewed 
with GUANAKO Silvestre.

## 2020-09-21 NOTE — NUR
Pt states he has had 3 voids and 3 BMs but did not save. Pt re-educated to call staff to 
visualize voids/BMs. Pt verbalized understanding.

## 2020-09-21 NOTE — NUR
Pt stable and appropriate for d/c. Extended PIV dc'd, cannula intact. Reviewed with Pt all 
d/c instructions and meds, f/u appts, wound care, additional supplies given. Pt has f/u appt 
with [facility], 9/22, Tues at 1230. Pt verbalized understanding. pt given 
opportunity to ask questions, answers provided. Prescriptions escripted to [facility] [place] 
[place]. Pt's home meds returned to pt. Pt to call and make f/u appts with PCP and Dr Abrams 
for 1 week. Pt to call PCP with any questions/concerns or signs/symptoms of complications or 
infection or return to nearest ED. Pt escorted to front lobby by staff member via w/c with 
all personal belongings. Pt d/c'd home in private vehicle driven by friend/transport that pt 
phoned.

## 2020-09-21 NOTE — NUR
PAGER ID:  [phone] 

MESSAGE:  RE: [id number], Emmanuel Selby. Mg+ is 1.0. Will replace per protocol. thank you, Kary 
[phone]

## 2021-09-10 ENCOUNTER — HOSPITAL ENCOUNTER (INPATIENT)
Dept: HOSPITAL 94 - ER | Age: 61
LOS: 7 days | Discharge: NURSING, REHAB OR LONG-TERM CARE | DRG: 247 | End: 2021-09-17
Attending: INTERNAL MEDICINE | Admitting: INTERNAL MEDICINE
Payer: MEDICAID

## 2021-09-10 VITALS — SYSTOLIC BLOOD PRESSURE: 99 MMHG | DIASTOLIC BLOOD PRESSURE: 56 MMHG

## 2021-09-10 VITALS — SYSTOLIC BLOOD PRESSURE: 98 MMHG | DIASTOLIC BLOOD PRESSURE: 56 MMHG

## 2021-09-10 VITALS — DIASTOLIC BLOOD PRESSURE: 55 MMHG | SYSTOLIC BLOOD PRESSURE: 95 MMHG

## 2021-09-10 VITALS — DIASTOLIC BLOOD PRESSURE: 62 MMHG | SYSTOLIC BLOOD PRESSURE: 107 MMHG

## 2021-09-10 VITALS — SYSTOLIC BLOOD PRESSURE: 106 MMHG | DIASTOLIC BLOOD PRESSURE: 64 MMHG

## 2021-09-10 VITALS — SYSTOLIC BLOOD PRESSURE: 95 MMHG | DIASTOLIC BLOOD PRESSURE: 62 MMHG

## 2021-09-10 VITALS — SYSTOLIC BLOOD PRESSURE: 102 MMHG | DIASTOLIC BLOOD PRESSURE: 61 MMHG

## 2021-09-10 VITALS — SYSTOLIC BLOOD PRESSURE: 116 MMHG | DIASTOLIC BLOOD PRESSURE: 65 MMHG

## 2021-09-10 VITALS — BODY MASS INDEX: 24.29 KG/M2 | HEIGHT: 67 IN | WEIGHT: 154.76 LBS

## 2021-09-10 DIAGNOSIS — I82.502: ICD-10-CM

## 2021-09-10 DIAGNOSIS — K56.600: Primary | ICD-10-CM

## 2021-09-10 DIAGNOSIS — I25.10: ICD-10-CM

## 2021-09-10 DIAGNOSIS — Z79.899: ICD-10-CM

## 2021-09-10 DIAGNOSIS — E87.6: ICD-10-CM

## 2021-09-10 DIAGNOSIS — Z93.3: ICD-10-CM

## 2021-09-10 DIAGNOSIS — D63.8: ICD-10-CM

## 2021-09-10 DIAGNOSIS — I10: ICD-10-CM

## 2021-09-10 DIAGNOSIS — I73.9: ICD-10-CM

## 2021-09-10 DIAGNOSIS — E43: ICD-10-CM

## 2021-09-10 DIAGNOSIS — Z87.891: ICD-10-CM

## 2021-09-10 DIAGNOSIS — Z79.01: ICD-10-CM

## 2021-09-10 DIAGNOSIS — Z90.49: ICD-10-CM

## 2021-09-10 DIAGNOSIS — N13.30: ICD-10-CM

## 2021-09-10 LAB
ALBUMIN SERPL BCP-MCNC: 1.1 G/DL (ref 3.4–5)
ALBUMIN/GLOB SERPL: 0.3 {RATIO} (ref 1.1–1.5)
ALP SERPL-CCNC: 207 IU/L (ref 46–116)
ALT SERPL W P-5'-P-CCNC: 8 U/L (ref 12–78)
ANION GAP SERPL CALCULATED.3IONS-SCNC: 6 MMOL/L (ref 8–16)
ANISOCYTOSIS BLD QL SMEAR: (no result)
AST SERPL W P-5'-P-CCNC: 18 U/L (ref 10–37)
BASOPHILS # BLD AUTO: (no result) X10'3 (ref 0–0.2)
BASOPHILS NFR BLD AUTO: (no result) % (ref 0–1)
BILIRUB SERPL-MCNC: 0.3 MG/DL (ref 0.1–1)
BUN SERPL-MCNC: 5 MG/DL (ref 7–18)
BUN/CREAT SERPL: 8.8 (ref 5.4–32)
BURR CELLS BLD QL SMEAR: (no result)
CALCIUM SERPL-MCNC: 7.1 MG/DL (ref 8.5–10.1)
CHLORIDE SERPL-SCNC: 101 MMOL/L (ref 99–107)
CO2 SERPL-SCNC: 25 MMOL/L (ref 24–32)
CREAT SERPL-MCNC: 0.57 MG/DL (ref 0.6–1.1)
EOSINOPHIL # BLD AUTO: (no result) X10'3 (ref 0–0.9)
EOSINOPHIL NFR BLD AUTO: (no result) % (ref 0–6)
EOSINOPHIL NFR BLD MANUAL: 4 % (ref 0–6)
ERYTHROCYTE [DISTWIDTH] IN BLOOD BY AUTOMATED COUNT: 17.7 % (ref 11.5–14.5)
GFR SERPL CREATININE-BSD FRML MDRD: > 90 ML/MIN
GLUCOSE SERPL-MCNC: 107 MG/DL (ref 70–104)
HCT VFR BLD AUTO: 19.7 % (ref 42–52)
HGB BLD-MCNC: 6.3 G/DL (ref 14–17.9)
LYMPHOCYTES # BLD AUTO: (no result) X10'3 (ref 1.1–4.8)
LYMPHOCYTES NFR BLD AUTO: (no result) % (ref 21–51)
LYMPHOCYTES NFR BLD MANUAL: 12 % (ref 21–51)
MCH RBC QN AUTO: 30.5 PG (ref 27–31)
MCHC RBC AUTO-ENTMCNC: 32 G/DL (ref 33–36.5)
MCV RBC AUTO: 95.4 FL (ref 78–98)
MONOCYTES # BLD AUTO: (no result) X10'3 (ref 0–0.9)
MONOCYTES NFR BLD AUTO: (no result) % (ref 2–12)
MONOCYTES NFR BLD MANUAL: 12 % (ref 2–12)
NEUTROPHILS # BLD AUTO: (no result) X10'3 (ref 1.8–7.7)
NEUTROPHILS NFR BLD AUTO: (no result) % (ref 42–75)
NEUTS SEG NFR BLD MANUAL: 72 % (ref 42–75)
PLATELET # BLD AUTO: 183 X10'3 (ref 140–440)
PLATELET BLD QL SMEAR: NORMAL
PMV BLD AUTO: 7.3 FL (ref 7.4–10.4)
POLYCHROMASIA BLD QL SMEAR: (no result)
POTASSIUM SERPL-SCNC: 3.4 MMOL/L (ref 3.5–5.1)
PROT SERPL-MCNC: 4.6 G/DL (ref 6.4–8.2)
RBC # BLD AUTO: 2.07 X10'6 (ref 4.7–6.1)
RBC MORPH BLD: (no result)
SODIUM SERPL-SCNC: 132 MMOL/L (ref 135–145)
TOTAL CELLS COUNTED FLD: 100
WBC # BLD AUTO: 11.1 X10'3 (ref 4.5–11)

## 2021-09-10 PROCEDURE — 71045 X-RAY EXAM CHEST 1 VIEW: CPT

## 2021-09-10 PROCEDURE — 36430 TRANSFUSION BLD/BLD COMPNT: CPT

## 2021-09-10 PROCEDURE — 96360 HYDRATION IV INFUSION INIT: CPT

## 2021-09-10 PROCEDURE — 80053 COMPREHEN METABOLIC PANEL: CPT

## 2021-09-10 PROCEDURE — 86900 BLOOD TYPING SEROLOGIC ABO: CPT

## 2021-09-10 PROCEDURE — 85025 COMPLETE CBC W/AUTO DIFF WBC: CPT

## 2021-09-10 PROCEDURE — 87040 BLOOD CULTURE FOR BACTERIA: CPT

## 2021-09-10 PROCEDURE — 84145 PROCALCITONIN (PCT): CPT

## 2021-09-10 PROCEDURE — 82607 VITAMIN B-12: CPT

## 2021-09-10 PROCEDURE — 87088 URINE BACTERIA CULTURE: CPT

## 2021-09-10 PROCEDURE — 84100 ASSAY OF PHOSPHORUS: CPT

## 2021-09-10 PROCEDURE — 83540 ASSAY OF IRON: CPT

## 2021-09-10 PROCEDURE — 74176 CT ABD & PELVIS W/O CONTRAST: CPT

## 2021-09-10 PROCEDURE — 85008 BL SMEAR W/O DIFF WBC COUNT: CPT

## 2021-09-10 PROCEDURE — 86885 COOMBS TEST INDIRECT QUAL: CPT

## 2021-09-10 PROCEDURE — 82948 REAGENT STRIP/BLOOD GLUCOSE: CPT

## 2021-09-10 PROCEDURE — 86920 COMPATIBILITY TEST SPIN: CPT

## 2021-09-10 PROCEDURE — 83735 ASSAY OF MAGNESIUM: CPT

## 2021-09-10 PROCEDURE — 87077 CULTURE AEROBIC IDENTIFY: CPT

## 2021-09-10 PROCEDURE — 86901 BLOOD TYPING SEROLOGIC RH(D): CPT

## 2021-09-10 PROCEDURE — 83605 ASSAY OF LACTIC ACID: CPT

## 2021-09-10 PROCEDURE — 84478 ASSAY OF TRIGLYCERIDES: CPT

## 2021-09-10 PROCEDURE — 36415 COLL VENOUS BLD VENIPUNCTURE: CPT

## 2021-09-10 PROCEDURE — 85007 BL SMEAR W/DIFF WBC COUNT: CPT

## 2021-09-10 PROCEDURE — 97110 THERAPEUTIC EXERCISES: CPT

## 2021-09-10 PROCEDURE — 84134 ASSAY OF PREALBUMIN: CPT

## 2021-09-10 PROCEDURE — 83550 IRON BINDING TEST: CPT

## 2021-09-10 PROCEDURE — 81001 URINALYSIS AUTO W/SCOPE: CPT

## 2021-09-10 PROCEDURE — 97530 THERAPEUTIC ACTIVITIES: CPT

## 2021-09-10 PROCEDURE — 97161 PT EVAL LOW COMPLEX 20 MIN: CPT

## 2021-09-10 PROCEDURE — 30233N1 TRANSFUSION OF NONAUTOLOGOUS RED BLOOD CELLS INTO PERIPHERAL VEIN, PERCUTANEOUS APPROACH: ICD-10-PCS | Performed by: INTERNAL MEDICINE

## 2021-09-10 PROCEDURE — 99285 EMERGENCY DEPT VISIT HI MDM: CPT

## 2021-09-10 RX ADMIN — DOCUSATE SODIUM SCH MG: 100 CAPSULE, LIQUID FILLED ORAL at 20:00

## 2021-09-10 NOTE — NUR
[place]. Medic states that patients blood 
pressure is 79/51 mmHg. Dr. Stewart aware of patient transfer from [facility]. Dr. Stewart requested that verbal order be placed for 1 L NS bolus once now and ER 
Sepsis protocol be placed on patient. Ok to use PICC line per Dr. Stewart.

## 2021-09-11 LAB
ANISOCYTOSIS BLD QL SMEAR: (no result)
BASOPHILS # BLD AUTO: 0 X10'3 (ref 0–0.2)
BASOPHILS NFR BLD AUTO: 0.5 % (ref 0–1)
BURR CELLS BLD QL SMEAR: (no result)
EOSINOPHIL # BLD AUTO: 0.3 X10'3 (ref 0–0.9)
EOSINOPHIL NFR BLD AUTO: 3.6 % (ref 0–6)
EOSINOPHIL NFR BLD MANUAL: 3 % (ref 0–6)
ERYTHROCYTE [DISTWIDTH] IN BLOOD BY AUTOMATED COUNT: 18.2 % (ref 11.5–14.5)
HCT VFR BLD AUTO: 24.6 % (ref 42–52)
HGB BLD-MCNC: 8.4 G/DL (ref 14–17.9)
IRON SATN MFR SERPL: 108 % (ref 11–46)
IRON SATN MFR SERPL: 59 UG/DL (ref 259–388)
IRON SERPL-MCNC: 64 UG/DL (ref 53–167)
LYMPHOCYTES # BLD AUTO: 1.2 X10'3 (ref 1.1–4.8)
LYMPHOCYTES NFR BLD AUTO: 13.4 % (ref 21–51)
LYMPHOCYTES NFR BLD MANUAL: 9 % (ref 21–51)
MCH RBC QN AUTO: 30.6 PG (ref 27–31)
MCHC RBC AUTO-ENTMCNC: 34 G/DL (ref 33–36.5)
MCV RBC AUTO: 89.9 FL (ref 78–98)
MONOCYTES # BLD AUTO: 1.6 X10'3 (ref 0–0.9)
MONOCYTES NFR BLD AUTO: 17.9 % (ref 2–12)
MONOCYTES NFR BLD MANUAL: 10 % (ref 2–12)
NEUTROPHILS # BLD AUTO: 5.9 X10'3 (ref 1.8–7.7)
NEUTROPHILS NFR BLD AUTO: 64.6 % (ref 42–75)
NEUTS SEG NFR BLD MANUAL: 78 % (ref 42–75)
PLATELET # BLD AUTO: 158 X10'3 (ref 140–440)
PLATELET BLD QL SMEAR: NORMAL
PMV BLD AUTO: 7.3 FL (ref 7.4–10.4)
POLYCHROMASIA BLD QL SMEAR: (no result)
RBC # BLD AUTO: 2.74 X10'6 (ref 4.7–6.1)
RBC MORPH BLD: (no result)
TOTAL CELLS COUNTED FLD: 100
WBC # BLD AUTO: 9.1 X10'3 (ref 4.5–11)

## 2021-09-11 RX ADMIN — HYDROCODONE BITARTRATE AND ACETAMINOPHEN PRN TAB: 5; 325 TABLET ORAL at 21:49

## 2021-09-11 RX ADMIN — MAGNESIUM SULFATE IN WATER PRN MLS/HR: 40 INJECTION, SOLUTION INTRAVENOUS at 07:40

## 2021-09-11 RX ADMIN — HYDROCODONE BITARTRATE AND ACETAMINOPHEN PRN TAB: 5; 325 TABLET ORAL at 00:59

## 2021-09-11 RX ADMIN — CEFTRIAXONE SCH MLS/HR: 2 INJECTION, SOLUTION INTRAVENOUS at 18:23

## 2021-09-11 RX ADMIN — CARVEDILOL SCH MG: 6.25 TABLET, FILM COATED ORAL at 19:31

## 2021-09-11 RX ADMIN — CARVEDILOL SCH MG: 6.25 TABLET, FILM COATED ORAL at 18:24

## 2021-09-11 RX ADMIN — DOCUSATE SODIUM SCH MG: 100 CAPSULE, LIQUID FILLED ORAL at 08:00

## 2021-09-11 NOTE — NUR
Patient requesting pain medication 2 hours after morphine given. Patient is NPO 
and no orders are clear on instructions for NG tube. Dr. Booker updated. Per MD 
no new orders to be placed for pain medication. MD needs to speak with surgery 
to determine plan of care.

## 2021-09-11 NOTE — NUR
OK, to give po meds via NGT.

-------------------------------------------------------------------------------

Addendum: 09/11/21 at 1933 by GIRISH

-------------------------------------------------------------------------------

OK, per Dr. Booker to give PO meds via NGT.

## 2021-09-11 NOTE — NUR
Pt was turned and cleaned.  Pt is oozing serious fluid from his sascha-area.  Pt 
has dependant edema.  Scrotum and penis have edema.

## 2021-09-12 VITALS — SYSTOLIC BLOOD PRESSURE: 135 MMHG | DIASTOLIC BLOOD PRESSURE: 68 MMHG

## 2021-09-12 VITALS — SYSTOLIC BLOOD PRESSURE: 165 MMHG | DIASTOLIC BLOOD PRESSURE: 62 MMHG

## 2021-09-12 VITALS — DIASTOLIC BLOOD PRESSURE: 65 MMHG | SYSTOLIC BLOOD PRESSURE: 130 MMHG

## 2021-09-12 VITALS — DIASTOLIC BLOOD PRESSURE: 72 MMHG | SYSTOLIC BLOOD PRESSURE: 148 MMHG

## 2021-09-12 LAB
ALBUMIN SERPL BCP-MCNC: 1.1 G/DL (ref 3.4–5)
ALBUMIN/GLOB SERPL: 0.3 {RATIO} (ref 1.1–1.5)
ALP SERPL-CCNC: 227 IU/L (ref 46–116)
ALT SERPL W P-5'-P-CCNC: 13 U/L (ref 12–78)
ANION GAP SERPL CALCULATED.3IONS-SCNC: 6 MMOL/L (ref 8–16)
AST SERPL W P-5'-P-CCNC: 23 U/L (ref 10–37)
BASOPHILS # BLD AUTO: 0 X10'3 (ref 0–0.2)
BASOPHILS NFR BLD AUTO: 0.3 % (ref 0–1)
BILIRUB SERPL-MCNC: 0.3 MG/DL (ref 0.1–1)
BUN SERPL-MCNC: 3 MG/DL (ref 7–18)
BUN/CREAT SERPL: 6.3 (ref 5.4–32)
CALCIUM SERPL-MCNC: 7.2 MG/DL (ref 8.5–10.1)
CHLORIDE SERPL-SCNC: 104 MMOL/L (ref 99–107)
CO2 SERPL-SCNC: 25.3 MMOL/L (ref 24–32)
CREAT SERPL-MCNC: 0.48 MG/DL (ref 0.6–1.1)
EOSINOPHIL # BLD AUTO: 0.1 X10'3 (ref 0–0.9)
EOSINOPHIL NFR BLD AUTO: 1.3 % (ref 0–6)
ERYTHROCYTE [DISTWIDTH] IN BLOOD BY AUTOMATED COUNT: 17.4 % (ref 11.5–14.5)
GFR SERPL CREATININE-BSD FRML MDRD: > 90 ML/MIN
GLUCOSE SERPL-MCNC: 110 MG/DL (ref 70–104)
HCT VFR BLD AUTO: 27.4 % (ref 42–52)
HGB BLD-MCNC: 9.3 G/DL (ref 14–17.9)
LYMPHOCYTES # BLD AUTO: 1.4 X10'3 (ref 1.1–4.8)
LYMPHOCYTES NFR BLD AUTO: 16.2 % (ref 21–51)
MAGNESIUM SERPL-MCNC: 1.6 MG/DL (ref 1.5–2.4)
MCH RBC QN AUTO: 30.4 PG (ref 27–31)
MCHC RBC AUTO-ENTMCNC: 34.1 G/DL (ref 33–36.5)
MCV RBC AUTO: 89.3 FL (ref 78–98)
MONOCYTES # BLD AUTO: 1.7 X10'3 (ref 0–0.9)
MONOCYTES NFR BLD AUTO: 19.2 % (ref 2–12)
NEUTROPHILS # BLD AUTO: 5.5 X10'3 (ref 1.8–7.7)
NEUTROPHILS NFR BLD AUTO: 63 % (ref 42–75)
PHOSPHATE SERPL-MCNC: 3.6 MG/DL (ref 2.3–4.5)
PLATELET # BLD AUTO: 168 X10'3 (ref 140–440)
PMV BLD AUTO: 8 FL (ref 7.4–10.4)
POTASSIUM SERPL-SCNC: 3.2 MMOL/L (ref 3.5–5.1)
PREALB SERPL-MCNC: 7.7 MG/DL (ref 19–36)
PROT SERPL-MCNC: 4.8 G/DL (ref 6.4–8.2)
RBC # BLD AUTO: 3.07 X10'6 (ref 4.7–6.1)
SODIUM SERPL-SCNC: 135 MMOL/L (ref 135–145)
TRIGL SERPL-MCNC: 72 MG/DL (ref 20–135)
WBC # BLD AUTO: 8.7 X10'3 (ref 4.5–11)

## 2021-09-12 RX ADMIN — DOCUSATE SODIUM SCH MG: 100 CAPSULE, LIQUID FILLED ORAL at 20:00

## 2021-09-12 RX ADMIN — CARVEDILOL SCH MG: 6.25 TABLET, FILM COATED ORAL at 12:51

## 2021-09-12 RX ADMIN — DIATRIZOATE MEGLUMINE AND DIATRIZOATE SODIUM SCH ML: 660; 100 LIQUID ORAL; RECTAL at 21:00

## 2021-09-12 RX ADMIN — SOYBEAN OIL SCH MLS/HR: 20 INJECTION, SOLUTION INTRAVENOUS at 20:35

## 2021-09-12 RX ADMIN — DOCUSATE SODIUM SCH MG: 100 CAPSULE, LIQUID FILLED ORAL at 12:52

## 2021-09-12 RX ADMIN — HYDROCODONE BITARTRATE AND ACETAMINOPHEN PRN TAB: 10; 325 TABLET ORAL at 16:32

## 2021-09-12 RX ADMIN — CEFTRIAXONE SCH MLS/HR: 2 INJECTION, SOLUTION INTRAVENOUS at 13:21

## 2021-09-12 RX ADMIN — HYDROCODONE BITARTRATE AND ACETAMINOPHEN PRN TAB: 10; 325 TABLET ORAL at 23:51

## 2021-09-12 RX ADMIN — DOCUSATE SODIUM SCH MG: 100 CAPSULE, LIQUID FILLED ORAL at 08:00

## 2021-09-12 RX ADMIN — CARVEDILOL SCH MG: 6.25 TABLET, FILM COATED ORAL at 20:00

## 2021-09-12 NOTE — NUR
page for higher dose pain med

PAGER ID:  [phone] 

MESSAGE:  room [id number] Emmanuel Selby patient has Norco 5 and reports it not working. May I 
order him Norco 10? patient states not wanting morphine because it does not work for a long 
enough duration. thank you, Petrona MUJICA

## 2021-09-12 NOTE — NUR
PAGER ID:  [phone] 

MESSAGE:  ENRIQUE ON [email or web address], [phone] MR. SHORT WAS APPARENTLY ON TPN AT [facility] DO WE WANT A 
NUTRITIONAL CONSULT AND TPN TO CONTINUE?

## 2021-09-12 NOTE — NUR
TPN consult: Pt admitted w/ SBO and post-op complications. Pt has 

undergone exploratory laparotomy, enterolysis, pelvic abscess drainage, 

partial colectomy, and creation of a colostomy on 8/7 per ED note. Pt has 

been at [facility] since 9/3 and was receiving TPN while there. Pt 

remains NPO w/ NGT for suctioning, though no output per RN. Pt w/ PICC 

line and TPN likely to start tomorrow per RN, recs below. Unable to obtain 

wt hx or prior PO intake from pt, though visible muscle waisting of 

temples observed at bedside. Pending physical assessment. Unable to fully 

assess pt for malnutrition d/t limited information. Will continue to 

monitor.

Recs:

TPN per MD via PICC using 2:1 Clinimix E 5/15 at 70ml/hr goal 

w/ separate 100ml 20% intralipids to run for 12 hours daily at 20.83ml/hr. 

In total; to provide 1680ml volume, 84g AA, 252g DEX (2.50mg/kg/min), and 

1692 total kcals. Initiate at 30ml/hr and if tolerated Q12H advance to 

goal rate.

2. TG/PALB Q M/Th; daily wts

3. monitor for PN adjustment needs as medically indicated

4. bowel care per rx

-------------------------------------------------------------------------------

Addendum: 09/12/21 at 1526 by Francisco Rivera RD

-------------------------------------------------------------------------------

Amended: Links added.

## 2021-09-13 VITALS — SYSTOLIC BLOOD PRESSURE: 116 MMHG | DIASTOLIC BLOOD PRESSURE: 61 MMHG

## 2021-09-13 VITALS — SYSTOLIC BLOOD PRESSURE: 114 MMHG | DIASTOLIC BLOOD PRESSURE: 48 MMHG

## 2021-09-13 VITALS — SYSTOLIC BLOOD PRESSURE: 120 MMHG | DIASTOLIC BLOOD PRESSURE: 62 MMHG

## 2021-09-13 VITALS — DIASTOLIC BLOOD PRESSURE: 64 MMHG | SYSTOLIC BLOOD PRESSURE: 124 MMHG

## 2021-09-13 VITALS — DIASTOLIC BLOOD PRESSURE: 53 MMHG | SYSTOLIC BLOOD PRESSURE: 121 MMHG

## 2021-09-13 VITALS — SYSTOLIC BLOOD PRESSURE: 99 MMHG | DIASTOLIC BLOOD PRESSURE: 44 MMHG

## 2021-09-13 LAB
ALBUMIN SERPL BCP-MCNC: 1 G/DL (ref 3.4–5)
ALBUMIN/GLOB SERPL: 0.3 {RATIO} (ref 1.1–1.5)
ALP SERPL-CCNC: 198 IU/L (ref 46–116)
ALT SERPL W P-5'-P-CCNC: 9 U/L (ref 12–78)
ANION GAP SERPL CALCULATED.3IONS-SCNC: 7 MMOL/L (ref 8–16)
AST SERPL W P-5'-P-CCNC: 24 U/L (ref 10–37)
BASOPHILS # BLD AUTO: 0 X10'3 (ref 0–0.2)
BASOPHILS NFR BLD AUTO: 0.4 % (ref 0–1)
BILIRUB SERPL-MCNC: 0.1 MG/DL (ref 0.1–1)
BUN SERPL-MCNC: 2 MG/DL (ref 7–18)
BUN/CREAT SERPL: 4 (ref 5.4–32)
CALCIUM SERPL-MCNC: 7.1 MG/DL (ref 8.5–10.1)
CHLORIDE SERPL-SCNC: 105 MMOL/L (ref 99–107)
CO2 SERPL-SCNC: 24.1 MMOL/L (ref 24–32)
CREAT SERPL-MCNC: 0.5 MG/DL (ref 0.6–1.1)
EOSINOPHIL # BLD AUTO: 0.1 X10'3 (ref 0–0.9)
EOSINOPHIL NFR BLD AUTO: 1.3 % (ref 0–6)
ERYTHROCYTE [DISTWIDTH] IN BLOOD BY AUTOMATED COUNT: 17.4 % (ref 11.5–14.5)
GFR SERPL CREATININE-BSD FRML MDRD: > 90 ML/MIN
GLUCOSE SERPL-MCNC: 119 MG/DL (ref 70–104)
HCT VFR BLD AUTO: 24.8 % (ref 42–52)
HGB BLD-MCNC: 8.2 G/DL (ref 14–17.9)
LYMPHOCYTES # BLD AUTO: 1.4 X10'3 (ref 1.1–4.8)
LYMPHOCYTES NFR BLD AUTO: 17.7 % (ref 21–51)
MAGNESIUM SERPL-MCNC: 1.4 MG/DL (ref 1.5–2.4)
MCH RBC QN AUTO: 29.8 PG (ref 27–31)
MCHC RBC AUTO-ENTMCNC: 33.1 G/DL (ref 33–36.5)
MCV RBC AUTO: 89.9 FL (ref 78–98)
MONOCYTES # BLD AUTO: 1.9 X10'3 (ref 0–0.9)
MONOCYTES NFR BLD AUTO: 23.6 % (ref 2–12)
NEUTROPHILS # BLD AUTO: 4.5 X10'3 (ref 1.8–7.7)
NEUTROPHILS NFR BLD AUTO: 57 % (ref 42–75)
PHOSPHATE SERPL-MCNC: 3.7 MG/DL (ref 2.3–4.5)
PLATELET # BLD AUTO: 174 X10'3 (ref 140–440)
PMV BLD AUTO: 8.5 FL (ref 7.4–10.4)
POTASSIUM SERPL-SCNC: 3.1 MMOL/L (ref 3.5–5.1)
PREALB SERPL-MCNC: 8.8 MG/DL (ref 19–36)
PROT SERPL-MCNC: 4.5 G/DL (ref 6.4–8.2)
RBC # BLD AUTO: 2.75 X10'6 (ref 4.7–6.1)
SODIUM SERPL-SCNC: 136 MMOL/L (ref 135–145)
TRIGL SERPL-MCNC: 57 MG/DL (ref 20–135)
WBC # BLD AUTO: 8 X10'3 (ref 4.5–11)

## 2021-09-13 RX ADMIN — DOCUSATE SODIUM SCH MG: 100 CAPSULE, LIQUID FILLED ORAL at 20:00

## 2021-09-13 RX ADMIN — DIATRIZOATE MEGLUMINE AND DIATRIZOATE SODIUM SCH ML: 660; 100 LIQUID ORAL; RECTAL at 07:56

## 2021-09-13 RX ADMIN — CEFTRIAXONE SCH MLS/HR: 2 INJECTION, SOLUTION INTRAVENOUS at 07:57

## 2021-09-13 RX ADMIN — MAGNESIUM SULFATE IN WATER PRN MLS/HR: 40 INJECTION, SOLUTION INTRAVENOUS at 17:03

## 2021-09-13 RX ADMIN — CARVEDILOL SCH MG: 6.25 TABLET, FILM COATED ORAL at 08:46

## 2021-09-13 RX ADMIN — SOYBEAN OIL SCH MLS/HR: 20 INJECTION, SOLUTION INTRAVENOUS at 21:27

## 2021-09-13 RX ADMIN — DIATRIZOATE MEGLUMINE AND DIATRIZOATE SODIUM SCH ML: 660; 100 LIQUID ORAL; RECTAL at 12:05

## 2021-09-13 RX ADMIN — LEUCINE, PHENYLALANINE, LYSINE, METHIONINE, ISOLEUCINE, VALINE, HISTIDINE, THREONINE, TRYPTOPHAN, ALANINE, GLYCINE, ARGININE, PROLINE, SERINE, TYROSINE, SODIUM ACETATE, DIBASIC POTASSIUM PHOSPHATE, MAGNESIUM CHLORIDE, SODIUM CHLORIDE, CALCIUM CHLORIDE, DEXTROSE SCH MLS/HR
311; 238; 247; 170; 255; 247; 204; 179; 77; 880; 438; 489; 289; 213; 17; 297; 261; 51; 77; 33; 10 INJECTION INTRAVENOUS at 21:39

## 2021-09-13 RX ADMIN — DOCUSATE SODIUM SCH MG: 100 CAPSULE, LIQUID FILLED ORAL at 08:00

## 2021-09-13 RX ADMIN — CARVEDILOL SCH MG: 6.25 TABLET, FILM COATED ORAL at 23:30

## 2021-09-13 NOTE — NUR
F/u 9/13: Pt states he may have lost 5lb in the last month because he was "NPO at [facility]" though unsure of the exact amount. Pt refused full NFPE, per physical assessment 
BLER 2+ edema. Pt has been receiving TPN for the last week at [facility] and is currently on TPN. 
Given Pt's nutritional needs being met via TPN prior to admit, pt does not meet minimum 
criteria for malnutrition at this time.

-------------------------------------------------------------------------------

Addendum: 09/13/21 at 1134 by Francisco Rivera RD

-------------------------------------------------------------------------------

Amended: Links added.

## 2021-09-13 NOTE — NUR
Patient in room PCU 3023. I have received report from  Petrona MUJICA  and had the opportunity 
to ask questions and assume patient care.

## 2021-09-14 VITALS — SYSTOLIC BLOOD PRESSURE: 118 MMHG | DIASTOLIC BLOOD PRESSURE: 63 MMHG

## 2021-09-14 VITALS — SYSTOLIC BLOOD PRESSURE: 138 MMHG | DIASTOLIC BLOOD PRESSURE: 73 MMHG

## 2021-09-14 VITALS — DIASTOLIC BLOOD PRESSURE: 69 MMHG | SYSTOLIC BLOOD PRESSURE: 117 MMHG

## 2021-09-14 VITALS — SYSTOLIC BLOOD PRESSURE: 153 MMHG | DIASTOLIC BLOOD PRESSURE: 75 MMHG

## 2021-09-14 VITALS — SYSTOLIC BLOOD PRESSURE: 108 MMHG | DIASTOLIC BLOOD PRESSURE: 61 MMHG

## 2021-09-14 VITALS — SYSTOLIC BLOOD PRESSURE: 118 MMHG | DIASTOLIC BLOOD PRESSURE: 60 MMHG

## 2021-09-14 LAB
ALBUMIN SERPL BCP-MCNC: 1 G/DL (ref 3.4–5)
ALBUMIN/GLOB SERPL: 0.3 {RATIO} (ref 1.1–1.5)
ALP SERPL-CCNC: 188 IU/L (ref 46–116)
ALT SERPL W P-5'-P-CCNC: 9 U/L (ref 12–78)
ANION GAP SERPL CALCULATED.3IONS-SCNC: 7 MMOL/L (ref 8–16)
ANISOCYTOSIS BLD QL SMEAR: (no result)
AST SERPL W P-5'-P-CCNC: 19 U/L (ref 10–37)
BASOPHILS # BLD AUTO: 0 X10'3 (ref 0–0.2)
BASOPHILS NFR BLD AUTO: 0.2 % (ref 0–1)
BILIRUB SERPL-MCNC: 0.1 MG/DL (ref 0.1–1)
BUN SERPL-MCNC: 3 MG/DL (ref 7–18)
BUN/CREAT SERPL: 6.5 (ref 5.4–32)
BURR CELLS BLD QL SMEAR: (no result)
CALCIUM SERPL-MCNC: 7.1 MG/DL (ref 8.5–10.1)
CHLORIDE SERPL-SCNC: 108 MMOL/L (ref 99–107)
CO2 SERPL-SCNC: 25.2 MMOL/L (ref 24–32)
CREAT SERPL-MCNC: 0.46 MG/DL (ref 0.6–1.1)
EOSINOPHIL # BLD AUTO: 0.1 X10'3 (ref 0–0.9)
EOSINOPHIL NFR BLD AUTO: 1.5 % (ref 0–6)
EOSINOPHIL NFR BLD MANUAL: 1 % (ref 0–6)
ERYTHROCYTE [DISTWIDTH] IN BLOOD BY AUTOMATED COUNT: 18 % (ref 11.5–14.5)
GFR SERPL CREATININE-BSD FRML MDRD: > 90 ML/MIN
GLUCOSE SERPL-MCNC: 108 MG/DL (ref 70–104)
HCT VFR BLD AUTO: 24.7 % (ref 42–52)
HGB BLD-MCNC: 8.2 G/DL (ref 14–17.9)
LYMPHOCYTES # BLD AUTO: 1.6 X10'3 (ref 1.1–4.8)
LYMPHOCYTES NFR BLD AUTO: 18.2 % (ref 21–51)
LYMPHOCYTES NFR BLD MANUAL: 18 % (ref 21–51)
MAGNESIUM SERPL-MCNC: 1.9 MG/DL (ref 1.5–2.4)
MCH RBC QN AUTO: 30.4 PG (ref 27–31)
MCHC RBC AUTO-ENTMCNC: 33.1 G/DL (ref 33–36.5)
MCV RBC AUTO: 91.8 FL (ref 78–98)
MONOCYTES # BLD AUTO: 2.3 X10'3 (ref 0–0.9)
MONOCYTES NFR BLD AUTO: 26.4 % (ref 2–12)
MONOCYTES NFR BLD MANUAL: 24 % (ref 2–12)
NEUTROPHILS # BLD AUTO: 4.6 X10'3 (ref 1.8–7.7)
NEUTROPHILS NFR BLD AUTO: 53.7 % (ref 42–75)
NEUTS SEG NFR BLD MANUAL: 57 % (ref 42–75)
PHOSPHATE SERPL-MCNC: 3.6 MG/DL (ref 2.3–4.5)
PLATELET # BLD AUTO: 173 X10'3 (ref 140–440)
PLATELET BLD QL SMEAR: NORMAL
PMV BLD AUTO: 9 FL (ref 7.4–10.4)
POTASSIUM SERPL-SCNC: 3.5 MMOL/L (ref 3.5–5.1)
PROT SERPL-MCNC: 4.5 G/DL (ref 6.4–8.2)
RBC # BLD AUTO: 2.7 X10'6 (ref 4.7–6.1)
RBC MORPH BLD: (no result)
SCHISTOCYTES BLD QL SMEAR: (no result)
SODIUM SERPL-SCNC: 140 MMOL/L (ref 135–145)
TOTAL CELLS COUNTED FLD: 100
WBC # BLD AUTO: 8.6 X10'3 (ref 4.5–11)

## 2021-09-14 RX ADMIN — DOCUSATE SODIUM SCH MG: 100 CAPSULE, LIQUID FILLED ORAL at 19:45

## 2021-09-14 RX ADMIN — CEFTRIAXONE SCH MLS/HR: 2 INJECTION, SOLUTION INTRAVENOUS at 08:26

## 2021-09-14 RX ADMIN — SOYBEAN OIL SCH MLS/HR: 20 INJECTION, SOLUTION INTRAVENOUS at 19:45

## 2021-09-14 RX ADMIN — LEUCINE, PHENYLALANINE, LYSINE, METHIONINE, ISOLEUCINE, VALINE, HISTIDINE, THREONINE, TRYPTOPHAN, ALANINE, GLYCINE, ARGININE, PROLINE, SERINE, TYROSINE, SODIUM ACETATE, DIBASIC POTASSIUM PHOSPHATE, MAGNESIUM CHLORIDE, SODIUM CHLORIDE, CALCIUM CHLORIDE, DEXTROSE SCH MLS/HR
311; 238; 247; 170; 255; 247; 204; 179; 77; 880; 438; 489; 289; 213; 17; 297; 261; 51; 77; 33; 10 INJECTION INTRAVENOUS at 11:38

## 2021-09-14 RX ADMIN — CARVEDILOL SCH MG: 6.25 TABLET, FILM COATED ORAL at 11:36

## 2021-09-14 RX ADMIN — ONDANSETRON PRN MG: 2 INJECTION, SOLUTION INTRAMUSCULAR; INTRAVENOUS at 11:37

## 2021-09-14 RX ADMIN — DOCUSATE SODIUM SCH MG: 100 CAPSULE, LIQUID FILLED ORAL at 08:46

## 2021-09-14 RX ADMIN — CARVEDILOL SCH MG: 6.25 TABLET, FILM COATED ORAL at 19:45

## 2021-09-14 RX ADMIN — ONDANSETRON PRN MG: 2 INJECTION, SOLUTION INTRAMUSCULAR; INTRAVENOUS at 19:49

## 2021-09-14 NOTE — NUR
Dr. Meraz paged:



Emmanuel Weinstein [id number]: NG tube plugged. is it OK to D/C or would you like a new one 
placed? [phone]

## 2021-09-14 NOTE — NUR
Problems reprioritized. Patient report given, questions answered & plan of care reviewed 
with Maia MUJICA.

## 2021-09-14 NOTE — NUR
Patient in room PCU 3023C. I have received report from  GUANAKO Atkinson  and had the opportunity to 
ask questions and assume patient care.

## 2021-09-14 NOTE — NUR
ATTEMPTED TO GET A WEIGHT ON STANDING SCALE, PT SAYS THEY DO NOT FEEL COMFORTABLE STANDING. 
BED SCALE DOES NOT WORK.

## 2021-09-15 VITALS — SYSTOLIC BLOOD PRESSURE: 112 MMHG | DIASTOLIC BLOOD PRESSURE: 67 MMHG

## 2021-09-15 VITALS — DIASTOLIC BLOOD PRESSURE: 66 MMHG | SYSTOLIC BLOOD PRESSURE: 140 MMHG

## 2021-09-15 VITALS — SYSTOLIC BLOOD PRESSURE: 107 MMHG | DIASTOLIC BLOOD PRESSURE: 58 MMHG

## 2021-09-15 VITALS — DIASTOLIC BLOOD PRESSURE: 57 MMHG | SYSTOLIC BLOOD PRESSURE: 113 MMHG

## 2021-09-15 VITALS — DIASTOLIC BLOOD PRESSURE: 65 MMHG | SYSTOLIC BLOOD PRESSURE: 116 MMHG

## 2021-09-15 VITALS — DIASTOLIC BLOOD PRESSURE: 64 MMHG | SYSTOLIC BLOOD PRESSURE: 111 MMHG

## 2021-09-15 LAB
ALBUMIN SERPL BCP-MCNC: 1.1 G/DL (ref 3.4–5)
ALBUMIN/GLOB SERPL: 0.3 {RATIO} (ref 1.1–1.5)
ALP SERPL-CCNC: 180 IU/L (ref 46–116)
ALT SERPL W P-5'-P-CCNC: 10 U/L (ref 12–78)
ANION GAP SERPL CALCULATED.3IONS-SCNC: 5 MMOL/L (ref 8–16)
ANISOCYTOSIS BLD QL SMEAR: (no result)
AST SERPL W P-5'-P-CCNC: 22 U/L (ref 10–37)
BACTERIA URNS QL MICRO: (no result) /HPF
BASOPHILS # BLD AUTO: 0.1 X10'3 (ref 0–0.2)
BASOPHILS NFR BLD AUTO: 0.6 % (ref 0–1)
BILIRUB SERPL-MCNC: 0.2 MG/DL (ref 0.1–1)
BUN SERPL-MCNC: 5 MG/DL (ref 7–18)
BUN/CREAT SERPL: 12.2 (ref 5.4–32)
CALCIUM SERPL-MCNC: 7.3 MG/DL (ref 8.5–10.1)
CHLORIDE SERPL-SCNC: 106 MMOL/L (ref 99–107)
CLARITY UR: CLEAR
CO2 SERPL-SCNC: 26.1 MMOL/L (ref 24–32)
COLOR UR: (no result)
CREAT SERPL-MCNC: 0.41 MG/DL (ref 0.6–1.1)
DEPRECATED SQUAMOUS URNS QL MICRO: (no result) /LPF
EOSINOPHIL # BLD AUTO: 0.3 X10'3 (ref 0–0.9)
EOSINOPHIL NFR BLD AUTO: 2.6 % (ref 0–6)
ERYTHROCYTE [DISTWIDTH] IN BLOOD BY AUTOMATED COUNT: 18 % (ref 11.5–14.5)
GFR SERPL CREATININE-BSD FRML MDRD: > 90 ML/MIN
GLUCOSE SERPL-MCNC: 109 MG/DL (ref 70–104)
GLUCOSE UR STRIP-MCNC: NEGATIVE MG/DL
HCT VFR BLD AUTO: 24.5 % (ref 42–52)
HGB BLD-MCNC: 7.8 G/DL (ref 14–17.9)
HGB UR QL STRIP: NEGATIVE
KETONES UR STRIP-MCNC: NEGATIVE MG/DL
LEUKOCYTE ESTERASE UR QL STRIP: (no result)
LYMPHOCYTES # BLD AUTO: 1.7 X10'3 (ref 1.1–4.8)
LYMPHOCYTES NFR BLD AUTO: 15.1 % (ref 21–51)
LYMPHOCYTES NFR BLD MANUAL: 6 % (ref 21–51)
MAGNESIUM SERPL-MCNC: 1.5 MG/DL (ref 1.5–2.4)
MCH RBC QN AUTO: 29.3 PG (ref 27–31)
MCHC RBC AUTO-ENTMCNC: 31.9 G/DL (ref 33–36.5)
MCV RBC AUTO: 92 FL (ref 78–98)
METAMYELOCYTES NFR BLD MANUAL: 2 % (ref 0–0)
MONOCYTES # BLD AUTO: 2.1 X10'3 (ref 0–0.9)
MONOCYTES NFR BLD AUTO: 19.4 % (ref 2–12)
MONOCYTES NFR BLD MANUAL: 13 % (ref 2–12)
MUCOUS THREADS URNS QL MICRO: (no result) /LPF
NEUTROPHILS # BLD AUTO: 6.9 X10'3 (ref 1.8–7.7)
NEUTROPHILS NFR BLD AUTO: 62.3 % (ref 42–75)
NEUTS BAND # BLD MANUAL: 1 % (ref 0–10)
NEUTS SEG NFR BLD MANUAL: 78 % (ref 42–75)
NITRITE UR QL STRIP: NEGATIVE
PH UR STRIP: 6 [PH] (ref 4.8–8)
PLATELET # BLD AUTO: 236 X10'3 (ref 140–440)
PLATELET BLD QL SMEAR: NORMAL
PMV BLD AUTO: 9.2 FL (ref 7.4–10.4)
POTASSIUM SERPL-SCNC: 3.6 MMOL/L (ref 3.5–5.1)
PROT SERPL-MCNC: 4.7 G/DL (ref 6.4–8.2)
PROT UR QL STRIP: NEGATIVE MG/DL
RBC # BLD AUTO: 2.66 X10'6 (ref 4.7–6.1)
RBC #/AREA URNS HPF: (no result) /HPF (ref 0–2)
RBC MORPH BLD: (no result)
SODIUM SERPL-SCNC: 137 MMOL/L (ref 135–145)
SP GR UR STRIP: 1 (ref 1–1.03)
TOTAL CELLS COUNTED FLD: 100
URN COLLECT METHOD CLASS: (no result)
UROBILINOGEN UR STRIP-MCNC: 0.2 E.U/DL (ref 0.2–1)
WBC # BLD AUTO: 11 X10'3 (ref 4.5–11)
WBC #/AREA URNS HPF: (no result) /HPF (ref 0–4)
YEAST URNS QL MICRO: (no result) /HPF

## 2021-09-15 RX ADMIN — CEFTRIAXONE SCH MLS/HR: 2 INJECTION, SOLUTION INTRAVENOUS at 08:59

## 2021-09-15 RX ADMIN — CARVEDILOL SCH MG: 6.25 TABLET, FILM COATED ORAL at 09:04

## 2021-09-15 RX ADMIN — SOYBEAN OIL SCH MLS/HR: 20 INJECTION, SOLUTION INTRAVENOUS at 20:09

## 2021-09-15 RX ADMIN — LEUCINE, PHENYLALANINE, LYSINE, METHIONINE, ISOLEUCINE, VALINE, HISTIDINE, THREONINE, TRYPTOPHAN, ALANINE, GLYCINE, ARGININE, PROLINE, SERINE, TYROSINE, SODIUM ACETATE, DIBASIC POTASSIUM PHOSPHATE, MAGNESIUM CHLORIDE, SODIUM CHLORIDE, CALCIUM CHLORIDE, DEXTROSE SCH MLS/HR
311; 238; 247; 170; 255; 247; 204; 179; 77; 880; 438; 489; 289; 213; 17; 297; 261; 51; 77; 33; 10 INJECTION INTRAVENOUS at 14:44

## 2021-09-15 RX ADMIN — DOCUSATE SODIUM SCH MG: 100 CAPSULE, LIQUID FILLED ORAL at 20:00

## 2021-09-15 RX ADMIN — LEUCINE, PHENYLALANINE, LYSINE, METHIONINE, ISOLEUCINE, VALINE, HISTIDINE, THREONINE, TRYPTOPHAN, ALANINE, GLYCINE, ARGININE, PROLINE, SERINE, TYROSINE, SODIUM ACETATE, DIBASIC POTASSIUM PHOSPHATE, MAGNESIUM CHLORIDE, SODIUM CHLORIDE, CALCIUM CHLORIDE, DEXTROSE SCH MLS/HR
311; 238; 247; 170; 255; 247; 204; 179; 77; 880; 438; 489; 289; 213; 17; 297; 261; 51; 77; 33; 10 INJECTION INTRAVENOUS at 01:57

## 2021-09-15 RX ADMIN — CARVEDILOL SCH MG: 6.25 TABLET, FILM COATED ORAL at 20:09

## 2021-09-15 RX ADMIN — DOCUSATE SODIUM SCH MG: 100 CAPSULE, LIQUID FILLED ORAL at 09:05

## 2021-09-15 NOTE — NUR
Reassessment: Per MD note surgeon assessed that the patient likely does 

not have a bowel obstruction any longer. New orders in place in EMR to 

remove NG tube and PO diet has been advanced to clear liquids, pending 

first meal since diet advancement. Per bedside RN pt to continue with TPN 

in conjunction to PO diet. D/w RN recommendation to continue nutrition 

support until adequate PO intake can be assured with PO diet advancement. 

Noted pt continues receiving dext/NS at 100 mL/hr, recommend discontinuing 

given pt receiving TPN. LBM 9/14, documented with 150 mL stool output with 

sufficient stool output previous days per I&O. Will continue to follow closely.

Recs:

1. TPN per MD via PICC using 2:1 Clinimix-E 5/15 at 70 mL/hr goal with 

separate 250 mL 20% intralipids to run for 12 hours daily at 20.83 mL/hr.

In total; to provide 1930 mL volume, 84 g AA, 252 g dextrose 

(2.50 mg/kg/min dext load), and 1692 total kcal. 

2. TG/PALB q M/Th; daily scaled wts

3. Discontinue dext/NS at 100 mL/hr given pt receiving TPN

4. Bowel care per rx

5. PO diet advancement to regular as medically indicated

6. Continue nutrition support until pt with stable PO intake averaging 65% 

PO intake of meals with diet advancement

7. Low tyramine nutrition therapy education once appropriate

-------------------------------------------------------------------------------

Addendum: 09/15/21 at 1215 by Mecca Pavon RD

-------------------------------------------------------------------------------

Amended: Links added.

## 2021-09-15 NOTE — NUR
Problems reprioritized. Patient report given, questions answered & plan of care reviewed 
with GUANAKO Atkinson.

## 2021-09-16 VITALS — SYSTOLIC BLOOD PRESSURE: 126 MMHG | DIASTOLIC BLOOD PRESSURE: 53 MMHG

## 2021-09-16 VITALS — DIASTOLIC BLOOD PRESSURE: 62 MMHG | SYSTOLIC BLOOD PRESSURE: 116 MMHG

## 2021-09-16 VITALS — DIASTOLIC BLOOD PRESSURE: 47 MMHG | SYSTOLIC BLOOD PRESSURE: 104 MMHG

## 2021-09-16 VITALS — SYSTOLIC BLOOD PRESSURE: 131 MMHG | DIASTOLIC BLOOD PRESSURE: 79 MMHG

## 2021-09-16 VITALS — SYSTOLIC BLOOD PRESSURE: 112 MMHG | DIASTOLIC BLOOD PRESSURE: 52 MMHG

## 2021-09-16 VITALS — SYSTOLIC BLOOD PRESSURE: 136 MMHG | DIASTOLIC BLOOD PRESSURE: 69 MMHG

## 2021-09-16 LAB
ALBUMIN SERPL BCP-MCNC: 1 G/DL (ref 3.4–5)
ALBUMIN/GLOB SERPL: 0.3 {RATIO} (ref 1.1–1.5)
ALP SERPL-CCNC: 170 IU/L (ref 46–116)
ALT SERPL W P-5'-P-CCNC: 16 U/L (ref 12–78)
ANION GAP SERPL CALCULATED.3IONS-SCNC: 8 MMOL/L (ref 8–16)
AST SERPL W P-5'-P-CCNC: 29 U/L (ref 10–37)
BASOPHILS # BLD AUTO: 0 X10'3 (ref 0–0.2)
BASOPHILS NFR BLD AUTO: 0.5 % (ref 0–1)
BILIRUB SERPL-MCNC: 0.1 MG/DL (ref 0.1–1)
BUN SERPL-MCNC: 7 MG/DL (ref 7–18)
BUN/CREAT SERPL: 16.7 (ref 5.4–32)
CALCIUM SERPL-MCNC: 7.3 MG/DL (ref 8.5–10.1)
CHLORIDE SERPL-SCNC: 106 MMOL/L (ref 99–107)
CO2 SERPL-SCNC: 24.3 MMOL/L (ref 24–32)
CREAT SERPL-MCNC: 0.42 MG/DL (ref 0.6–1.1)
EOSINOPHIL # BLD AUTO: 0.4 X10'3 (ref 0–0.9)
EOSINOPHIL NFR BLD AUTO: 3.7 % (ref 0–6)
ERYTHROCYTE [DISTWIDTH] IN BLOOD BY AUTOMATED COUNT: 18.3 % (ref 11.5–14.5)
GFR SERPL CREATININE-BSD FRML MDRD: > 90 ML/MIN
GLUCOSE SERPL-MCNC: 117 MG/DL (ref 70–104)
HCT VFR BLD AUTO: 23 % (ref 42–52)
HGB BLD-MCNC: 7.6 G/DL (ref 14–17.9)
LYMPHOCYTES # BLD AUTO: 1.8 X10'3 (ref 1.1–4.8)
LYMPHOCYTES NFR BLD AUTO: 18.8 % (ref 21–51)
MAGNESIUM SERPL-MCNC: 1.4 MG/DL (ref 1.5–2.4)
MCH RBC QN AUTO: 30.2 PG (ref 27–31)
MCHC RBC AUTO-ENTMCNC: 32.9 G/DL (ref 33–36.5)
MCV RBC AUTO: 91.9 FL (ref 78–98)
MONOCYTES # BLD AUTO: 1.8 X10'3 (ref 0–0.9)
MONOCYTES NFR BLD AUTO: 18.5 % (ref 2–12)
NEUTROPHILS # BLD AUTO: 5.6 X10'3 (ref 1.8–7.7)
NEUTROPHILS NFR BLD AUTO: 58.5 % (ref 42–75)
PHOSPHATE SERPL-MCNC: 3.7 MG/DL (ref 2.3–4.5)
PLATELET # BLD AUTO: 324 X10'3 (ref 140–440)
PMV BLD AUTO: 9 FL (ref 7.4–10.4)
POTASSIUM SERPL-SCNC: 3.6 MMOL/L (ref 3.5–5.1)
PREALB SERPL-MCNC: 11.4 MG/DL (ref 19–36)
PROT SERPL-MCNC: 4.8 G/DL (ref 6.4–8.2)
RBC # BLD AUTO: 2.51 X10'6 (ref 4.7–6.1)
SODIUM SERPL-SCNC: 138 MMOL/L (ref 135–145)
TRIGL SERPL-MCNC: 96 MG/DL (ref 20–135)
WBC # BLD AUTO: 9.6 X10'3 (ref 4.5–11)

## 2021-09-16 RX ADMIN — SOYBEAN OIL SCH MLS/HR: 20 INJECTION, SOLUTION INTRAVENOUS at 20:29

## 2021-09-16 RX ADMIN — HYDROMORPHONE HYDROCHLORIDE PRN MG: 1 INJECTION, SOLUTION INTRAMUSCULAR; INTRAVENOUS; SUBCUTANEOUS at 20:15

## 2021-09-16 RX ADMIN — LEUCINE, PHENYLALANINE, LYSINE, METHIONINE, ISOLEUCINE, VALINE, HISTIDINE, THREONINE, TRYPTOPHAN, ALANINE, GLYCINE, ARGININE, PROLINE, SERINE, TYROSINE, SODIUM ACETATE, DIBASIC POTASSIUM PHOSPHATE, MAGNESIUM CHLORIDE, SODIUM CHLORIDE, CALCIUM CHLORIDE, DEXTROSE SCH MLS/HR
311; 238; 247; 170; 255; 247; 204; 179; 77; 880; 438; 489; 289; 213; 17; 297; 261; 51; 77; 33; 10 INJECTION INTRAVENOUS at 16:45

## 2021-09-16 RX ADMIN — CARVEDILOL SCH MG: 6.25 TABLET, FILM COATED ORAL at 20:00

## 2021-09-16 RX ADMIN — DOCUSATE SODIUM SCH MG: 100 CAPSULE, LIQUID FILLED ORAL at 20:16

## 2021-09-16 RX ADMIN — CEFTRIAXONE SCH MLS/HR: 2 INJECTION, SOLUTION INTRAVENOUS at 07:44

## 2021-09-16 RX ADMIN — DOCUSATE SODIUM SCH MG: 100 CAPSULE, LIQUID FILLED ORAL at 07:41

## 2021-09-16 RX ADMIN — HYDROMORPHONE HYDROCHLORIDE PRN MG: 1 INJECTION, SOLUTION INTRAMUSCULAR; INTRAVENOUS; SUBCUTANEOUS at 13:59

## 2021-09-16 RX ADMIN — CARVEDILOL SCH MG: 6.25 TABLET, FILM COATED ORAL at 07:42

## 2021-09-16 NOTE — NUR
PAGER ID:  [phone] 

MESSAGE:  Room 4881N. Emmanuel Selby. Pt requesting dilaudid instead of morphine. Thanks, 
Kerri [phone]

## 2021-09-16 NOTE — NUR
Problems reprioritized. Patient report given, questions answered & plan of care reviewed 
with GUANAKO Castrejon.

## 2021-09-16 NOTE — NUR
Called report to [facility] - spoke with Rosanne. All questions. comments, and concerns answered 
at this time. Per KRYS Bryant - personal belongings are still there for pt in questions. 
Called pts wife back to advise of personal belongings at [facility]. Wife cell: [phone]

## 2021-09-17 LAB
ALBUMIN SERPL BCP-MCNC: 1.1 G/DL (ref 3.4–5)
ALBUMIN/GLOB SERPL: 0.3 {RATIO} (ref 1.1–1.5)
ALP SERPL-CCNC: 176 IU/L (ref 46–116)
ALT SERPL W P-5'-P-CCNC: 18 U/L (ref 12–78)
ANION GAP SERPL CALCULATED.3IONS-SCNC: 2 MMOL/L (ref 8–16)
ANISOCYTOSIS BLD QL SMEAR: (no result)
AST SERPL W P-5'-P-CCNC: 29 U/L (ref 10–37)
BASOPHILS # BLD AUTO: 0 X10'3 (ref 0–0.2)
BASOPHILS NFR BLD AUTO: 0.6 % (ref 0–1)
BILIRUB SERPL-MCNC: 0.1 MG/DL (ref 0.1–1)
BUN SERPL-MCNC: 8 MG/DL (ref 7–18)
BUN/CREAT SERPL: 15.4 (ref 5.4–32)
CALCIUM SERPL-MCNC: 7.4 MG/DL (ref 8.5–10.1)
CHLORIDE SERPL-SCNC: 105 MMOL/L (ref 99–107)
CO2 SERPL-SCNC: 28.7 MMOL/L (ref 24–32)
CREAT SERPL-MCNC: 0.52 MG/DL (ref 0.6–1.1)
EOSINOPHIL # BLD AUTO: 0.3 X10'3 (ref 0–0.9)
EOSINOPHIL NFR BLD AUTO: 3.7 % (ref 0–6)
ERYTHROCYTE [DISTWIDTH] IN BLOOD BY AUTOMATED COUNT: 17.8 % (ref 11.5–14.5)
GFR SERPL CREATININE-BSD FRML MDRD: > 90 ML/MIN
GLUCOSE SERPL-MCNC: 110 MG/DL (ref 70–104)
HCT VFR BLD AUTO: 23.1 % (ref 42–52)
HGB BLD-MCNC: 7.7 G/DL (ref 14–17.9)
LYMPHOCYTES # BLD AUTO: 1.7 X10'3 (ref 1.1–4.8)
LYMPHOCYTES NFR BLD AUTO: 19.2 % (ref 21–51)
MAGNESIUM SERPL-MCNC: 1.4 MG/DL (ref 1.5–2.4)
MCH RBC QN AUTO: 30.5 PG (ref 27–31)
MCHC RBC AUTO-ENTMCNC: 33.3 G/DL (ref 33–36.5)
MCV RBC AUTO: 91.8 FL (ref 78–98)
MONOCYTES # BLD AUTO: 1.5 X10'3 (ref 0–0.9)
MONOCYTES NFR BLD AUTO: 16.7 % (ref 2–12)
NEUTROPHILS # BLD AUTO: 5.3 X10'3 (ref 1.8–7.7)
NEUTROPHILS NFR BLD AUTO: 59.8 % (ref 42–75)
PLATELET # BLD AUTO: 438 X10'3 (ref 140–440)
PLATELET BLD QL SMEAR: (no result)
PMV BLD AUTO: 8.3 FL (ref 7.4–10.4)
POIKILOCYTOSIS BLD QL SMEAR: (no result)
POLYCHROMASIA BLD QL SMEAR: (no result)
POTASSIUM SERPL-SCNC: 3.7 MMOL/L (ref 3.5–5.1)
PROT SERPL-MCNC: 5 G/DL (ref 6.4–8.2)
RBC # BLD AUTO: 2.52 X10'6 (ref 4.7–6.1)
RBC MORPH BLD: (no result)
SCHISTOCYTES BLD QL SMEAR: (no result)
SODIUM SERPL-SCNC: 136 MMOL/L (ref 135–145)
WBC # BLD AUTO: 8.8 X10'3 (ref 4.5–11)

## 2021-09-17 RX ADMIN — HYDROMORPHONE HYDROCHLORIDE PRN MG: 1 INJECTION, SOLUTION INTRAMUSCULAR; INTRAVENOUS; SUBCUTANEOUS at 01:57

## 2021-09-17 RX ADMIN — HYDROMORPHONE HYDROCHLORIDE PRN MG: 1 INJECTION, SOLUTION INTRAMUSCULAR; INTRAVENOUS; SUBCUTANEOUS at 08:37

## 2021-09-17 NOTE — NUR
Patient in room PCU 3023. I have received report from jodi MUJICA and had the opportunity to 
ask questions and assume patient care.

## 2021-09-17 NOTE — NUR
Problems reprioritized. Patient report given, questions answered & plan of care reviewed 
with Debbie MUJICA. 

-------------------------------------------------------------------------------

Addendum: 09/17/21 at 0641 by Lucía Lewis RN

-------------------------------------------------------------------------------

Amended: Links added.

## 2021-10-05 ENCOUNTER — HOSPITAL ENCOUNTER (EMERGENCY)
Dept: HOSPITAL 94 - ER | Age: 61
LOS: 1 days | Discharge: NURSING, REHAB OR LONG-TERM CARE | End: 2021-10-06
Payer: MEDICAID

## 2021-10-05 VITALS — WEIGHT: 100.2 LBS | HEIGHT: 67 IN | BODY MASS INDEX: 15.73 KG/M2

## 2021-10-05 DIAGNOSIS — K56.609: Primary | ICD-10-CM

## 2021-10-05 DIAGNOSIS — Z88.8: ICD-10-CM

## 2021-10-05 DIAGNOSIS — I25.10: ICD-10-CM

## 2021-10-05 DIAGNOSIS — Z79.899: ICD-10-CM

## 2021-10-05 PROCEDURE — 96361 HYDRATE IV INFUSION ADD-ON: CPT

## 2021-10-05 PROCEDURE — 96360 HYDRATION IV INFUSION INIT: CPT

## 2021-10-05 PROCEDURE — 96374 THER/PROPH/DIAG INJ IV PUSH: CPT

## 2021-10-05 PROCEDURE — 96375 TX/PRO/DX INJ NEW DRUG ADDON: CPT

## 2021-10-05 PROCEDURE — 99285 EMERGENCY DEPT VISIT HI MDM: CPT

## 2021-10-05 PROCEDURE — 71045 X-RAY EXAM CHEST 1 VIEW: CPT

## 2021-10-05 PROCEDURE — 74177 CT ABD & PELVIS W/CONTRAST: CPT

## 2021-10-06 VITALS — SYSTOLIC BLOOD PRESSURE: 129 MMHG | DIASTOLIC BLOOD PRESSURE: 68 MMHG

## 2023-05-12 NOTE — NUR
I reviewed student HEENA Rodriguez charting and am agreement with it. I also agree with his report 
that he gave to the on coming nurse. 5

## 2023-10-07 ENCOUNTER — HOSPITAL ENCOUNTER (OUTPATIENT)
Dept: RADIOLOGY | Facility: MEDICAL CENTER | Age: 63
End: 2023-10-07
Payer: MEDICARE

## 2023-10-08 ENCOUNTER — HOSPITAL ENCOUNTER (INPATIENT)
Facility: MEDICAL CENTER | Age: 63
LOS: 2 days | DRG: 862 | End: 2023-10-10
Attending: STUDENT IN AN ORGANIZED HEALTH CARE EDUCATION/TRAINING PROGRAM | Admitting: STUDENT IN AN ORGANIZED HEALTH CARE EDUCATION/TRAINING PROGRAM
Payer: MEDICARE

## 2023-10-08 DIAGNOSIS — K56.609 SMALL BOWEL OBSTRUCTION (HCC): ICD-10-CM

## 2023-10-08 PROBLEM — E43 SEVERE PROTEIN-CALORIE MALNUTRITION (HCC): Status: ACTIVE | Noted: 2023-10-08

## 2023-10-08 PROBLEM — Z72.0 TOBACCO ABUSE: Status: ACTIVE | Noted: 2023-10-08

## 2023-10-08 PROBLEM — I50.20 HFREF (HEART FAILURE WITH REDUCED EJECTION FRACTION) (HCC): Status: ACTIVE | Noted: 2023-10-08

## 2023-10-08 PROBLEM — K65.1 PELVIC ABSCESS IN MALE (HCC): Status: ACTIVE | Noted: 2023-10-08

## 2023-10-08 LAB
ALBUMIN SERPL BCP-MCNC: 3.5 G/DL (ref 3.2–4.9)
ALBUMIN/GLOB SERPL: 0.9 G/DL
ALP SERPL-CCNC: 166 U/L (ref 30–99)
ALT SERPL-CCNC: 24 U/L (ref 2–50)
ANION GAP SERPL CALC-SCNC: 13 MMOL/L (ref 7–16)
ANISOCYTOSIS BLD QL SMEAR: ABNORMAL
AST SERPL-CCNC: 22 U/L (ref 12–45)
BASOPHILS # BLD AUTO: 0.5 % (ref 0–1.8)
BASOPHILS # BLD: 0.04 K/UL (ref 0–0.12)
BILIRUB SERPL-MCNC: 1.2 MG/DL (ref 0.1–1.5)
BUN SERPL-MCNC: 15 MG/DL (ref 8–22)
BURR CELLS BLD QL SMEAR: NORMAL
CALCIUM ALBUM COR SERPL-MCNC: 9 MG/DL (ref 8.5–10.5)
CALCIUM SERPL-MCNC: 8.6 MG/DL (ref 8.5–10.5)
CHLORIDE SERPL-SCNC: 98 MMOL/L (ref 96–112)
CO2 SERPL-SCNC: 21 MMOL/L (ref 20–33)
COMMENT 1642: NORMAL
CREAT SERPL-MCNC: 0.77 MG/DL (ref 0.5–1.4)
EOSINOPHIL # BLD AUTO: 0.16 K/UL (ref 0–0.51)
EOSINOPHIL NFR BLD: 2.2 % (ref 0–6.9)
ERYTHROCYTE [DISTWIDTH] IN BLOOD BY AUTOMATED COUNT: 72.7 FL (ref 35.9–50)
GFR SERPLBLD CREATININE-BSD FMLA CKD-EPI: 102 ML/MIN/1.73 M 2
GLOBULIN SER CALC-MCNC: 4.1 G/DL (ref 1.9–3.5)
GLUCOSE BLD STRIP.AUTO-MCNC: 152 MG/DL (ref 65–99)
GLUCOSE SERPL-MCNC: 62 MG/DL (ref 65–99)
HCT VFR BLD AUTO: 39.4 % (ref 42–52)
HGB BLD-MCNC: 12.3 G/DL (ref 14–18)
IMM GRANULOCYTES # BLD AUTO: 0.02 K/UL (ref 0–0.11)
IMM GRANULOCYTES NFR BLD AUTO: 0.3 % (ref 0–0.9)
INR PPP: 1.48 (ref 0.87–1.13)
LACTATE SERPL-SCNC: 0.8 MMOL/L (ref 0.5–2)
LACTATE SERPL-SCNC: 1 MMOL/L (ref 0.5–2)
LYMPHOCYTES # BLD AUTO: 1.47 K/UL (ref 1–4.8)
LYMPHOCYTES NFR BLD: 20.2 % (ref 22–41)
MACROCYTES BLD QL SMEAR: ABNORMAL
MCH RBC QN AUTO: 31.4 PG (ref 27–33)
MCHC RBC AUTO-ENTMCNC: 31.2 G/DL (ref 32.3–36.5)
MCV RBC AUTO: 100.5 FL (ref 81.4–97.8)
MONOCYTES # BLD AUTO: 0.73 K/UL (ref 0–0.85)
MONOCYTES NFR BLD AUTO: 10 % (ref 0–13.4)
MORPHOLOGY BLD-IMP: NORMAL
NEUTROPHILS # BLD AUTO: 4.86 K/UL (ref 1.82–7.42)
NEUTROPHILS NFR BLD: 66.8 % (ref 44–72)
NRBC # BLD AUTO: 0 K/UL
NRBC BLD-RTO: 0 /100 WBC (ref 0–0.2)
OVALOCYTES BLD QL SMEAR: NORMAL
PLATELET # BLD AUTO: 405 K/UL (ref 164–446)
PLATELET BLD QL SMEAR: NORMAL
PMV BLD AUTO: 10 FL (ref 9–12.9)
POIKILOCYTOSIS BLD QL SMEAR: NORMAL
POTASSIUM SERPL-SCNC: 4.4 MMOL/L (ref 3.6–5.5)
PROT SERPL-MCNC: 7.6 G/DL (ref 6–8.2)
PROTHROMBIN TIME: 18.1 SEC (ref 12–14.6)
RBC # BLD AUTO: 3.92 M/UL (ref 4.7–6.1)
RBC BLD AUTO: PRESENT
SODIUM SERPL-SCNC: 132 MMOL/L (ref 135–145)
WBC # BLD AUTO: 7.3 K/UL (ref 4.8–10.8)

## 2023-10-08 PROCEDURE — 82962 GLUCOSE BLOOD TEST: CPT

## 2023-10-08 PROCEDURE — 700101 HCHG RX REV CODE 250: Performed by: STUDENT IN AN ORGANIZED HEALTH CARE EDUCATION/TRAINING PROGRAM

## 2023-10-08 PROCEDURE — 36415 COLL VENOUS BLD VENIPUNCTURE: CPT

## 2023-10-08 PROCEDURE — 700105 HCHG RX REV CODE 258: Performed by: STUDENT IN AN ORGANIZED HEALTH CARE EDUCATION/TRAINING PROGRAM

## 2023-10-08 PROCEDURE — 85025 COMPLETE CBC W/AUTO DIFF WBC: CPT

## 2023-10-08 PROCEDURE — 700111 HCHG RX REV CODE 636 W/ 250 OVERRIDE (IP): Performed by: GENERAL PRACTICE

## 2023-10-08 PROCEDURE — 80053 COMPREHEN METABOLIC PANEL: CPT

## 2023-10-08 PROCEDURE — 83605 ASSAY OF LACTIC ACID: CPT | Mod: 91

## 2023-10-08 PROCEDURE — 99223 1ST HOSP IP/OBS HIGH 75: CPT | Mod: 25,AI | Performed by: STUDENT IN AN ORGANIZED HEALTH CARE EDUCATION/TRAINING PROGRAM

## 2023-10-08 PROCEDURE — 700111 HCHG RX REV CODE 636 W/ 250 OVERRIDE (IP): Performed by: STUDENT IN AN ORGANIZED HEALTH CARE EDUCATION/TRAINING PROGRAM

## 2023-10-08 PROCEDURE — 85610 PROTHROMBIN TIME: CPT

## 2023-10-08 PROCEDURE — 99406 BEHAV CHNG SMOKING 3-10 MIN: CPT | Performed by: STUDENT IN AN ORGANIZED HEALTH CARE EDUCATION/TRAINING PROGRAM

## 2023-10-08 PROCEDURE — 99221 1ST HOSP IP/OBS SF/LOW 40: CPT | Performed by: SURGERY

## 2023-10-08 PROCEDURE — 700102 HCHG RX REV CODE 250 W/ 637 OVERRIDE(OP): Performed by: GENERAL PRACTICE

## 2023-10-08 PROCEDURE — A9270 NON-COVERED ITEM OR SERVICE: HCPCS | Performed by: GENERAL PRACTICE

## 2023-10-08 PROCEDURE — 770001 HCHG ROOM/CARE - MED/SURG/GYN PRIV*

## 2023-10-08 RX ORDER — FOLIC ACID 1 MG/1
1 TABLET ORAL DAILY
COMMUNITY

## 2023-10-08 RX ORDER — MIRTAZAPINE 30 MG/1
30 TABLET, FILM COATED ORAL NIGHTLY
COMMUNITY

## 2023-10-08 RX ORDER — POLYETHYLENE GLYCOL 3350 17 G/17G
1 POWDER, FOR SOLUTION ORAL
Status: DISCONTINUED | OUTPATIENT
Start: 2023-10-08 | End: 2023-10-08

## 2023-10-08 RX ORDER — FUROSEMIDE 40 MG/1
40 TABLET ORAL DAILY
COMMUNITY

## 2023-10-08 RX ORDER — PROMETHAZINE HYDROCHLORIDE 25 MG/1
12.5-25 SUPPOSITORY RECTAL EVERY 4 HOURS PRN
Status: DISCONTINUED | OUTPATIENT
Start: 2023-10-08 | End: 2023-10-10 | Stop reason: HOSPADM

## 2023-10-08 RX ORDER — SODIUM CHLORIDE 9 MG/ML
INJECTION, SOLUTION INTRAVENOUS CONTINUOUS
Status: DISCONTINUED | OUTPATIENT
Start: 2023-10-08 | End: 2023-10-08

## 2023-10-08 RX ORDER — ASPIRIN 81 MG/1
81 TABLET ORAL DAILY
COMMUNITY

## 2023-10-08 RX ORDER — ENOXAPARIN SODIUM 100 MG/ML
30 INJECTION SUBCUTANEOUS DAILY
Status: DISCONTINUED | OUTPATIENT
Start: 2023-10-08 | End: 2023-10-10 | Stop reason: HOSPADM

## 2023-10-08 RX ORDER — GABAPENTIN 100 MG/1
100 CAPSULE ORAL 3 TIMES DAILY
COMMUNITY

## 2023-10-08 RX ORDER — OXYCODONE HCL 10 MG/1
10 TABLET, FILM COATED, EXTENDED RELEASE ORAL EVERY 12 HOURS
Status: ON HOLD | COMMUNITY
End: 2023-10-08

## 2023-10-08 RX ORDER — FERROUS SULFATE 325(65) MG
325 TABLET ORAL DAILY
COMMUNITY

## 2023-10-08 RX ORDER — AMOXICILLIN 250 MG
2 CAPSULE ORAL 2 TIMES DAILY
Status: DISCONTINUED | OUTPATIENT
Start: 2023-10-08 | End: 2023-10-08

## 2023-10-08 RX ORDER — ONDANSETRON 4 MG/1
4 TABLET, ORALLY DISINTEGRATING ORAL EVERY 6 HOURS PRN
COMMUNITY

## 2023-10-08 RX ORDER — OXYCODONE HCL 10 MG/1
10 TABLET, FILM COATED, EXTENDED RELEASE ORAL EVERY 12 HOURS
Status: DISCONTINUED | OUTPATIENT
Start: 2023-10-08 | End: 2023-10-08

## 2023-10-08 RX ORDER — BISACODYL 10 MG
10 SUPPOSITORY, RECTAL RECTAL
Status: DISCONTINUED | OUTPATIENT
Start: 2023-10-08 | End: 2023-10-08

## 2023-10-08 RX ORDER — MAGNESIUM OXIDE 400 MG/1
400 TABLET ORAL DAILY
COMMUNITY

## 2023-10-08 RX ORDER — OXYCODONE HYDROCHLORIDE 5 MG/1
5 TABLET ORAL
Status: DISCONTINUED | OUTPATIENT
Start: 2023-10-08 | End: 2023-10-10 | Stop reason: HOSPADM

## 2023-10-08 RX ORDER — CHOLECALCIFEROL (VITAMIN D3) 125 MCG
500 CAPSULE ORAL DAILY
COMMUNITY

## 2023-10-08 RX ORDER — OXYCODONE HYDROCHLORIDE 10 MG/1
10 TABLET ORAL
Status: DISCONTINUED | OUTPATIENT
Start: 2023-10-08 | End: 2023-10-10 | Stop reason: HOSPADM

## 2023-10-08 RX ORDER — METRONIDAZOLE 500 MG/100ML
500 INJECTION, SOLUTION INTRAVENOUS EVERY 12 HOURS
Status: DISCONTINUED | OUTPATIENT
Start: 2023-10-08 | End: 2023-10-09

## 2023-10-08 RX ORDER — HYDROMORPHONE HYDROCHLORIDE 1 MG/ML
0.5 INJECTION, SOLUTION INTRAMUSCULAR; INTRAVENOUS; SUBCUTANEOUS
Status: DISCONTINUED | OUTPATIENT
Start: 2023-10-08 | End: 2023-10-09

## 2023-10-08 RX ORDER — ONDANSETRON 2 MG/ML
4 INJECTION INTRAMUSCULAR; INTRAVENOUS EVERY 4 HOURS PRN
Status: DISCONTINUED | OUTPATIENT
Start: 2023-10-08 | End: 2023-10-10 | Stop reason: HOSPADM

## 2023-10-08 RX ORDER — NITROGLYCERIN 0.4 MG/1
0.4 TABLET SUBLINGUAL PRN
COMMUNITY

## 2023-10-08 RX ORDER — DEXTROSE MONOHYDRATE 25 G/50ML
25 INJECTION, SOLUTION INTRAVENOUS
Status: DISCONTINUED | OUTPATIENT
Start: 2023-10-08 | End: 2023-10-10 | Stop reason: HOSPADM

## 2023-10-08 RX ORDER — MORPHINE SULFATE 4 MG/ML
4 INJECTION INTRAVENOUS ONCE
Status: COMPLETED | OUTPATIENT
Start: 2023-10-08 | End: 2023-10-08

## 2023-10-08 RX ORDER — PROCHLORPERAZINE EDISYLATE 5 MG/ML
5-10 INJECTION INTRAMUSCULAR; INTRAVENOUS EVERY 4 HOURS PRN
Status: DISCONTINUED | OUTPATIENT
Start: 2023-10-08 | End: 2023-10-10 | Stop reason: HOSPADM

## 2023-10-08 RX ORDER — MIRTAZAPINE 30 MG/1
30 TABLET, FILM COATED ORAL NIGHTLY
Status: DISCONTINUED | OUTPATIENT
Start: 2023-10-08 | End: 2023-10-10 | Stop reason: HOSPADM

## 2023-10-08 RX ORDER — PROMETHAZINE HYDROCHLORIDE 25 MG/1
12.5-25 TABLET ORAL EVERY 4 HOURS PRN
Status: DISCONTINUED | OUTPATIENT
Start: 2023-10-08 | End: 2023-10-10 | Stop reason: HOSPADM

## 2023-10-08 RX ORDER — ONDANSETRON 4 MG/1
4 TABLET, ORALLY DISINTEGRATING ORAL EVERY 4 HOURS PRN
Status: DISCONTINUED | OUTPATIENT
Start: 2023-10-08 | End: 2023-10-08

## 2023-10-08 RX ADMIN — HYDROMORPHONE HYDROCHLORIDE 0.5 MG: 1 INJECTION, SOLUTION INTRAMUSCULAR; INTRAVENOUS; SUBCUTANEOUS at 20:12

## 2023-10-08 RX ADMIN — OXYCODONE HYDROCHLORIDE 10 MG: 10 TABLET ORAL at 17:40

## 2023-10-08 RX ADMIN — SODIUM CHLORIDE: 9 INJECTION, SOLUTION INTRAVENOUS at 04:48

## 2023-10-08 RX ADMIN — CEFTRIAXONE SODIUM 2000 MG: 10 INJECTION, POWDER, FOR SOLUTION INTRAVENOUS at 04:48

## 2023-10-08 RX ADMIN — METRONIDAZOLE 500 MG: 500 INJECTION, SOLUTION INTRAVENOUS at 03:19

## 2023-10-08 RX ADMIN — ENOXAPARIN SODIUM 30 MG: 100 INJECTION SUBCUTANEOUS at 03:49

## 2023-10-08 RX ADMIN — OXYCODONE HYDROCHLORIDE 10 MG: 10 TABLET ORAL at 08:21

## 2023-10-08 RX ADMIN — OXYCODONE HYDROCHLORIDE 10 MG: 10 TABLET ORAL at 23:02

## 2023-10-08 RX ADMIN — METRONIDAZOLE 500 MG: 500 INJECTION, SOLUTION INTRAVENOUS at 17:41

## 2023-10-08 RX ADMIN — OXYCODONE HYDROCHLORIDE 10 MG: 10 TABLET ORAL at 13:10

## 2023-10-08 RX ADMIN — MORPHINE SULFATE 4 MG: 4 INJECTION, SOLUTION INTRAMUSCULAR; INTRAVENOUS at 03:47

## 2023-10-08 RX ADMIN — MIRTAZAPINE 30 MG: 30 TABLET, FILM COATED ORAL at 20:11

## 2023-10-08 RX ADMIN — DEXTROSE MONOHYDRATE 25 G: 25 INJECTION, SOLUTION INTRAVENOUS at 03:17

## 2023-10-08 ASSESSMENT — PAIN DESCRIPTION - PAIN TYPE
TYPE: ACUTE PAIN
TYPE: ACUTE PAIN
TYPE: CHRONIC PAIN
TYPE: ACUTE PAIN
TYPE: CHRONIC PAIN
TYPE: ACUTE PAIN

## 2023-10-08 ASSESSMENT — COGNITIVE AND FUNCTIONAL STATUS - GENERAL
SUGGESTED CMS G CODE MODIFIER DAILY ACTIVITY: CH
MOBILITY SCORE: 24
DAILY ACTIVITIY SCORE: 24
SUGGESTED CMS G CODE MODIFIER MOBILITY: CH

## 2023-10-08 ASSESSMENT — LIFESTYLE VARIABLES
TOTAL SCORE: 0
HOW MANY TIMES IN THE PAST YEAR HAVE YOU HAD 5 OR MORE DRINKS IN A DAY: 0
EVER HAD A DRINK FIRST THING IN THE MORNING TO STEADY YOUR NERVES TO GET RID OF A HANGOVER: NO
HAVE PEOPLE ANNOYED YOU BY CRITICIZING YOUR DRINKING: NO
SUBSTANCE_ABUSE: 0
HAVE YOU EVER FELT YOU SHOULD CUT DOWN ON YOUR DRINKING: NO
ON A TYPICAL DAY WHEN YOU DRINK ALCOHOL HOW MANY DRINKS DO YOU HAVE: 0
DOES PATIENT WANT TO STOP DRINKING: NO
AVERAGE NUMBER OF DAYS PER WEEK YOU HAVE A DRINK CONTAINING ALCOHOL: 0
EVER FELT BAD OR GUILTY ABOUT YOUR DRINKING: NO
TOTAL SCORE: 0
CONSUMPTION TOTAL: NEGATIVE
ALCOHOL_USE: NO
TOTAL SCORE: 0

## 2023-10-08 ASSESSMENT — ENCOUNTER SYMPTOMS
TINGLING: 0
CHILLS: 0
HEADACHES: 0
DOUBLE VISION: 0
VOMITING: 1
DEPRESSION: 0
BACK PAIN: 0
SENSORY CHANGE: 0
FEVER: 0
ORTHOPNEA: 0
COUGH: 0
BLURRED VISION: 0
PALPITATIONS: 0
NECK PAIN: 0
HEMOPTYSIS: 0
DIZZINESS: 0
ABDOMINAL PAIN: 1
SPUTUM PRODUCTION: 0
TREMORS: 0
NAUSEA: 1
DIARRHEA: 0

## 2023-10-08 ASSESSMENT — PATIENT HEALTH QUESTIONNAIRE - PHQ9
2. FEELING DOWN, DEPRESSED, IRRITABLE, OR HOPELESS: NOT AT ALL
SUM OF ALL RESPONSES TO PHQ9 QUESTIONS 1 AND 2: 0
1. LITTLE INTEREST OR PLEASURE IN DOING THINGS: NOT AT ALL

## 2023-10-08 ASSESSMENT — FIBROSIS 4 INDEX: FIB4 SCORE: 0.68

## 2023-10-08 NOTE — ASSESSMENT & PLAN NOTE
Patient with an extensive intra-abdominal surgical history, with a history of 6 prior small bowel obstructions, managed nonsurgically, now presents with small bowel obstruction that was demonstrated on CT imaging.    He has had 4 to 5 days of some nausea and vomiting, along with poor p.o. intake.  He does have output from his colostomy, and states that he there is flatus in the bag.  At this time, patient is refusing NG tube placement  N.p.o., IV fluid, pain control  General surgery -patient improved with conservative management, no NGT was placed.  Advance diet as tolerated

## 2023-10-08 NOTE — PROGRESS NOTES
63-year-old male with previous abdominal surgeries and pelvic abscess in the past presents to ED for nausea vomiting abdominal distention.  CAT scan at outside facility showing transition point in right lower quadrant consistent with small bowel obstruction, also posterior pelvic abscess is noted.  White blood cell count normal.    no significant lab abnormalities noted.  Patient given fluids, Zofran, Zosyn.    Request to be transferred for IR consult/surgery consult

## 2023-10-08 NOTE — CARE PLAN
Problem: Knowledge Deficit - Standard  Goal: Patient and family/care givers will demonstrate understanding of plan of care, disease process/condition, diagnostic tests and medications  Outcome: Progressing     Problem: Pain - Standard  Goal: Alleviation of pain or a reduction in pain to the patient’s comfort goal  Outcome: Progressing     The patient is Stable - Low risk of patient condition declining or worsening    Shift Goals  Clinical Goals: IR Drain Placement  Patient Goals: Comfort    Progress made toward(s) clinical / shift goals:  Educated patient on plan of care this shift, Pain medicated per MAR,IR Drain placement 10/9    Patient is not progressing towards the following goals:

## 2023-10-08 NOTE — CARE PLAN
The patient is Stable - Low risk of patient condition declining or worsening    Shift Goals  Clinical Goals: Pain control, nausea control  Patient Goals: Eat    Progress made toward(s) clinical / shift goals:  Pain medication administered PRN once. Patient able to sleep intermittently. No nausea throughout the shift, no prns needed    Patient is not progressing towards the following goals:

## 2023-10-08 NOTE — ASSESSMENT & PLAN NOTE
Patient with history of prior pelvic abscess approximately 4 months ago status post drain placement and removal.    CT imaging shows recurrence  Estefany Villareal  Case discussed with IR, given patient is asymptomatic, no white count, no fever, likely sterile postop collection, drain is not indicated at this time.

## 2023-10-08 NOTE — ASSESSMENT & PLAN NOTE
Patient smokes several cigarettes per day.  Nicotine patch and/or gum offered.   I discussed cessation with patient including starting on nicotine patch and/or gum on discharge.  I also discussed medications to help with cessation with patient including Wellbutrin and Chantix, offered .  Smoking cessation discussed with patient for 4 minutes.

## 2023-10-08 NOTE — ASSESSMENT & PLAN NOTE
Per chart review, patient has an ejection fraction of 20 to 25%  Holding home medications due to low normotensive, will resume once clinically more stable

## 2023-10-08 NOTE — PROGRESS NOTES
Hypoglycemia Intervention    Hypoglycemia protocol intervention:  Blood glucose 62 at 0304.  Intervention:  D50 IV per MAR  Repeat blood glucose 152 at 0348.  Intervention: Patient NPO, recheck blood glucose in 1 hour   Dr. Perera notified of the above at 0327.

## 2023-10-08 NOTE — PROGRESS NOTES
Report received from RN from other facility. Assumed care upon arrival.  Assessment complete.  A&O x 4. Patient calls appropriately.  Patient ambulates independently  Patient has 7/10 pain. Pain managed with prescribed medications.  Denies N&V. Strict NPO  + void, + flatus, + BM via ostomy.   R cholesystostomy drain  Patient denies SOB. On room air.  Patient calm, pleasant, and cooperative.  Review plan with of care with patient. Call light and personal belongings within reach. Hourly rounding in place. All needs met at this time.

## 2023-10-08 NOTE — H&P
Hospital Medicine History & Physical Note    Date of Service  10/8/2023    Primary Care Physician  No primary care provider on file.        Code Status  Full Code    Chief Complaint  No chief complaint on file.      History of Presenting Illness  Sudheer Tatum is a 63 y.o. male with past medical history of hypertension, hyperlipidemia, CAD status post multiple stents, dilated cardiomyopathy, heart failure with ejection fraction of 20 to 25%, and extensive surgical history with multiple small bowel obstruction, prior pelvic abscess status post drain placement removal, gallbladder stage status post cholecystectomy tube placement approximate 4 months ago who presented 10/8/2023 with nausea and vomiting.  Patient states that approximately 4 years ago he had a partial colon resection with colostomy tube placement for possible cancer.  This surgery was complicated by colovesicular fistula, so there is a takedown of his colostomy tube, and he had a placement of a new colostomy tube.  This was all done 4 years ago, and patient has never had a reversal.  He states that given his cardiac history of multiple MIs and stent placements, the surgeons did not feel comfortable operating on him again.  Since his initial surgery, patient has had a total of 6 small bowel obstructions, all managed nonoperatively with NG tube placement. Patient presented to the outside facility for persistent nausea and vomiting for 4-5 days. He is able to keep some minimal food down. He is having output from the ostomy. He does have gas that comes out of the ostomy as well.  CT imaging reveals a small bowel obstruction with transition point in the right lower abdomen. Small bowl loops are markedly dilated up to 6.2 cm in diameter. There is a recurrent abscess I the posterior pelvis. The is a cholecystostomy tube in place.     Discussion was had with patient about insertion of NG tube.  Patient is refusing at this time.  He states that he has had  improvement with his nausea and vomiting.  He does not feel like an NG tube is necessary.  He states that if he has persistent nausea and vomiting, he may be amenable for an NG tube placement.  Patient is aware of the risks.    Patient was very admitted for management of small bowel obstruction and his pelvic abscess.  He will be started on IV antibiotics and will need interventional radiology consult in the morning      I discussed the plan of care with patient.    Review of Systems  Review of Systems   Constitutional:  Negative for chills and fever.   HENT:  Negative for ear discharge, ear pain, hearing loss, nosebleeds and tinnitus.    Eyes:  Negative for blurred vision and double vision.   Respiratory:  Negative for cough, hemoptysis and sputum production.    Cardiovascular:  Negative for chest pain, palpitations and orthopnea.   Gastrointestinal:  Positive for abdominal pain, nausea and vomiting. Negative for diarrhea.   Genitourinary:  Negative for dysuria, frequency and urgency.   Musculoskeletal:  Negative for back pain and neck pain.   Neurological:  Negative for dizziness, tingling, tremors, sensory change and headaches.   Psychiatric/Behavioral:  Negative for depression, substance abuse and suicidal ideas.        Past Medical History   has no past medical history on file.    Surgical History   has no past surgical history on file.     Family History  family history is not on file.   Family history reviewed with patient. There is family history that is pertinent to the chief complaint.     Social History       Allergies  Not on File    Medications  None       Physical Exam                             Physical Exam  Constitutional:       General: He is not in acute distress.     Appearance: Normal appearance. He is normal weight. He is not ill-appearing, toxic-appearing or diaphoretic.   HENT:      Head: Normocephalic and atraumatic.      Mouth/Throat:      Mouth: Mucous membranes are moist.   Eyes:       "Pupils: Pupils are equal, round, and reactive to light.   Cardiovascular:      Rate and Rhythm: Normal rate and regular rhythm.      Pulses: Normal pulses.      Heart sounds: Normal heart sounds. No murmur heard.     No friction rub. No gallop.   Pulmonary:      Effort: Pulmonary effort is normal. No respiratory distress.      Breath sounds: Normal breath sounds. No stridor. No wheezing, rhonchi or rales.   Chest:      Chest wall: No tenderness.   Abdominal:      General: There is distension.      Palpations: There is no mass.      Tenderness: There is abdominal tenderness. There is no right CVA tenderness, left CVA tenderness, guarding or rebound.      Hernia: No hernia is present.      Comments: Right-sided cholecystectomy tube placed.  There is left abdominal wall colostomy.  Prior scars appear to be healing appropriately.  Abdomen is mildly distended.  There is minimal tenderness to palpation.   Musculoskeletal:         General: No swelling, tenderness, deformity or signs of injury.      Right lower leg: No edema.      Left lower leg: No edema.   Skin:     General: Skin is warm and dry.      Capillary Refill: Capillary refill takes less than 2 seconds.      Coloration: Skin is not jaundiced or pale.      Findings: No bruising, erythema, lesion or rash.   Neurological:      General: No focal deficit present.      Mental Status: He is alert and oriented to person, place, and time. Mental status is at baseline.      Cranial Nerves: No cranial nerve deficit.      Sensory: No sensory deficit.      Motor: No weakness.      Coordination: Coordination normal.      Gait: Gait normal.      Deep Tendon Reflexes: Reflexes normal.   Psychiatric:         Mood and Affect: Mood normal.         Laboratory:          No results for input(s): \"ALTSGPT\", \"ASTSGOT\", \"ALKPHOSPHAT\", \"TBILIRUBIN\", \"DBILIRUBIN\", \"GAMMAGT\", \"AMYLASE\", \"LIPASE\", \"ALB\", \"PREALBUMIN\", \"GLUCOSE\" in the last 72 hours.      No results for input(s): \"NTPROBNP\" " "in the last 72 hours.      No results for input(s): \"TROPONINT\" in the last 72 hours.    Imaging:  IR-CONSULT AND TREAT    (Results Pending)       X-Ray:  I have personally reviewed the images and compared with prior images.  EKG:  I have personally reviewed the images and compared with prior images.    Assessment/Plan:  Justification for Admission Status  I anticipate this patient will require at least two midnights for appropriate medical management, necessitating inpatient admission because Small bowel obstruction, pelvic abscess requiring IV antibiotics    Patient will need a Med/Surg bed on SURGICAL service .  The need is secondary to Small bowel obstruction, pelvic abscess requiring IV antibiotics.    * Small bowel obstruction (HCC)  Assessment & Plan  Patient with an extensive intra-abdominal surgical history, with a history of 6 prior small bowel obstructions, managed nonsurgically, now presents with small bowel obstruction that was demonstrated on CT imaging.  He has had 4 to 5 days of some nausea and vomiting, along with poor p.o. intake.  He does have output from his colostomy, and states that he there is flatus in the bag.  At this time, patient is refusing NG tube placement  Patient believes that this will resolve on its own.  He is willing to remain n.p.o. and continue with IV fluids.  I discussed with patient and explained that if his nausea and vomiting persist we can rediscuss the NG tube placement.  Patient will be agreeable to that.  There is no indication for emergent general surgery consult at this time, but day team should consult general surgery for further evaluation and monitoring    Tobacco abuse  Assessment & Plan  Patient smokes several cigarettes per day.  Nicotine patch and/or gum offered.   I discussed cessation with patient including starting on nicotine patch and/or gum on discharge.  I also discussed medications to help with cessation with patient including Wellbutrin and Chantix, " offered .  Smoking cessation discussed with patient for 4 minutes.      Severe protein-calorie malnutrition (HCC)  Assessment & Plan  Patient will need nutrition consult once SBO resolves    HFrEF (heart failure with reduced ejection fraction) ([facility])  Assessment & Plan  Per chart review, patient has an ejection fraction of 20 to 25%  Holding home medications due to strict n.p.o.    Pelvic abscess in male ([facility])  Assessment & Plan  Patient with history of prior pelvic abscess approximately 4 months ago status post drain placement and removal.  CT imaging shows a recurrence.  I will start the patient on ceftriaxone and Flagyl IV  Day team to consult interventional radiology for possible drainage        VTE prophylaxis: enoxaparin ppx

## 2023-10-08 NOTE — ASSESSMENT & PLAN NOTE
CT scan with dilated small bowel consistent with an obstruction. No mesenteric edema or hypoenhancement of the bowel wall concerning for ischemia.  Labs without leukocytosis or lactic acidosis.  Abdomen distended but soft and non-tender on exam, colostomy with reported output.  Given that the patient states he is at his baseline can trial clear liquids after his IR procedure and will monitor abdominal exam and colostomy output.   - Patient states IR case canceled, no indication for draining.   10/10 Tolerating diet.

## 2023-10-08 NOTE — HOSPITAL COURSE
This is a 63-year-old male with past medical history of hypertension, dyslipidemia, dilated cardiomyopathy, chronic systolic heart failure with LVEF 20%, CAD with multiple stents, extensive surgical history of multiple small bowel obstruction, prior pelvic abscess requiring drain placement, cholecystitis requiring cholecystostomy about 4 months ago, history of partial colon resection with colostomy complicated by colovesicular fistula requiring takedown of colostomy tube and placement of a new colostomy that happened 2019 who was admitted on 10/8/2023 with abdominal pain, nausea and vomiting.    Patient reports because of his extensive cardiac history, his surgeons do not feel comfortable reversing the colostomy.  Patient has since then had 6 small bowel obstructions managed conservatively with NGT decompression.  CT imaging on admit noted SBO with transition point in the right lower abdomen, and noted recurrent abscess in the posterior pelvis.    At this time, patient reports his nausea and vomiting has improved, continues with gas and output from ostomy, he is currently refusing NGT at this time.  SBO resolved conservatively, patient is tolerating advance diet.    Case discussed with IR, given patient is asymptomatic, no white count, no fever, likely sterile postop collection, drain is not indicated at this time.

## 2023-10-08 NOTE — PROGRESS NOTES
Hospital Medicine Daily Progress Note    Date of Service  10/8/2023    Chief Complaint  Sudheer Tatum is a 63 y.o. male admitted 10/8/2023 with abdominal pain, nausea vomiting    Hospital Course  This is a 63-year-old male with past medical history of hypertension, dyslipidemia, dilated cardiomyopathy, chronic systolic heart failure with LVEF 20%, CAD with multiple stents, extensive surgical history of multiple small bowel obstruction, prior pelvic abscess requiring drain placement, cholecystitis requiring cholecystostomy about 4 months ago, history of partial colon resection with colostomy complicated by colovesicular fistula requiring takedown of colostomy tube and placement of a new colostomy that happened 2019 who was admitted on 10/8/2023 with abdominal pain, nausea and vomiting.    Patient reports because of his extensive cardiac history, his surgeons do not feel comfortable reversing the colostomy.  Patient has since then had 6 small bowel obstructions managed conservatively with NGT decompression.  CT imaging on admit noted SBO with transition point in the right lower abdomen, and noted recurrent abscess in the posterior pelvis.    At this time, patient reports his nausea and vomiting has improved, continues with gas and output from ostomy, he is currently refusing NGT at this time, however if he worsens clinically he is amendable for placement.    IR consulted for drain placement of pelvic abscess.    Interval Problem Update  At this time, patient believes his nausea and vomiting is improving, refusing NGT at this time, if he worsens clinically, patient is amenable for placement.    IR consulted for drain placement, plan for placement 10/9.    Continue n.p.o., IV fluids, pain control.  General surgery consulted for SBO, patient with no further nausea or vomiting, has good ostomy output, diet advanced to clear liquid diet.    I have discussed this patient's plan of care and discharge plan at IDT rounds  today with Case Management, Nursing, Nursing leadership, and other members of the IDT team.    Consultants/Specialty  general surgery and IR    Code Status  Full Code    Disposition  The patient is not medically cleared for discharge to home or a post-acute facility.  Anticipate discharge to: home with close outpatient follow-up    I have placed the appropriate orders for post-discharge needs.    Review of Systems  Review of Systems   Gastrointestinal:  Positive for abdominal pain.   All other systems reviewed and are negative.       Physical Exam  Temp:  [36.5 °C (97.7 °F)-36.7 °C (98.1 °F)] 36.6 °C (97.9 °F)  Pulse:  [77-81] 79  Resp:  [16-18] 18  BP: ()/(61-69) 102/69  SpO2:  [94 %-99 %] 99 %    Physical Exam  Vitals and nursing note reviewed.   Constitutional:       General: He is not in acute distress.     Appearance: He is ill-appearing.   HENT:      Head: Normocephalic and atraumatic.   Eyes:      Extraocular Movements: Extraocular movements intact.      Conjunctiva/sclera: Conjunctivae normal.      Pupils: Pupils are equal, round, and reactive to light.   Cardiovascular:      Rate and Rhythm: Normal rate and regular rhythm.      Pulses: Normal pulses.      Heart sounds: No murmur heard.     No friction rub. No gallop.   Pulmonary:      Effort: Pulmonary effort is normal. No respiratory distress.      Breath sounds: Normal breath sounds. No wheezing, rhonchi or rales.   Abdominal:      General: Bowel sounds are normal. There is no distension.      Palpations: Abdomen is soft.      Tenderness: There is abdominal tenderness.      Comments: Cholecystomy tube in place with output  Colostomy in place   Musculoskeletal:         General: No swelling or tenderness. Normal range of motion.      Cervical back: Normal range of motion and neck supple. No muscular tenderness.      Right lower leg: No edema.      Left lower leg: No edema.   Skin:     General: Skin is warm and dry.      Capillary Refill: Capillary  refill takes less than 2 seconds.      Findings: No bruising, erythema or rash.   Neurological:      General: No focal deficit present.      Mental Status: He is alert and oriented to person, place, and time.         Fluids    Intake/Output Summary (Last 24 hours) at 10/8/2023 1307  Last data filed at 10/8/2023 0915  Gross per 24 hour   Intake 317.09 ml   Output 50 ml   Net 267.09 ml       Laboratory  Recent Labs     10/08/23  0148   WBC 7.3   RBC 3.92*   HEMOGLOBIN 12.3*   HEMATOCRIT 39.4*   .5*   MCH 31.4   MCHC 31.2*   RDW 72.7*   PLATELETCT 405   MPV 10.0     Recent Labs     10/08/23  0148   SODIUM 132*   POTASSIUM 4.4   CHLORIDE 98   CO2 21   GLUCOSE 62*   BUN 15   CREATININE 0.77   CALCIUM 8.6     Recent Labs     10/08/23  0944   INR 1.48*               Imaging  IR-DRAIN-PERITONEAL    (Results Pending)        Assessment/Plan  * Small bowel obstruction (HCC)  Assessment & Plan  Patient with an extensive intra-abdominal surgical history, with a history of 6 prior small bowel obstructions, managed nonsurgically, now presents with small bowel obstruction that was demonstrated on CT imaging.    He has had 4 to 5 days of some nausea and vomiting, along with poor p.o. intake.  He does have output from his colostomy, and states that he there is flatus in the bag.  At this time, patient is refusing NG tube placement  N.p.o., IV fluid, pain control  General surgery consulted    Pelvic abscess in male (HCC)  Assessment & Plan  Patient with history of prior pelvic abscess approximately 4 months ago status post drain placement and removal.    CT imaging shows recurrence  Rocephin Flagyl  IR consulted for drain  Cultures pending    Tobacco abuse  Assessment & Plan  Patient smokes several cigarettes per day.  Nicotine patch and/or gum offered.   I discussed cessation with patient including starting on nicotine patch and/or gum on discharge.  I also discussed medications to help with cessation with patient including  Wellbutrin and Chantix, offered .  Smoking cessation discussed with patient for 4 minutes.      Severe protein-calorie malnutrition (HCC)  Assessment & Plan  Patient will need nutrition consult once SBO resolves    HFrEF (heart failure with reduced ejection fraction) (HCC)  Assessment & Plan  Per chart review, patient has an ejection fraction of 20 to 25%  Holding home medications due to low normotensive, will resume once clinically more stable         VTE prophylaxis: Lovenox    I have performed a physical exam and reviewed and updated ROS and Plan today (10/8/2023). In review of yesterday's note (10/7/2023), there are no changes except as documented above.

## 2023-10-08 NOTE — PROGRESS NOTES
4 Eyes Skin Assessment Completed by SOPHIA Wilburn and SOPHIA Hayes     Head WDL  Ears WDL  Nose WDL  Mouth WDL  Neck WDL  Breast/Chest WDL   Shoulder Blades WDL   Spine  WDL  (R) Arm/Elbow/Hand WDL, PIV in place  (L) Arm/Elbow/Hand WDL  Abdomen: L ostomy, R cholesystostomy drain  Groin WDL  Scrotum/Coccyx/Buttocks WDL  (R) Leg WDL  (L) Leg WDL  (R) Heel/Foot/Toe WDL  (L) Heel/Foot/Toe WDL              Devices In Places n/a        Interventions In Place n/a        Pictures Uploaded Into Epic n/a  Wound Consult Placed n/a  RN Wound Prevention Protocol Ordered n/a

## 2023-10-08 NOTE — PROGRESS NOTES
Assumed care of patient at 0645. Bedside report receiveD. Assessment complete.  AA&Ox4. Denies CP/SOB.  Reporting 8/10 pain. Medicated per MAR.   Educated patient regarding pharmacologic and non pharmacologic modalities for pain management.  Skin per flowsheets  Tolerating Clear Liquid diet. Denies N/V.  + void. Output to Ostomy   Pt ambulates independently.  All needs met at this time. Call light within reach. Pt calls appropriately. Bed low and locked, non skid socks in place. Hourly rounding in place.

## 2023-10-08 NOTE — DIETARY
"Nutrition services: Day 0 of admit.  Sudheer Tatum is a 63 y.o. male with admitting DX of small bowel obstruction.    Consult received for BMI <19, poor oral intake, failure to thrive. Met with pt at bedside. Pt was sitting up in bed, he appeared thin. Severe muscle loss evidenced by thin upper leg, depression along thigh, prominent patella bone, prominent brow bone; severe fat loss evidenced by hollowed orbitals, prominent zygomatic arches, visible ribs and squaring of shoulders. Pt reports a low to fair appetite PTA. Pt shared that he was only able to consume strawberry yogurt and part of a banana, one Ensure and one Boost Plus per day. Pt reports to recent hospitalizations and being NPO on and off. Pt stated that his appetite is adequate when he is home and not hospitalized. He shared that his weight has decreased 9 lbs over the past two weeks. He does not have a UBW, but stated his weight has been fluctuating between 112-130 lbs. Diet recently advanced to clear liquids. RD will add supplement order and adjust menu accordingly for pt to receive Boost Plus once diet advances. Boost Breeze to be added while on clear liquids to bolster protein intake.     Assessment:  Height: 170.2 cm (5' 7\")  Weight: 48.9 kg (107 lb 12.9 oz)  Body mass index is 16.88 kg/m²., BMI classification: underweight  Diet/Intake: clear liquid; no meals yet recorded to assess intake    Evaluation:   Admitted with nausea and vomiting.  PMH: hypertension, hyperlipidemia, CAD status post multiple stents, dilated cardiomyopathy, heart failure with ejection fraction of 20 to 25%, and extensive surgical history with multiple small bowel obstruction, prior pelvic abscess status post drain placement removal, gallbladder stage status post cholecystectomy tube placement.  CT showed small bowel obstruction, recurrent abscess in posterior pelvis.   Sodium 132, Alk phos 166   Meds reviewed.    Malnutrition Risk: Pt with severe, chronic malnutrition " related to multiple abdominal surgeries, with recurrent small bowel obstruction, recurrent pelvic abscess, AEB physical markers for severe fat and severe muscle loss as noted above.    Recommendations/Plan:  Oral nutrition supplements TID.  Advance diet as tolerated per MD.  Encourage intake of meals and supplements.  Document intake of all meals and supplements as % taken in ADLs to provide interdisciplinary communication across all shifts.   Monitor weight.  Nutrition rep will continue to see patient for ongoing meal and snack preferences.     RD will follow.

## 2023-10-08 NOTE — CONSULTS
DATE OF CONSULTATION:  10/8/2023     REFERRING PHYSICIAN:   Ruma Sheikh D.O.     CONSULTING PHYSICIAN:  Obdulio Jeff M.D.     REASON FOR CONSULTATION:  I have been asked by  to see the patient in surgical consultation for evaluation of small bowel obstruction.    HISTORY OF PRESENT ILLNESS: The patient is a 63 year-old White man with a complicated medical/surgical history including congestive heart failure (LVEF 20%), CAD, segmental colectomy, and colostomy revision with who was transferred from [facility] in [facility] with a four or five - day history of  nausea, vomiting, and decreased appetite. Ct scan at the sending facility was consistent with a small bowel obstruction and also demonstrated a pelvic abscess. The surgeons at the sending facility did not feel comfortable operating on the patient if needed given his cardiac history. He denies fevers, chills, melena, or hematochezia. He endorses emptying his colostomy bag during his trip to [place]. At the time of my evaluation he denies nausea or vomiting, he states his abdomen is at its normal state although it is distended on exam. He has refused placement of a nasogastric tube stating that he believes this will resolve on its own. He had a cholecystostomy tube placed approximately 4 months ago.  The patient has undergone segmental colectomy as well as colostomy revision. The patient denies any previous surgery for obstructive symptoms although he notes six admission for small bowel obstruction since his initial surgery that have all resolved with conservative management.    PAST MEDICAL HISTORY: CHF, CAD    PAST SURGICAL HISTORY: segmental colectomy, colostomy revision    ALLERGIES: No Known Allergies    CURRENT MEDICATIONS:    Home Medications       Reviewed by Akila Yancey R.N. (Registered Nurse) on 10/08/23 at 0214  Med List Status: Not Addressed     Medication Last Dose Status   aspirin 81 MG EC tablet 10/1/2023 Active  "  cyanocobalamin (VITAMIN B-12) 500 MCG Tab 10/1/2023 Active   ferrous sulfate 325 (65 Fe) MG tablet 10/1/2023 Active   folic acid (FOLVITE) 1 MG Tab 10/1/2023 Active   furosemide (LASIX) 40 MG Tab 10/1/2023 Active   gabapentin (NEURONTIN) 100 MG Cap 10/7/2023 Active   magnesium oxide (MAG-OX) 400 MG Tab tablet 10/1/2023 Active   mirtazapine (REMERON) 30 MG Tab tablet 10/1/2023 Active   nitroglycerin (NITROSTAT) 0.4 MG SL Tab  Active   ondansetron (ZOFRAN ODT) 4 MG TABLET DISPERSIBLE 10/1/2023 Active   oxyCODONE CR (OXYCONTIN) 10 MG Tablet Extended Release 12 hour Abuse-Deterrent 10/1/2023 Active   salmeterol (SEREVENT) 50 MCG/ACT AEROSOL POWDER, BREATH ACTIVATED 10/7/2023 Active   sertraline (ZOLOFT) 50 MG Tab 10/1/2023 Active                    FAMILY HISTORY: family history is not on file.    SOCIAL HISTORY: he is a current smoker, he denies current alcohol or illicit drug use.    REVIEW OF SYSTEMS: Comprehensive review of systems is negative with the exception of the aforementioned HPI, PMH, and PSH bullets in accordance with CMS guidelines.    PHYSICAL EXAMINATION:      Constitutional:     Vital Signs: /69   Pulse 79   Temp 36.6 °C (97.9 °F) (Temporal)   Resp 18   Ht 1.702 m (5' 7\")   Wt 48.9 kg (107 lb 12.9 oz)   SpO2 99%    General Appearance: alert in no acute distress.   HEENT:    Demonstrates symmetric, reactive pupils. Extraocular muscles   are intact. Nares and oropharynx are clear.   Neck:    Supple. No adenopathy.  Respiratory:   Inspection: Unlabored respirations, no intercostal retractions, paradoxical motion, or accessory muscle use.   Auscultation: normal.  Cardiovascular:   Inspection: The skin is warm and well purfused.  Auscultation: Regular rate and rhythm.   Peripheral Pulses: Normal.   Abdomen:  Inspection: Abdominal inspection reveals no abrasions, contusions, lacerations or penetrating wounds and wide lower midline incisional scar, left-sided colostomy in place .   Palpation: " Palpation is remarkable for no significant tenderness, guarding, or peritoneal findings. No abdominal wall hernias. The abdomen is distended and tympanic but soft.  Extremities:   Examination of the upper and lower extremities demonstrates no cyanosis edema or clubbing.  Neurologic:   Alert & oriented to person, time and place. Normal motor function. Normal sensory function. No focal deficits noted.  Psychiatric:   does not appear depressed or anxious.    LABORATORY VALUES:   Recent Labs     10/08/23  0148   WBC 7.3   RBC 3.92*   HEMOGLOBIN 12.3*   HEMATOCRIT 39.4*   .5*   MCH 31.4   MCHC 31.2*   RDW 72.7*   PLATELETCT 405   MPV 10.0     Recent Labs     10/08/23  0148   SODIUM 132*   POTASSIUM 4.4   CHLORIDE 98   CO2 21   GLUCOSE 62*   BUN 15   CREATININE 0.77   CALCIUM 8.6     Recent Labs     10/08/23  0148 10/08/23  0944   ASTSGOT 22  --    ALTSGPT 24  --    TBILIRUBIN 1.2  --    ALKPHOSPHAT 166*  --    GLOBULIN 4.1*  --    INR  --  1.48*     Recent Labs     10/08/23  0944   INR 1.48*        IMAGING:   IR-DRAIN-PERITONEAL    (Results Pending)       ASSESSMENT AND PLAN:     * Small bowel obstruction (HCC)  Assessment & Plan  CT scan with dilated small bowel consistent with an obstruction. No mesenteric edema or hypoenhancement of the bowel wall concerning for ischemia.  Labs without leukocytosis or lactic acidosis.  Abdomen distended but soft and non-tender on exam, colostomy with reported output.  Given that the patient states he is at his baseline can trial clear liquids after his IR procedure and will monitor abdominal exam and colostomy output.  If he develops nausea, vomiting, or worsening pain he will need nasogastric decompression with small bowel follow through after output down.      * Cholecystotomy tube  Assessment & Plan      No plans for interval cholecystectomy at this time.    The patient has clinical and radiographic findings of partial small bowel obstruction without generalized peritonitis,  evidence of clinical deterioration, or radiographic findings consistent with bowel ischemia. Nonoperative management and serial abdominal examination. If he develops nausea, vomiting, or worsening abdominal pain he will need nasogastric decompression with water-soluble contrast imaging within 48 hours if obstructive symptoms fail to resolve.     DISPOSITION: Medical evaluation and admission. The patient was admitted to the Medical Service prior to surgical consultation. [facility] will follow.       ____________________________________     Obdulio Jeff M.D.    DD: 10/8/2023  7:26 AM    AAST Grading System for EGS Conditions  ACS NSQIP Surgical Risk Calculator

## 2023-10-09 ENCOUNTER — APPOINTMENT (OUTPATIENT)
Dept: RADIOLOGY | Facility: MEDICAL CENTER | Age: 63
DRG: 862 | End: 2023-10-09
Attending: GENERAL PRACTICE
Payer: MEDICARE

## 2023-10-09 PROBLEM — Z43.4 CHOLECYSTOSTOMY CARE (HCC): Status: ACTIVE | Noted: 2023-10-09

## 2023-10-09 PROBLEM — E83.42 HYPOMAGNESEMIA: Status: ACTIVE | Noted: 2023-10-09

## 2023-10-09 LAB
ANION GAP SERPL CALC-SCNC: 13 MMOL/L (ref 7–16)
BUN SERPL-MCNC: 12 MG/DL (ref 8–22)
CALCIUM SERPL-MCNC: 7.7 MG/DL (ref 8.5–10.5)
CHLORIDE SERPL-SCNC: 102 MMOL/L (ref 96–112)
CO2 SERPL-SCNC: 18 MMOL/L (ref 20–33)
CREAT SERPL-MCNC: 0.76 MG/DL (ref 0.5–1.4)
ERYTHROCYTE [DISTWIDTH] IN BLOOD BY AUTOMATED COUNT: 70.9 FL (ref 35.9–50)
GFR SERPLBLD CREATININE-BSD FMLA CKD-EPI: 102 ML/MIN/1.73 M 2
GLUCOSE SERPL-MCNC: 84 MG/DL (ref 65–99)
HCT VFR BLD AUTO: 33.1 % (ref 42–52)
HGB BLD-MCNC: 10.2 G/DL (ref 14–18)
MAGNESIUM SERPL-MCNC: 1.4 MG/DL (ref 1.5–2.5)
MCH RBC QN AUTO: 30.5 PG (ref 27–33)
MCHC RBC AUTO-ENTMCNC: 30.8 G/DL (ref 32.3–36.5)
MCV RBC AUTO: 99.1 FL (ref 81.4–97.8)
PHOSPHATE SERPL-MCNC: 2.9 MG/DL (ref 2.5–4.5)
PLATELET # BLD AUTO: 350 K/UL (ref 164–446)
PMV BLD AUTO: 10 FL (ref 9–12.9)
POTASSIUM SERPL-SCNC: 3.9 MMOL/L (ref 3.6–5.5)
RBC # BLD AUTO: 3.34 M/UL (ref 4.7–6.1)
SODIUM SERPL-SCNC: 133 MMOL/L (ref 135–145)
WBC # BLD AUTO: 4.7 K/UL (ref 4.8–10.8)

## 2023-10-09 PROCEDURE — 80048 BASIC METABOLIC PNL TOTAL CA: CPT

## 2023-10-09 PROCEDURE — 99232 SBSQ HOSP IP/OBS MODERATE 35: CPT | Performed by: STUDENT IN AN ORGANIZED HEALTH CARE EDUCATION/TRAINING PROGRAM

## 2023-10-09 PROCEDURE — 770001 HCHG ROOM/CARE - MED/SURG/GYN PRIV*

## 2023-10-09 PROCEDURE — 700101 HCHG RX REV CODE 250: Performed by: STUDENT IN AN ORGANIZED HEALTH CARE EDUCATION/TRAINING PROGRAM

## 2023-10-09 PROCEDURE — 36415 COLL VENOUS BLD VENIPUNCTURE: CPT

## 2023-10-09 PROCEDURE — A9270 NON-COVERED ITEM OR SERVICE: HCPCS | Performed by: GENERAL PRACTICE

## 2023-10-09 PROCEDURE — 302102 BAG OST N IMG 2.25IN 2PC (FECAL): Performed by: GENERAL PRACTICE

## 2023-10-09 PROCEDURE — 85027 COMPLETE CBC AUTOMATED: CPT

## 2023-10-09 PROCEDURE — 302098 PASTE RING (FLAT): Performed by: GENERAL PRACTICE

## 2023-10-09 PROCEDURE — 302111 WAFER OST 2.25IN N IMG RD 2 PC (BARRIER): Performed by: GENERAL PRACTICE

## 2023-10-09 PROCEDURE — 700111 HCHG RX REV CODE 636 W/ 250 OVERRIDE (IP): Performed by: STUDENT IN AN ORGANIZED HEALTH CARE EDUCATION/TRAINING PROGRAM

## 2023-10-09 PROCEDURE — 700102 HCHG RX REV CODE 250 W/ 637 OVERRIDE(OP): Performed by: GENERAL PRACTICE

## 2023-10-09 PROCEDURE — 83735 ASSAY OF MAGNESIUM: CPT

## 2023-10-09 PROCEDURE — 99232 SBSQ HOSP IP/OBS MODERATE 35: CPT | Performed by: GENERAL PRACTICE

## 2023-10-09 PROCEDURE — 700111 HCHG RX REV CODE 636 W/ 250 OVERRIDE (IP): Mod: JZ | Performed by: GENERAL PRACTICE

## 2023-10-09 PROCEDURE — 84100 ASSAY OF PHOSPHORUS: CPT

## 2023-10-09 RX ORDER — METRONIDAZOLE 500 MG/1
500 TABLET ORAL EVERY 12 HOURS
Status: COMPLETED | OUTPATIENT
Start: 2023-10-09 | End: 2023-10-09

## 2023-10-09 RX ORDER — AMOXICILLIN AND CLAVULANATE POTASSIUM 875; 125 MG/1; MG/1
1 TABLET, FILM COATED ORAL EVERY 12 HOURS
Status: DISCONTINUED | OUTPATIENT
Start: 2023-10-10 | End: 2023-10-10

## 2023-10-09 RX ORDER — MAGNESIUM SULFATE HEPTAHYDRATE 40 MG/ML
2 INJECTION, SOLUTION INTRAVENOUS ONCE
Status: COMPLETED | OUTPATIENT
Start: 2023-10-09 | End: 2023-10-09

## 2023-10-09 RX ORDER — MAGNESIUM SULFATE 1 G/100ML
1 INJECTION INTRAVENOUS ONCE
Status: DISCONTINUED | OUTPATIENT
Start: 2023-10-09 | End: 2023-10-09

## 2023-10-09 RX ORDER — METRONIDAZOLE 500 MG/100ML
500 INJECTION, SOLUTION INTRAVENOUS EVERY 12 HOURS
Status: DISCONTINUED | OUTPATIENT
Start: 2023-10-09 | End: 2023-10-09

## 2023-10-09 RX ADMIN — HYDROMORPHONE HYDROCHLORIDE 0.5 MG: 1 INJECTION, SOLUTION INTRAMUSCULAR; INTRAVENOUS; SUBCUTANEOUS at 15:48

## 2023-10-09 RX ADMIN — HYDROMORPHONE HYDROCHLORIDE 0.5 MG: 1 INJECTION, SOLUTION INTRAMUSCULAR; INTRAVENOUS; SUBCUTANEOUS at 00:04

## 2023-10-09 RX ADMIN — CEFTRIAXONE SODIUM 2000 MG: 10 INJECTION, POWDER, FOR SOLUTION INTRAVENOUS at 04:43

## 2023-10-09 RX ADMIN — METRONIDAZOLE 500 MG: 500 TABLET ORAL at 17:26

## 2023-10-09 RX ADMIN — MAGNESIUM SULFATE HEPTAHYDRATE 2 G: 2 INJECTION, SOLUTION INTRAVENOUS at 05:59

## 2023-10-09 RX ADMIN — ENOXAPARIN SODIUM 30 MG: 100 INJECTION SUBCUTANEOUS at 17:26

## 2023-10-09 RX ADMIN — HYDROMORPHONE HYDROCHLORIDE 0.5 MG: 1 INJECTION, SOLUTION INTRAMUSCULAR; INTRAVENOUS; SUBCUTANEOUS at 05:55

## 2023-10-09 RX ADMIN — OXYCODONE HYDROCHLORIDE 10 MG: 10 TABLET ORAL at 09:31

## 2023-10-09 RX ADMIN — METRONIDAZOLE 500 MG: 500 INJECTION, SOLUTION INTRAVENOUS at 04:43

## 2023-10-09 RX ADMIN — OXYCODONE HYDROCHLORIDE 10 MG: 10 TABLET ORAL at 04:39

## 2023-10-09 RX ADMIN — HYDROMORPHONE HYDROCHLORIDE 0.5 MG: 1 INJECTION, SOLUTION INTRAMUSCULAR; INTRAVENOUS; SUBCUTANEOUS at 10:43

## 2023-10-09 RX ADMIN — OXYCODONE HYDROCHLORIDE 10 MG: 10 TABLET ORAL at 14:37

## 2023-10-09 ASSESSMENT — PAIN DESCRIPTION - PAIN TYPE
TYPE: ACUTE PAIN

## 2023-10-09 ASSESSMENT — ENCOUNTER SYMPTOMS: ABDOMINAL PAIN: 1

## 2023-10-09 NOTE — PROGRESS NOTES
This RN called [facility] Transportation Line to Arrange Transportation for patient @[phone]    Left Call back number @ [phone]

## 2023-10-09 NOTE — PROGRESS NOTES
[facility] [employer] Called this RN    [facility] [employer] Stated Transportation will be Performed to take this Patient from [address] to Patient's Home address     Transportation arranged on 10/10 @ 1200    [place] [employer] to Perform Transportation ( [employer])  Number @ [phone]

## 2023-10-09 NOTE — CARE PLAN
Problem: Knowledge Deficit - Standard  Goal: Patient and family/care givers will demonstrate understanding of plan of care, disease process/condition, diagnostic tests and medications  Outcome: Progressing     Problem: Pain - Standard  Goal: Alleviation of pain or a reduction in pain to the patient’s comfort goal  Outcome: Progressing   The patient is Stable - Low risk of patient condition declining or worsening    Shift Goals  Clinical Goals: Pain Control, Update on Plan of Care  Patient Goals: Pain Control  Family Goals: N/A    Progress made toward(s) clinical / shift goals:  Patient educated on plan of care this shift, Pain Controlled per MAR,      Patient is not progressing towards the following goals:

## 2023-10-09 NOTE — PROGRESS NOTES
Assumed care of patient at 0645. Bedside report received. Assessment complete.  AA&Ox4. Denies CP/SOB.  Reporting 7/10 pain. Medicated per MAR.   Educated patient regarding pharmacologic and non pharmacologic modalities for pain management.  Skin per flowsheets  Tolerating Regular diet. Denies N/V.  + void. + Output to Ostomy   Pt ambulates Independently.  All needs met at this time. Call light within reach. Pt calls appropriately. Bed low and locked, non skid socks in place. Hourly rounding in place.

## 2023-10-09 NOTE — PROGRESS NOTES
"  DATE: 10/9/2023    Hospital Day 2     INTERVAL EVENTS:  Pain improving, colostomy with output.  IR consulted for pelvic abscess drain.  Patient states procedure was cancelled, confirming with IR before proceeding with diet trials    REVIEW OF SYSTEMS:  Review of systems is remarkable for the following abdominal pain. The remainder of the comprehensive ROS is negative with the exception of the aforementioned HPI, PMH, and PSH bullets in accordance with CMS guideline.    PHYSICAL EXAMINATION:  Vital Signs: /70   Pulse 90   Temp 36.6 °C (97.9 °F) (Temporal)   Resp 18   Ht 1.702 m (5' 7.01\")   Wt 48.9 kg (107 lb 12.9 oz)   SpO2 95%   Physical Exam  Constitutional:       Appearance: Normal appearance.   HENT:      Head: Normocephalic and atraumatic.      Nose: Nose normal.      Mouth/Throat:      Mouth: Mucous membranes are moist.   Eyes:      Extraocular Movements: Extraocular movements intact.      Pupils: Pupils are equal, round, and reactive to light.   Cardiovascular:      Rate and Rhythm: Normal rate.      Pulses: Normal pulses.   Pulmonary:      Effort: Pulmonary effort is normal.      Breath sounds: Normal breath sounds.   Abdominal:      General: There is no distension.      Tenderness: There is no abdominal tenderness.      Comments: Multiple abdominal scars, Cholecystostomy tube in place with normal appearing bile output.  LLQ stoma, viable with output   Musculoskeletal:         General: No swelling or tenderness.      Cervical back: Normal range of motion.   Skin:     General: Skin is warm.   Neurological:      General: No focal deficit present.      Mental Status: He is alert. Mental status is at baseline.   Psychiatric:         Mood and Affect: Mood normal.         Behavior: Behavior normal.         LABORATORY VALUES:  Recent Labs     10/08/23  0148 10/09/23  0120   WBC 7.3 4.7*   RBC 3.92* 3.34*   HEMOGLOBIN 12.3* 10.2*   HEMATOCRIT 39.4* 33.1*   .5* 99.1*   MCH 31.4 30.5   MCHC 31.2* " 30.8*   RDW 72.7* 70.9*   PLATELETCT 405 350   MPV 10.0 10.0     Recent Labs     10/08/23  0148 10/09/23  0120   SODIUM 132* 133*   POTASSIUM 4.4 3.9   CHLORIDE 98 102   CO2 21 18*   GLUCOSE 62* 84   BUN 15 12   CREATININE 0.77 0.76   CALCIUM 8.6 7.7*     Recent Labs     10/08/23  0148 10/08/23  0944   ASTSGOT 22  --    ALTSGPT 24  --    TBILIRUBIN 1.2  --    ALKPHOSPHAT 166*  --    GLOBULIN 4.1*  --    INR  --  1.48*     Recent Labs     10/08/23  0944   INR 1.48*       IMAGING:  No orders to display       ASSESSMENT AND PLAN:    * Small bowel obstruction (HCC)  Assessment & Plan  CT scan with dilated small bowel consistent with an obstruction. No mesenteric edema or hypoenhancement of the bowel wall concerning for ischemia.  Labs without leukocytosis or lactic acidosis.  Abdomen distended but soft and non-tender on exam, colostomy with reported output.  Given that the patient states he is at his baseline can trial clear liquids after his IR procedure and will monitor abdominal exam and colostomy output.   - Patient states IR case canceled, will confirm with IR team before initiating diet  - Once confirmed no procedure, will trial CLD today    Cholecystostomy care (HCC)  Assessment & Plan  Patient is s/p cholecystostomy.  He has tried numerous times for interval cholecystectomy with various surgeons  Last EF 20-25%, risks outweigh benefits of interval cholecystectomy  Recommend tube study with IR as an outpatient, possible tube removal if patent ducts             ____________________________________     Tu Brito M.D.    DD: 10/9/2023  10:43 AM

## 2023-10-09 NOTE — PROGRESS NOTES
[facility] Medicine Daily Progress Note    Date of Service  10/9/2023    Chief Complaint  Sudheer Tatum is a 63 y.o. male admitted 10/8/2023 with abdominal pain, nausea vomiting    Hospital Course  This is a 63-year-old male with past medical history of hypertension, dyslipidemia, dilated cardiomyopathy, chronic systolic heart failure with LVEF 20%, CAD with multiple stents, extensive surgical history of multiple small bowel obstruction, prior pelvic abscess requiring drain placement, cholecystitis requiring cholecystostomy about 4 months ago, history of partial colon resection with colostomy complicated by colovesicular fistula requiring takedown of colostomy tube and placement of a new colostomy that happened 2019 who was admitted on 10/8/2023 with abdominal pain, nausea and vomiting.    Patient reports because of his extensive cardiac history, his surgeons do not feel comfortable reversing the colostomy.  Patient has since then had 6 small bowel obstructions managed conservatively with NGT decompression.  CT imaging on admit noted SBO with transition point in the right lower abdomen, and noted recurrent abscess in the posterior pelvis.    At this time, patient reports his nausea and vomiting has improved, continues with gas and output from ostomy, he is currently refusing NGT at this time.  SBO resolved conservatively, patient is tolerating advance diet.    Case discussed with IR, given patient is asymptomatic, no white count, no fever, likely sterile postop collection, drain is not indicated at this time.    Interval Problem Update  Case discussed with IR, given patient is asymptomatic, no white count, no fever, likely sterile postop collection, drain is not indicated at this time.    Yesterday patient tolerated clear liquid diet, requesting regular food today, if no nausea vomiting or abdominal pain, patient is clear for discharge.    Unfortunately patient lives in [place], case management to help  facilitate transfer back home.  Likely discharge home tomorrow.    I have discussed this patient's plan of care and discharge plan at IDT rounds today with Case Management, Nursing, Nursing leadership, and other members of the IDT team.    Consultants/Specialty  general surgery and IR    Code Status  Full Code    Disposition  The patient is not medically cleared for discharge to home or a post-acute facility.  Anticipate discharge to: home with close outpatient follow-up    I have placed the appropriate orders for post-discharge needs.    Review of Systems  Review of Systems   Gastrointestinal:  Positive for abdominal pain.   All other systems reviewed and are negative.       Physical Exam  Temp:  [36.6 °C (97.9 °F)-37 °C (98.6 °F)] 36.7 °C (98.1 °F)  Pulse:  [] 101  Resp:  [16-18] 16  BP: ()/(64-73) 108/73  SpO2:  [94 %-98 %] 95 %    Physical Exam  Vitals and nursing note reviewed.   Constitutional:       General: He is not in acute distress.     Appearance: He is ill-appearing.   HENT:      Head: Normocephalic and atraumatic.   Eyes:      Extraocular Movements: Extraocular movements intact.      Conjunctiva/sclera: Conjunctivae normal.      Pupils: Pupils are equal, round, and reactive to light.   Cardiovascular:      Rate and Rhythm: Normal rate and regular rhythm.      Pulses: Normal pulses.      Heart sounds: No murmur heard.     No friction rub. No gallop.   Pulmonary:      Effort: Pulmonary effort is normal. No respiratory distress.      Breath sounds: Normal breath sounds. No wheezing, rhonchi or rales.   Abdominal:      General: Bowel sounds are normal. There is no distension.      Palpations: Abdomen is soft.      Tenderness: There is abdominal tenderness.      Comments: Cholecystomy tube in place with output  Colostomy in place   Musculoskeletal:         General: No swelling or tenderness. Normal range of motion.      Cervical back: Normal range of motion and neck supple. No muscular  tenderness.      Right lower leg: No edema.      Left lower leg: No edema.   Skin:     General: Skin is warm and dry.      Capillary Refill: Capillary refill takes less than 2 seconds.      Findings: No bruising, erythema or rash.   Neurological:      General: No focal deficit present.      Mental Status: He is alert and oriented to person, place, and time.         Fluids    Intake/Output Summary (Last 24 hours) at 10/9/2023 1359  Last data filed at 10/9/2023 1249  Gross per 24 hour   Intake 400 ml   Output 500 ml   Net -100 ml       Laboratory  Recent Labs     10/08/23  0148 10/09/23  0120   WBC 7.3 4.7*   RBC 3.92* 3.34*   HEMOGLOBIN 12.3* 10.2*   HEMATOCRIT 39.4* 33.1*   .5* 99.1*   MCH 31.4 30.5   MCHC 31.2* 30.8*   RDW 72.7* 70.9*   PLATELETCT 405 350   MPV 10.0 10.0     Recent Labs     10/08/23  0148 10/09/23  0120   SODIUM 132* 133*   POTASSIUM 4.4 3.9   CHLORIDE 98 102   CO2 21 18*   GLUCOSE 62* 84   BUN 15 12   CREATININE 0.77 0.76   CALCIUM 8.6 7.7*     Recent Labs     10/08/23  0944   INR 1.48*               Imaging  No orders to display        Assessment/Plan  * Small bowel obstruction (HCC)  Assessment & Plan  Patient with an extensive intra-abdominal surgical history, with a history of 6 prior small bowel obstructions, managed nonsurgically, now presents with small bowel obstruction that was demonstrated on CT imaging.    He has had 4 to 5 days of some nausea and vomiting, along with poor p.o. intake.  He does have output from his colostomy, and states that he there is flatus in the bag.  At this time, patient is refusing NG tube placement  N.p.o., IV fluid, pain control  General surgery -patient improved with conservative management, no NGT was placed.  Advance diet as tolerated    Pelvic abscess in male (HCC)  Assessment & Plan  Patient with history of prior pelvic abscess approximately 4 months ago status post drain placement and removal.    CT imaging shows recurrence  Rocephin Flagyl  Case  discussed with IR, given patient is asymptomatic, no white count, no fever, likely sterile postop collection, drain is not indicated at this time.    Hypomagnesemia  Assessment & Plan  IV supplementation   Serial BMP, magnesium, phosphorus levels ordered for tomorrow morning to continue to monitor electrolytes    Tobacco abuse  Assessment & Plan  Patient smokes several cigarettes per day.  Nicotine patch and/or gum offered.   I discussed cessation with patient including starting on nicotine patch and/or gum on discharge.  I also discussed medications to help with cessation with patient including Wellbutrin and Chantix, offered .  Smoking cessation discussed with patient for 4 minutes.      Severe protein-calorie malnutrition (HCC)  Assessment & Plan  Patient will need nutrition consult once SBO resolves    HFrEF (heart failure with reduced ejection fraction) (HCC)  Assessment & Plan  Per chart review, patient has an ejection fraction of 20 to 25%  Holding home medications due to low normotensive, will resume once clinically more stable         VTE prophylaxis: Lovenox    I have performed a physical exam and reviewed and updated ROS and Plan today (10/9/2023). In review of yesterday's note (10/8/2023), there are no changes except as documented above.

## 2023-10-09 NOTE — CARE PLAN
Problem: Pain - Standard  Goal: Alleviation of pain or a reduction in pain to the patient’s comfort goal  Description: Target End Date:  Prior to discharge or change in level of care    Document on Vitals flowsheet    1.  Document pain using the appropriate pain scale per order or unit policy  2.  Educate and implement non-pharmacologic comfort measures (i.e. relaxation, distraction, massage, cold/heat therapy, etc.)  3.  Pain management medications as ordered  4.  Reassess pain after pain med administration per policy  5.  If opiods administered assess patient's response to pain medication is appropriate per POSS sedation scale  6.  Follow pain management plan developed in collaboration with patient and interdisciplinary team (including palliative care or pain specialists if applicable)  Outcome: Progressing   The patient is Stable - Low risk of patient condition declining or worsening    Shift Goals  Clinical Goals: IR in Am, NPO p MN, pain control, rest  Patient Goals: pain control, rest    Progress made toward(s) clinical / shift goals:  pt will verbalize pain score of 5/10 or below after 30mins to 1 hour of prn med.

## 2023-10-09 NOTE — WOUND TEAM
"  [facility]     Inpatient Services     Established Ostomy Management/ troubleshooting      Plan: Bedside RNs to assist pt with appliance changes. Ostomy RN to remain available PRN for any needs.    HPI: Reviewed  PMH: Reviewed   SH: Reviewed     Reason for Ostomy nurse consult:  Established colostomy    Ostomy History:  patient states he originally had an ostomy on the right side but he had frequent leaking and skin breakdown.  Stoma was reversed but he was later given a colostomy on the left side.      Colostomy LLQ (Active)   Stomal Appliance Assessment Intact    Stoma Assessment Pink;Red    Stoma Shape Budded Greater Than One Inch    Peristomal Assessment Intact;Scar Tissue    Mucocutaneous Junction Intact    Treatment Appliance Checked    Peristomal Protectant No Sting Skin Prep    Stomal Appliance 2 3/4\" (70mm) CTF    Output (mL) 0 mL    Output Color Brown    WOUND RN ONLY - Stomal Appliance  2 Piece;2 3/4\" Moldable    Appliance Brand Lemuel    WOUND NURSE ONLY - Time Spent with Patient (mins) 30      Interventions and Education (if needed): patient has never used a paste ring, educated on use and reason.  Patient just changed appliance and states he has surrounding scar tissue but otherwise skin is intact.  He declines assistance with ostomy change.  Patient given  2 additional sets of supplies (2 3/4 moldable barrier with pouch and brava strips) no additional needs.        Evaluation: patient with ostomy from [facility].  No advanced wound care needs at this time.       Anticipated discharge needs: none   "

## 2023-10-09 NOTE — ASSESSMENT & PLAN NOTE
IV supplementation   Serial BMP, magnesium, phosphorus levels ordered for tomorrow morning to continue to monitor electrolytes

## 2023-10-09 NOTE — ASSESSMENT & PLAN NOTE
Patient is s/p cholecystostomy.  He has tried numerous times for interval cholecystectomy with various surgeons  Last EF 20-25%, risks outweigh benefits of interval cholecystectomy  Recommend tube study with IR as an outpatient, possible tube removal if patent ducts

## 2023-10-09 NOTE — DOCUMENTATION QUERY
[facility]                                                                       Query Response Note      PATIENT:               FE JUNIOR  ACCT #:                  [id number]  MRN:                     [id number]  :                      1960  ADMIT DATE:       10/8/2023 1:10 AM  DISCH DATE:          RESPONDING  PROVIDER #:        [id number]           QUERY TEXT:    Please clarify the relationship between pelvic abscess and previous surgery:    Documentation indicators that raised basis for question:   H&P and Progress Note: Pelvic abscess in male: pt with history of prior pelvic abscess approximately 4 m onths ago s/p drain placement and removal  Risk Factors: extensive intra-abdominal surgical history, cholecystostomy, partial colon resection w/ colostomy complicated by fistula   Treatment: Rocephin, Flagyl, IR consulted for drain placement    Thank you,  Tala Mayo RN, BSN, CCDS  Clinical   Connect via [employer]  Options provided:   -- Pelvic abscess is unexpected and a complication of the procedure(s) performed   -- Pelvic abscess is not a complication due to or associated with the procedure(s)   -- Pelvic abscess is related to another etiology, (please document underlying etiology)   -- Other explanation, please specify   -- Unable to determine      Query created by: Tala Mayo on 10/9/2023 10:16 AM    RESPONSE TEXT:    Pelvic abscess is unexpected and a complication of the procedure(s) performed          Electronically signed by:  LUIS MANUEL GRIMES MD 10/9/2023 10:22 AM

## 2023-10-10 ENCOUNTER — PHARMACY VISIT (OUTPATIENT)
Dept: PHARMACY | Facility: MEDICAL CENTER | Age: 63
End: 2023-10-10
Payer: COMMERCIAL

## 2023-10-10 ENCOUNTER — APPOINTMENT (OUTPATIENT)
Dept: RADIOLOGY | Facility: MEDICAL CENTER | Age: 63
DRG: 862 | End: 2023-10-10
Attending: PHYSICIAN ASSISTANT
Payer: MEDICARE

## 2023-10-10 VITALS
OXYGEN SATURATION: 96 % | SYSTOLIC BLOOD PRESSURE: 105 MMHG | RESPIRATION RATE: 16 BRPM | WEIGHT: 107.81 LBS | TEMPERATURE: 98.1 F | BODY MASS INDEX: 16.92 KG/M2 | HEART RATE: 85 BPM | DIASTOLIC BLOOD PRESSURE: 70 MMHG | HEIGHT: 67 IN

## 2023-10-10 LAB
ANION GAP SERPL CALC-SCNC: 12 MMOL/L (ref 7–16)
BUN SERPL-MCNC: 11 MG/DL (ref 8–22)
CALCIUM SERPL-MCNC: 8.3 MG/DL (ref 8.5–10.5)
CHLORIDE SERPL-SCNC: 100 MMOL/L (ref 96–112)
CO2 SERPL-SCNC: 22 MMOL/L (ref 20–33)
CREAT SERPL-MCNC: 0.84 MG/DL (ref 0.5–1.4)
ERYTHROCYTE [DISTWIDTH] IN BLOOD BY AUTOMATED COUNT: 69.9 FL (ref 35.9–50)
GFR SERPLBLD CREATININE-BSD FMLA CKD-EPI: 99 ML/MIN/1.73 M 2
GLUCOSE SERPL-MCNC: 109 MG/DL (ref 65–99)
HCT VFR BLD AUTO: 37.8 % (ref 42–52)
HGB BLD-MCNC: 12 G/DL (ref 14–18)
MAGNESIUM SERPL-MCNC: 1.9 MG/DL (ref 1.5–2.5)
MCH RBC QN AUTO: 30.8 PG (ref 27–33)
MCHC RBC AUTO-ENTMCNC: 31.7 G/DL (ref 32.3–36.5)
MCV RBC AUTO: 96.9 FL (ref 81.4–97.8)
PHOSPHATE SERPL-MCNC: 2.8 MG/DL (ref 2.5–4.5)
PLATELET # BLD AUTO: 420 K/UL (ref 164–446)
PMV BLD AUTO: 10.2 FL (ref 9–12.9)
POTASSIUM SERPL-SCNC: 3.7 MMOL/L (ref 3.6–5.5)
RBC # BLD AUTO: 3.9 M/UL (ref 4.7–6.1)
SODIUM SERPL-SCNC: 134 MMOL/L (ref 135–145)
WBC # BLD AUTO: 6.8 K/UL (ref 4.8–10.8)

## 2023-10-10 PROCEDURE — 700111 HCHG RX REV CODE 636 W/ 250 OVERRIDE (IP): Mod: JZ | Performed by: STUDENT IN AN ORGANIZED HEALTH CARE EDUCATION/TRAINING PROGRAM

## 2023-10-10 PROCEDURE — 99239 HOSP IP/OBS DSCHRG MGMT >30: CPT | Performed by: STUDENT IN AN ORGANIZED HEALTH CARE EDUCATION/TRAINING PROGRAM

## 2023-10-10 PROCEDURE — 83735 ASSAY OF MAGNESIUM: CPT

## 2023-10-10 PROCEDURE — 84100 ASSAY OF PHOSPHORUS: CPT

## 2023-10-10 PROCEDURE — 700102 HCHG RX REV CODE 250 W/ 637 OVERRIDE(OP): Performed by: STUDENT IN AN ORGANIZED HEALTH CARE EDUCATION/TRAINING PROGRAM

## 2023-10-10 PROCEDURE — 700111 HCHG RX REV CODE 636 W/ 250 OVERRIDE (IP): Performed by: STUDENT IN AN ORGANIZED HEALTH CARE EDUCATION/TRAINING PROGRAM

## 2023-10-10 PROCEDURE — 99232 SBSQ HOSP IP/OBS MODERATE 35: CPT | Performed by: PHYSICIAN ASSISTANT

## 2023-10-10 PROCEDURE — 36415 COLL VENOUS BLD VENIPUNCTURE: CPT

## 2023-10-10 PROCEDURE — A9270 NON-COVERED ITEM OR SERVICE: HCPCS | Performed by: GENERAL PRACTICE

## 2023-10-10 PROCEDURE — 700102 HCHG RX REV CODE 250 W/ 637 OVERRIDE(OP): Performed by: GENERAL PRACTICE

## 2023-10-10 PROCEDURE — 80048 BASIC METABOLIC PNL TOTAL CA: CPT

## 2023-10-10 PROCEDURE — RXMED WILLOW AMBULATORY MEDICATION CHARGE: Performed by: STUDENT IN AN ORGANIZED HEALTH CARE EDUCATION/TRAINING PROGRAM

## 2023-10-10 PROCEDURE — A9270 NON-COVERED ITEM OR SERVICE: HCPCS | Performed by: STUDENT IN AN ORGANIZED HEALTH CARE EDUCATION/TRAINING PROGRAM

## 2023-10-10 PROCEDURE — 74018 RADEX ABDOMEN 1 VIEW: CPT

## 2023-10-10 PROCEDURE — 85027 COMPLETE CBC AUTOMATED: CPT

## 2023-10-10 RX ORDER — HYDROMORPHONE HYDROCHLORIDE 1 MG/ML
0.5 INJECTION, SOLUTION INTRAMUSCULAR; INTRAVENOUS; SUBCUTANEOUS ONCE
Status: COMPLETED | OUTPATIENT
Start: 2023-10-10 | End: 2023-10-10

## 2023-10-10 RX ORDER — HYDROMORPHONE HYDROCHLORIDE 2 MG/1
1 TABLET ORAL EVERY 6 HOURS PRN
Qty: 10 TABLET | Refills: 0 | Status: SHIPPED | OUTPATIENT
Start: 2023-10-10 | End: 2023-10-20

## 2023-10-10 RX ADMIN — OXYCODONE HYDROCHLORIDE 10 MG: 10 TABLET ORAL at 09:50

## 2023-10-10 RX ADMIN — HYDROMORPHONE HYDROCHLORIDE 0.5 MG: 1 INJECTION, SOLUTION INTRAMUSCULAR; INTRAVENOUS; SUBCUTANEOUS at 11:35

## 2023-10-10 RX ADMIN — PROMETHAZINE HYDROCHLORIDE 25 MG: 25 TABLET ORAL at 09:50

## 2023-10-10 RX ADMIN — ONDANSETRON 4 MG: 2 INJECTION INTRAMUSCULAR; INTRAVENOUS at 00:55

## 2023-10-10 ASSESSMENT — PAIN DESCRIPTION - PAIN TYPE: TYPE: CHRONIC PAIN

## 2023-10-10 NOTE — CARE PLAN
Problem: Knowledge Deficit - Standard  Goal: Patient and family/care givers will demonstrate understanding of plan of care, disease process/condition, diagnostic tests and medications  Outcome: Progressing     Problem: Pain - Standard  Goal: Alleviation of pain or a reduction in pain to the patient’s comfort goal  Outcome: Progressing   The patient is Stable - Low risk of patient condition declining or worsening    Shift Goals  Clinical Goals: Discharge  Patient Goals: Nausea, Pain Control  Family Goals: N/A    Progress made toward(s) clinical / shift goals:  Patient Discharged to Home via Insurance Provided Transportation, Pain medicated per MAR, Educated patient on plan of care this shift, Patient verbalized understanding     Patient is not progressing towards the following goals:

## 2023-10-10 NOTE — CARE PLAN
The patient is Stable - Low risk of patient condition declining or worsening    Shift Goals  Clinical Goals: pain mgmt  Patient Goals: pain mgmt  Family Goals: N/A    Progress made toward(s) clinical / shift goals:    Problem: Knowledge Deficit - Standard  Goal: Patient and family/care givers will demonstrate understanding of plan of care, disease process/condition, diagnostic tests and medications  Description: Target End Date:  1-3 days or as soon as patient condition allows    Document in Patient Education    1.  Patient and family/caregiver oriented to unit, equipment, visitation policy and means for communicating concern  2.  Complete/review Learning Assessment  3.  Assess knowledge level of disease process/condition, treatment plan, diagnostic tests and medications  4.  Explain disease process/condition, treatment plan, diagnostic tests and medications  Outcome: Progressing

## 2023-10-10 NOTE — PROGRESS NOTES
Report received from day shift RN, assumed Care.   Patient is AOx4, responds appropriately.      Pain controlled at this time.  Patient is tolerating regular diet, denies nausea/vomiting. Ostomy in place to LLQ, +output.  Up self with steady gait.    Plan of care discussed, all questions answered.    Educated on use of call light and importance of calling when assistance is required. Pt verbalizes understanding.    Call light and belongings within reach, treaded slipper socks on, bed in lowest locked position.  All needs met at this time.

## 2023-10-10 NOTE — DISCHARGE INSTRUCTIONS
Discharge Instructions    Discharged to home by medical transportation with escort. Discharged via wheelchair, hospital escort: Yes.  Special equipment needed: Not Applicable    Be sure to schedule a follow-up appointment with your primary care doctor or any specialists as instructed.     Discharge Plan:   Influenza Vaccine Indication: Not indicated: Previously immunized this influenza season and > 8 years of age    I understand that a diet low in cholesterol, fat, and sodium is recommended for good health. Unless I have been given specific instructions below for another diet, I accept this instruction as my diet prescription.   Other diet:     Special Instructions: None    -Is this patient being discharged with medication to prevent blood clots?  No    Is patient discharged on Warfarin / Coumadin?   No

## 2023-10-10 NOTE — PROGRESS NOTES
Assumed care of patient at 0645. Bedside report received. Assessment complete.  AA&Ox4. Denies CP/SOB.  Reporting 7/10 pain. Medicated per MAR.   Educated patient regarding pharmacologic and non pharmacologic modalities for pain management.  Skin per flowsheets  Tolerating Regular diet. Denies N/V.  + void. + Output to Ostomy   Pt ambulates Independently w Single Point Cane.  All needs met at this time. Call light within reach. Pt calls appropriately. Bed low and locked, non skid socks in place. Hourly rounding in place.

## 2023-10-10 NOTE — DISCHARGE SUMMARY
Discharge Summary    CHIEF COMPLAINT ON ADMISSION  No chief complaint on file.      Reason for Admission  SBO     Admission Date  10/8/2023    CODE STATUS  Prior    HPI & HOSPITAL COURSE  This is a 63-year-old male with past medical history of hypertension, dyslipidemia, dilated cardiomyopathy, chronic systolic heart failure with LVEF 20%, CAD with multiple stents, extensive surgical history of multiple small bowel obstruction, prior pelvic abscess requiring drain placement, cholecystitis requiring cholecystostomy about 4 months ago, history of partial colon resection with colostomy complicated by colovesicular fistula requiring takedown of colostomy tube and placement of a new colostomy that happened 2019 who was admitted on 10/8/2023 with abdominal pain, nausea and vomiting.    Patient reports because of his extensive cardiac history, his surgeons do not feel comfortable reversing the colostomy.  Patient has since then had 6 small bowel obstructions managed conservatively with NGT decompression.  CT imaging on admit noted SBO with transition point in the right lower abdomen, and noted possible recurrent abscess in the posterior pelvis.   Case discussed with IR, given patient is asymptomatic, no white count, no fever, likely sterile postop collection, drain is not indicated at this time.    At this time, patient reports his nausea and vomiting has improved, continues with gas and output from ostomy, he is currently refusing NGT at this time.  SBO resolved conservatively, patient is tolerating advance diet. He is discharged on short course of oral dilaudid for pain control. He is to follow up closely with PCP for continued pain management.      Therefore, he is discharged in fair and stable condition to home with close outpatient follow-up.    The patient met 2-midnight criteria for an inpatient stay at the time of discharge.    Discharge Date  10/10/2023    FOLLOW UP ITEMS POST DISCHARGE  Take medications as  prescribed.  Follow up with PCP.    DISCHARGE DIAGNOSES  Principal Problem:    Small bowel obstruction (HCC) (POA: Unknown)  Active Problems:    Pelvic abscess in male (HCC) (POA: Unknown)    HFrEF (heart failure with reduced ejection fraction) (HCC) (POA: Unknown)    Severe protein-calorie malnutrition (HCC) (POA: Unknown)    Tobacco abuse (POA: Unknown)    Hypomagnesemia (POA: Unknown)    Cholecystostomy care (HCC) (POA: Unknown)  Resolved Problems:    * No resolved hospital problems. *      FOLLOW UP  No future appointments.  No follow-up provider specified.    MEDICATIONS ON DISCHARGE     Medication List        START taking these medications        Instructions   HYDROmorphone 2 MG Tabs  Commonly known as: Dilaudid   Take 0.5 Tablets by mouth every 6 hours as needed for Severe Pain for up to 10 days.  Dose: 1 mg            CONTINUE taking these medications        Instructions   aspirin 81 MG EC tablet   Take 81 mg by mouth every day.  Dose: 81 mg     cyanocobalamin 500 MCG Tabs  Commonly known as: Vitamin B-12   Take 500 mcg by mouth every day.  Dose: 500 mcg     ferrous sulfate 325 (65 Fe) MG tablet   Take 325 mg by mouth every day.  Dose: 325 mg     folic acid 1 MG Tabs  Commonly known as: Folvite   Take 1 mg by mouth every day.  Dose: 1 mg     furosemide 40 MG Tabs  Commonly known as: Lasix   Take 40 mg by mouth every day.  Dose: 40 mg     gabapentin 100 MG Caps  Commonly known as: Neurontin   Take 100 mg by mouth 3 times a day.  Dose: 100 mg     magnesium oxide 400 MG Tabs tablet  Commonly known as: Mag-Ox   Take 400 mg by mouth every day.  Dose: 400 mg     mirtazapine 30 MG Tabs tablet  Commonly known as: Remeron   Take 30 mg by mouth every evening. PRN  Dose: 30 mg     nitroglycerin 0.4 MG Subl  Commonly known as: Nitrostat   Place 0.4 mg under the tongue as needed for Chest Pain. Has not taken it yet  Dose: 0.4 mg     ondansetron 4 MG Tbdp  Commonly known as: Zofran ODT   Take 4 mg by mouth every 6 hours  as needed for Nausea/Vomiting.  Dose: 4 mg     salmeterol 50 MCG/ACT Aepb  Commonly known as: Serevent   Inhale 1 Puff 2 times a day.  Dose: 1 Puff     sertraline 50 MG Tabs  Commonly known as: Zoloft   Take 50 mg by mouth every day.  Dose: 50 mg              Allergies  No Known Allergies    DIET  No orders of the defined types were placed in this encounter.      ACTIVITY  As tolerated.  Weight bearing as tolerated    CONSULTATIONS  surgery    PROCEDURES  none    LABORATORY  Lab Results   Component Value Date    SODIUM 134 (L) 10/10/2023    POTASSIUM 3.7 10/10/2023    CHLORIDE 100 10/10/2023    CO2 22 10/10/2023    GLUCOSE 109 (H) 10/10/2023    BUN 11 10/10/2023    CREATININE 0.84 10/10/2023        Lab Results   Component Value Date    WBC 6.8 10/10/2023    HEMOGLOBIN 12.0 (L) 10/10/2023    HEMATOCRIT 37.8 (L) 10/10/2023    PLATELETCT 420 10/10/2023        Total time of the discharge process exceeds 38 minutes.

## 2023-10-10 NOTE — PROGRESS NOTES
[facility] Called @0900  Spoke w Jose M Salguero Verbalized/ [employer] Transportation Time @ 1200   This RN Left Call Back Number to Call when Transportation has Arrived  Curb Pickup will be Performed

## 2023-10-10 NOTE — PROGRESS NOTES
"    DATE: 10/10/2023    Hospital Day 2 surgical consultation small bowel obstruction.    INTERVAL EVENTS:  Tolerating diet with some increased abdominal distension. Passing stool through ostomy.  No indication for IR drainage of pelvic abscess.   No leukocytosis.     PHYSICAL EXAMINATION:  Vital Signs: /70   Pulse 85   Temp 36.7 °C (98.1 °F) (Temporal)   Resp 16   Ht 1.702 m (5' 7.01\")   Wt 48.9 kg (107 lb 12.9 oz)   SpO2 96%   Physical Exam  Vitals and nursing note reviewed.   Constitutional:       General: He is not in acute distress.     Appearance: He is not ill-appearing.   HENT:      Mouth/Throat:      Mouth: Mucous membranes are moist.   Cardiovascular:      Rate and Rhythm: Normal rate.   Pulmonary:      Effort: Pulmonary effort is normal. No respiratory distress.   Abdominal:      General: Bowel sounds are increased.      Comments: Distended abdominal with minimal pain.   Ostomy viable and producing.   Neeru tube in place.    Skin:     General: Skin is warm and dry.   Neurological:      Mental Status: He is alert and oriented to person, place, and time.   Psychiatric:         Behavior: Behavior normal.         LABORATORY VALUES:   Recent Labs     10/08/23  0148 10/09/23  0120 10/10/23  0146   WBC 7.3 4.7* 6.8   RBC 3.92* 3.34* 3.90*   HEMOGLOBIN 12.3* 10.2* 12.0*   HEMATOCRIT 39.4* 33.1* 37.8*   .5* 99.1* 96.9   MCH 31.4 30.5 30.8   MCHC 31.2* 30.8* 31.7*   RDW 72.7* 70.9* 69.9*   PLATELETCT 405 350 420   MPV 10.0 10.0 10.2     Recent Labs     10/08/23  0148 10/09/23  0120 10/10/23  0146   SODIUM 132* 133* 134*   POTASSIUM 4.4 3.9 3.7   CHLORIDE 98 102 100   CO2 21 18* 22   GLUCOSE 62* 84 109*   BUN 15 12 11   CREATININE 0.77 0.76 0.84   CALCIUM 8.6 7.7* 8.3*     Recent Labs     10/08/23  0148 10/08/23  0944   ASTSGOT 22  --    ALTSGPT 24  --    TBILIRUBIN 1.2  --    ALKPHOSPHAT 166*  --    GLOBULIN 4.1*  --    INR  --  1.48*     Recent Labs     10/08/23  0944   INR 1.48*        IMAGING: "   [id number] VIEW   Final Result      1.  Persistent marked dilatation of multiple small bowel loops, likely small bowel obstruction.   2.  Small amount of stool in the colon.               ASSESSMENT AND PLAN:   * Small bowel obstruction (HCC)  Assessment & Plan  CT scan with dilated small bowel consistent with an obstruction. No mesenteric edema or hypoenhancement of the bowel wall concerning for ischemia.  Labs without leukocytosis or lactic acidosis.  Abdomen distended but soft and non-tender on exam, colostomy with reported output.  Given that the patient states he is at his baseline can trial clear liquids after his IR procedure and will monitor abdominal exam and colostomy output.   - Patient states IR case canceled, no indication for draining.   10/10 Tolerating diet.     Cholecystostomy care (HCC)  Assessment & Plan  Patient is s/p cholecystostomy.  He has tried numerous times for interval cholecystectomy with various surgeons  Last EF 20-25%, risks outweigh benefits of interval cholecystectomy  Recommend tube study with IR as an outpatient, possible tube removal if patent ducts      Discussed patient condition with RN, Patient, and general surgery. Dr. Tu Brito.

## 2023-10-10 NOTE — PROGRESS NOTES
"Patient refusing any oral medications this morning stating, \"I can't go home today. I'm too sick, and I cant take anything by mouth, only oral.\" This RN educated patient on importance of oral antibiotic. Pt still refusing. Patient also refusing any anti nausea medication.   "

## 2023-10-10 NOTE — PROGRESS NOTES
Discharging Patient home per physician order.  Discharged with Medical Transportation.  Demonstrated understanding of discharge instructions, follow up appointments, home medications, prescriptions, home care for surgical woun.  Ambulating without assistance, voiding without difficulty, pain well controlled, tolerating oral medications, oxygen saturation greater than 90% , tolerating diet. Educational handouts given and discussed.  Verbalized understanding of discharge instructions and educational handouts.  Stated several reasons why to return to ED or seek medical attention. All questions answered.  Belongings and dressing supplies with patient at time of discharge.